# Patient Record
Sex: FEMALE | Race: WHITE | Employment: FULL TIME | URBAN - NONMETROPOLITAN AREA
[De-identification: names, ages, dates, MRNs, and addresses within clinical notes are randomized per-mention and may not be internally consistent; named-entity substitution may affect disease eponyms.]

---

## 2017-01-20 DIAGNOSIS — G47.00 INSOMNIA, UNSPECIFIED TYPE: ICD-10-CM

## 2017-01-24 RX ORDER — CHOLECALCIFEROL (VITAMIN D3) 125 MCG
CAPSULE ORAL
Qty: 30 TABLET | Refills: 0 | Status: SHIPPED | OUTPATIENT
Start: 2017-01-24 | End: 2017-03-01 | Stop reason: SDUPTHER

## 2017-01-26 RX ORDER — OMEPRAZOLE 20 MG/1
CAPSULE, DELAYED RELEASE ORAL
Qty: 60 CAPSULE | Refills: 0 | Status: SHIPPED | OUTPATIENT
Start: 2017-01-26 | End: 2017-02-22 | Stop reason: CLARIF

## 2017-01-26 RX ORDER — ASPIRIN 325 MG/1
TABLET, COATED ORAL
Qty: 30 TABLET | Refills: 0 | Status: SHIPPED | OUTPATIENT
Start: 2017-01-26

## 2017-02-14 DIAGNOSIS — R53.83 OTHER FATIGUE: ICD-10-CM

## 2017-02-17 RX ORDER — UBIDECARENONE 75 MG
CAPSULE ORAL
Qty: 14 TABLET | Refills: 0 | Status: SHIPPED | OUTPATIENT
Start: 2017-02-17 | End: 2017-02-22 | Stop reason: SDUPTHER

## 2017-02-17 RX ORDER — ATENOLOL 25 MG/1
TABLET ORAL
Qty: 14 TABLET | Refills: 0 | Status: SHIPPED | OUTPATIENT
Start: 2017-02-17 | End: 2017-02-22 | Stop reason: SDUPTHER

## 2017-03-01 DIAGNOSIS — G47.00 INSOMNIA, UNSPECIFIED TYPE: ICD-10-CM

## 2017-03-02 RX ORDER — CHOLECALCIFEROL (VITAMIN D3) 125 MCG
CAPSULE ORAL
Qty: 30 TABLET | Refills: 0 | Status: SHIPPED | OUTPATIENT
Start: 2017-03-02

## 2017-03-07 DIAGNOSIS — F41.9 ANXIETY: ICD-10-CM

## 2017-03-07 RX ORDER — HYDROXYZINE PAMOATE 25 MG/1
50 CAPSULE ORAL 4 TIMES DAILY PRN
Qty: 120 CAPSULE | Refills: 0 | Status: SHIPPED | OUTPATIENT
Start: 2017-03-07 | End: 2017-03-22 | Stop reason: SDUPTHER

## 2017-03-16 RX ORDER — ALBUTEROL SULFATE 90 UG/1
2 AEROSOL, METERED RESPIRATORY (INHALATION) EVERY 6 HOURS PRN
Qty: 1 INHALER | Refills: 0 | Status: SHIPPED | OUTPATIENT
Start: 2017-03-16

## 2017-03-22 DIAGNOSIS — F41.9 ANXIETY: ICD-10-CM

## 2017-03-22 RX ORDER — HYDROXYZINE PAMOATE 50 MG/1
50 CAPSULE ORAL 3 TIMES DAILY PRN
Qty: 90 CAPSULE | Refills: 1 | Status: SHIPPED | OUTPATIENT
Start: 2017-03-22

## 2017-05-22 DIAGNOSIS — F41.9 ANXIETY: ICD-10-CM

## 2017-05-23 RX ORDER — HYDROXYZINE PAMOATE 50 MG/1
50 CAPSULE ORAL 3 TIMES DAILY PRN
Qty: 90 CAPSULE | Refills: 1 | OUTPATIENT
Start: 2017-05-23

## 2018-01-18 ENCOUNTER — HOSPITAL ENCOUNTER (OUTPATIENT)
Dept: GENERAL RADIOLOGY | Age: 58
Discharge: HOME | End: 2018-01-18

## 2018-01-18 DIAGNOSIS — R01.2 ABNORMAL HEART SOUNDS: ICD-10-CM

## 2018-03-02 ENCOUNTER — HOSPITAL ENCOUNTER (OUTPATIENT)
Dept: CT IMAGING | Age: 58
Discharge: HOME | End: 2018-03-02

## 2018-03-02 DIAGNOSIS — R91.1 LUNG NODULE: ICD-10-CM

## 2018-03-20 ENCOUNTER — HOSPITAL ENCOUNTER (OUTPATIENT)
Dept: CARDIAC REHAB | Age: 58
Discharge: HOME | End: 2018-03-20

## 2018-03-20 ENCOUNTER — HOSPITAL ENCOUNTER (OUTPATIENT)
Dept: CARDIAC REHAB | Age: 58
Discharge: HOME | End: 2018-03-20
Attending: INTERNAL MEDICINE | Admitting: INTERNAL MEDICINE

## 2018-03-20 LAB
LV EF: 54 %
LVEF MODALITY: NORMAL

## 2018-10-30 ENCOUNTER — HOSPITAL ENCOUNTER (OUTPATIENT)
Age: 58
Discharge: HOME | End: 2018-10-30
Payer: COMMERCIAL

## 2018-10-30 LAB
ALBUMIN SERPL-MCNC: 4.8 GM/DL (ref 3.4–5)
ALP BLD-CCNC: 106 IU/L (ref 40–129)
ALT SERPL-CCNC: 10 U/L (ref 10–40)
ANION GAP SERPL CALCULATED.3IONS-SCNC: 12 MMOL/L (ref 4–16)
AST SERPL-CCNC: 17 IU/L (ref 15–37)
BILIRUB SERPL-MCNC: 0.4 MG/DL (ref 0–1)
BUN BLDV-MCNC: 16 MG/DL (ref 6–23)
CALCIUM SERPL-MCNC: 10.2 MG/DL (ref 8.3–10.6)
CHLORIDE BLD-SCNC: 100 MMOL/L (ref 99–110)
CO2: 29 MMOL/L (ref 21–32)
CREAT SERPL-MCNC: 1 MG/DL (ref 0.6–1.1)
GFR AFRICAN AMERICAN: >60 ML/MIN/1.73M2
GFR NON-AFRICAN AMERICAN: 57 ML/MIN/1.73M2
GLUCOSE FASTING: 103 MG/DL (ref 70–99)
HCT VFR BLD CALC: 48.2 % (ref 37–47)
HEMOGLOBIN: 15.9 GM/DL (ref 12.5–16)
MCH RBC QN AUTO: 31.8 PG (ref 27–31)
MCHC RBC AUTO-ENTMCNC: 33 % (ref 32–36)
MCV RBC AUTO: 96.4 FL (ref 78–100)
PDW BLD-RTO: 12.5 % (ref 11.7–14.9)
PLATELET # BLD: 316 K/CU MM (ref 140–440)
PMV BLD AUTO: 10 FL (ref 7.5–11.1)
POTASSIUM SERPL-SCNC: 4.3 MMOL/L (ref 3.5–5.1)
RBC # BLD: 5 M/CU MM (ref 4.2–5.4)
SODIUM BLD-SCNC: 141 MMOL/L (ref 135–145)
TOTAL PROTEIN: 7.6 GM/DL (ref 6.4–8.2)
WBC # BLD: 9 K/CU MM (ref 4–10.5)

## 2018-10-30 PROCEDURE — 80061 LIPID PANEL: CPT

## 2018-10-30 PROCEDURE — 85027 COMPLETE CBC AUTOMATED: CPT

## 2018-10-30 PROCEDURE — 80053 COMPREHEN METABOLIC PANEL: CPT

## 2018-10-30 PROCEDURE — 84443 ASSAY THYROID STIM HORMONE: CPT

## 2018-10-30 PROCEDURE — 36415 COLL VENOUS BLD VENIPUNCTURE: CPT

## 2018-10-31 LAB
CHOLESTEROL, FASTING: 263 MG/DL
HDLC SERPL-MCNC: 76 MG/DL
LDL CHOLESTEROL DIRECT: 182 MG/DL
TRIGLYCERIDE, FASTING: 145 MG/DL
TSH HIGH SENSITIVITY: 2.58 UIU/ML (ref 0.27–4.2)

## 2020-04-17 ENCOUNTER — HOSPITAL ENCOUNTER (OUTPATIENT)
Age: 60
Discharge: HOME | End: 2020-04-17
Payer: MEDICARE

## 2020-04-17 ENCOUNTER — HOSPITAL ENCOUNTER (OUTPATIENT)
Dept: GENERAL RADIOLOGY | Age: 60
Discharge: HOME | End: 2020-04-17
Payer: MEDICARE

## 2020-04-17 LAB
ALBUMIN SERPL-MCNC: 4.3 GM/DL (ref 3.4–5)
ALP BLD-CCNC: 109 IU/L (ref 40–129)
ALT SERPL-CCNC: 13 U/L (ref 10–40)
ANION GAP SERPL CALCULATED.3IONS-SCNC: 7 MMOL/L (ref 4–16)
AST SERPL-CCNC: 20 IU/L (ref 15–37)
BILIRUB SERPL-MCNC: 0.3 MG/DL (ref 0–1)
BUN BLDV-MCNC: 8 MG/DL (ref 6–23)
CALCIUM SERPL-MCNC: 9.5 MG/DL (ref 8.3–10.6)
CHLORIDE BLD-SCNC: 102 MMOL/L (ref 99–110)
CO2: 34 MMOL/L (ref 21–32)
CREAT SERPL-MCNC: 0.9 MG/DL (ref 0.6–1.1)
GFR AFRICAN AMERICAN: >60 ML/MIN/1.73M2
GFR NON-AFRICAN AMERICAN: >60 ML/MIN/1.73M2
GLUCOSE FASTING: 96 MG/DL (ref 70–99)
HCT VFR BLD CALC: 45.4 % (ref 37–47)
HEMOGLOBIN: 14.6 GM/DL (ref 12.5–16)
MCH RBC QN AUTO: 31.3 PG (ref 27–31)
MCHC RBC AUTO-ENTMCNC: 32.2 % (ref 32–36)
MCV RBC AUTO: 97.2 FL (ref 78–100)
PDW BLD-RTO: 12.1 % (ref 11.7–14.9)
PLATELET # BLD: 235 K/CU MM (ref 140–440)
PMV BLD AUTO: 11.6 FL (ref 7.5–11.1)
POTASSIUM SERPL-SCNC: 4.5 MMOL/L (ref 3.5–5.1)
RBC # BLD: 4.67 M/CU MM (ref 4.2–5.4)
SODIUM BLD-SCNC: 143 MMOL/L (ref 135–145)
TOTAL PROTEIN: 7 GM/DL (ref 6.4–8.2)
TROPONIN T: <0.01 NG/ML
TSH HIGH SENSITIVITY: 2.59 UIU/ML (ref 0.27–4.2)
WBC # BLD: 7.4 K/CU MM (ref 4–10.5)

## 2020-04-17 PROCEDURE — 80053 COMPREHEN METABOLIC PANEL: CPT

## 2020-04-17 PROCEDURE — 85027 COMPLETE CBC AUTOMATED: CPT

## 2020-04-17 PROCEDURE — 36415 COLL VENOUS BLD VENIPUNCTURE: CPT

## 2020-04-17 PROCEDURE — 84484 ASSAY OF TROPONIN QUANT: CPT

## 2020-04-17 PROCEDURE — 84443 ASSAY THYROID STIM HORMONE: CPT

## 2020-04-17 PROCEDURE — 71046 X-RAY EXAM CHEST 2 VIEWS: CPT

## 2023-03-21 LAB
ALBUMIN SERPL-MCNC: 5.4 G/DL
ALP BLD-CCNC: 130 U/L
ALT SERPL-CCNC: 34 U/L
ANION GAP SERPL CALCULATED.3IONS-SCNC: NORMAL MMOL/L
AST SERPL-CCNC: 22 U/L
BASOPHILS ABSOLUTE: 0.06 /ΜL
BASOPHILS RELATIVE PERCENT: 0.8 %
BILIRUB SERPL-MCNC: 0.7 MG/DL (ref 0.1–1.4)
BUN BLDV-MCNC: 12 MG/DL
CALCIUM SERPL-MCNC: 10.1 MG/DL
CHLORIDE BLD-SCNC: 102 MMOL/L
CHOLESTEROL, TOTAL: 165 MG/DL
CHOLESTEROL/HDL RATIO: ABNORMAL
CO2: 28 MMOL/L
CREAT SERPL-MCNC: NORMAL MG/DL
EGFR: NORMAL
EOSINOPHILS ABSOLUTE: 0.19 /ΜL
EOSINOPHILS RELATIVE PERCENT: 2.5 %
GLUCOSE BLD-MCNC: 82 MG/DL
HCT VFR BLD CALC: 49.7 % (ref 36–46)
HDLC SERPL-MCNC: 89 MG/DL (ref 35–70)
HEMOGLOBIN: 17.6 G/DL (ref 12–16)
LDL CHOLESTEROL CALCULATED: 58 MG/DL (ref 0–160)
LYMPHOCYTES ABSOLUTE: 1.49 /ΜL
LYMPHOCYTES RELATIVE PERCENT: 19.6 %
MCH RBC QN AUTO: 32.7 PG
MCHC RBC AUTO-ENTMCNC: 34.2 G/DL
MCV RBC AUTO: 95.6 FL
MONOCYTES ABSOLUTE: 0.37 /ΜL
MONOCYTES RELATIVE PERCENT: 4.9 %
NEUTROPHILS ABSOLUTE: 5.47 /ΜL
NEUTROPHILS RELATIVE PERCENT: 71.9 %
NONHDLC SERPL-MCNC: ABNORMAL MG/DL
PLATELET # BLD: 247 K/ΜL
PMV BLD AUTO: 11.4 FL
POTASSIUM SERPL-SCNC: 4.2 MMOL/L
RBC # BLD: 5.2 10^6/ΜL
SODIUM BLD-SCNC: 142 MMOL/L
TOTAL PROTEIN: 7.5
TRIGL SERPL-MCNC: 89 MG/DL
VLDLC SERPL CALC-MCNC: 18 MG/DL
WBC # BLD: 7.6 10^3/ML

## 2023-04-26 ENCOUNTER — INITIAL CONSULT (OUTPATIENT)
Dept: CARDIOLOGY CLINIC | Age: 63
End: 2023-04-26

## 2023-04-26 ENCOUNTER — NURSE ONLY (OUTPATIENT)
Dept: CARDIOLOGY CLINIC | Age: 63
End: 2023-04-26

## 2023-04-26 VITALS
BODY MASS INDEX: 16.12 KG/M2 | DIASTOLIC BLOOD PRESSURE: 58 MMHG | HEART RATE: 53 BPM | HEIGHT: 58 IN | SYSTOLIC BLOOD PRESSURE: 102 MMHG | WEIGHT: 76.8 LBS

## 2023-04-26 DIAGNOSIS — R07.9 CHEST PAIN, UNSPECIFIED TYPE: Primary | ICD-10-CM

## 2023-04-26 PROCEDURE — 99244 OFF/OP CNSLTJ NEW/EST MOD 40: CPT | Performed by: INTERNAL MEDICINE

## 2023-04-26 PROCEDURE — 3078F DIAST BP <80 MM HG: CPT | Performed by: INTERNAL MEDICINE

## 2023-04-26 PROCEDURE — 3074F SYST BP LT 130 MM HG: CPT | Performed by: INTERNAL MEDICINE

## 2023-04-26 PROCEDURE — 93000 ELECTROCARDIOGRAM COMPLETE: CPT | Performed by: INTERNAL MEDICINE

## 2023-04-26 RX ORDER — ROSUVASTATIN CALCIUM 20 MG/1
20 TABLET, COATED ORAL DAILY
COMMUNITY

## 2023-04-26 RX ORDER — DEXLANSOPRAZOLE 30 MG/1
30 CAPSULE, DELAYED RELEASE ORAL DAILY
COMMUNITY

## 2023-04-26 RX ORDER — ASPIRIN 81 MG/1
81 TABLET ORAL DAILY
COMMUNITY

## 2023-04-26 RX ORDER — AMLODIPINE BESYLATE 5 MG/1
5 TABLET ORAL DAILY
COMMUNITY

## 2023-04-26 RX ORDER — CLOPIDOGREL BISULFATE 75 MG/1
75 TABLET ORAL DAILY
COMMUNITY

## 2023-04-26 RX ORDER — OMEPRAZOLE 40 MG/1
40 CAPSULE, DELAYED RELEASE ORAL DAILY
COMMUNITY

## 2023-04-26 RX ORDER — VALSARTAN 320 MG/1
320 TABLET ORAL DAILY
COMMUNITY

## 2023-04-26 NOTE — PROGRESS NOTES
[Guaifenesin] Rash    Theraflu Cold & Cough [Phenylephrine-Pheniramine-Dm] Anxiety       No current facility-administered medications for this visit.     Current Outpatient Medications   Medication Sig Dispense Refill    amLODIPine (NORVASC) 5 MG tablet Take 1 tablet by mouth daily      metoprolol tartrate (LOPRESSOR) 25 MG tablet Take 1 tablet by mouth 2 times daily      valsartan (DIOVAN) 320 MG tablet Take 1 tablet by mouth daily      omeprazole (PRILOSEC) 40 MG delayed release capsule Take 1 capsule by mouth daily      aspirin 81 MG EC tablet Take 1 tablet by mouth daily      dexlansoprazole (DEXILANT) 30 MG CPDR delayed release capsule Take 30 mg by mouth daily      rosuvastatin (CRESTOR) 20 MG tablet Take 1 tablet by mouth daily      clopidogrel (PLAVIX) 75 MG tablet Take 1 tablet by mouth daily      hydrOXYzine (VISTARIL) 50 MG capsule Take 1 capsule by mouth 3 times daily as needed for Anxiety 90 capsule 1    albuterol sulfate HFA (VENTOLIN HFA) 108 (90 BASE) MCG/ACT inhaler Inhale 2 puffs into the lungs every 6 hours as needed for Wheezing 1 Inhaler 0    gabapentin (NEURONTIN) 600 MG tablet Take 1 tablet by mouth daily 90 tablet 0    EQ ASPIRIN 325 MG tablet TAKE ONE TABLET BY MOUTH ONCE DAILY 30 tablet 0    fluticasone (FLONASE) 50 MCG/ACT nasal spray 2 sprays by Nasal route daily 1 Bottle 3    acetaminophen (TYLENOL) 500 MG tablet Take 2 tablets by mouth every 8 hours as needed for Pain 180 tablet 3    albuterol (PROVENTIL) (2.5 MG/3ML) 0.083% nebulizer solution Take 3 mLs by nebulization every 6 hours as needed for Wheezing 120 each 3    Blood Pressure Monitoring KIT 1 each by Does not apply route daily 1 kit 0    Respiratory Therapy Supplies (NEBULIZER COMPRESSOR) KIT 1 kit by Does not apply route once for 1 dose 1 kit 0    mometasone-formoterol (DULERA) 100-5 MCG/ACT inhaler INHALE 2 PUFFS TWICE DAILY (Patient not taking: Reported on 4/26/2023) 1 Inhaler 0    melatonin 5 MG TABS tablet TAKE ONE TABLET

## 2023-05-04 ENCOUNTER — TELEPHONE (OUTPATIENT)
Dept: CARDIOLOGY CLINIC | Age: 63
End: 2023-05-04

## 2023-05-04 NOTE — TELEPHONE ENCOUNTER
Holter Monitor Results:    Normal sinus rhythm with frequent complex supraventricular ectopy and isolated nonsustained 7 beat run of atrial tachycardia. Patient did not submit the diary for activity or symptom correlation. LM on VM for pt to please call office for message.

## 2023-05-16 ENCOUNTER — PROCEDURE VISIT (OUTPATIENT)
Dept: CARDIOLOGY CLINIC | Age: 63
End: 2023-05-16

## 2023-05-16 DIAGNOSIS — R07.9 CHEST PAIN, UNSPECIFIED TYPE: Primary | ICD-10-CM

## 2023-05-16 DIAGNOSIS — I63.9 CEREBROVASCULAR ACCIDENT (CVA), UNSPECIFIED MECHANISM (HCC): Primary | ICD-10-CM

## 2023-05-16 DIAGNOSIS — R07.9 CHEST PAIN, UNSPECIFIED TYPE: ICD-10-CM

## 2023-05-16 LAB
LV EF: 58 %
LV EF: 71 %
LVEF MODALITY: NORMAL
LVEF MODALITY: NORMAL

## 2023-05-16 PROCEDURE — A9500 TC99M SESTAMIBI: HCPCS | Performed by: INTERNAL MEDICINE

## 2023-05-16 PROCEDURE — 78452 HT MUSCLE IMAGE SPECT MULT: CPT | Performed by: INTERNAL MEDICINE

## 2023-05-16 PROCEDURE — 93306 TTE W/DOPPLER COMPLETE: CPT | Performed by: INTERNAL MEDICINE

## 2023-05-16 PROCEDURE — 93018 CV STRESS TEST I&R ONLY: CPT | Performed by: INTERNAL MEDICINE

## 2023-05-16 PROCEDURE — 93017 CV STRESS TEST TRACING ONLY: CPT | Performed by: INTERNAL MEDICINE

## 2023-05-16 PROCEDURE — 93880 EXTRACRANIAL BILAT STUDY: CPT | Performed by: INTERNAL MEDICINE

## 2023-05-18 ENCOUNTER — TELEPHONE (OUTPATIENT)
Dept: CARDIOLOGY CLINIC | Age: 63
End: 2023-05-18

## 2023-05-18 NOTE — TELEPHONE ENCOUNTER
Called to patient the results of her recent testing. Carotid Moderate (50-69%) disease of the Right proximal Internal carotid artery. Mild (0-49%) disease of the Left mid Internal carotid artery. NM Normal isotope uptake following exercise and at rest. There is no evidence of exercise induced ischemia. Echo EF 55-60%. Doppler evaluation reveals mild aortic, mild to moderate mitral, and mild tricuspid regurgitation.  Right ventricular systolic pressure of 45 mmHg consistent with mild to moderate pulmonary hypertension

## 2023-05-31 ENCOUNTER — OFFICE VISIT (OUTPATIENT)
Dept: CARDIOLOGY CLINIC | Age: 63
End: 2023-05-31

## 2023-05-31 VITALS
SYSTOLIC BLOOD PRESSURE: 118 MMHG | BODY MASS INDEX: 15.32 KG/M2 | HEART RATE: 80 BPM | DIASTOLIC BLOOD PRESSURE: 66 MMHG | HEIGHT: 58 IN | WEIGHT: 73 LBS

## 2023-05-31 DIAGNOSIS — R00.2 PALPITATION: Primary | ICD-10-CM

## 2023-05-31 PROCEDURE — 3078F DIAST BP <80 MM HG: CPT | Performed by: INTERNAL MEDICINE

## 2023-05-31 PROCEDURE — 99214 OFFICE O/P EST MOD 30 MIN: CPT | Performed by: INTERNAL MEDICINE

## 2023-05-31 PROCEDURE — 3074F SYST BP LT 130 MM HG: CPT | Performed by: INTERNAL MEDICINE

## 2023-05-31 NOTE — PROGRESS NOTES
Juaquin Roberts MD        OFFICE  FOLLOWUP NOTE    Chief complaints: patient is here for management of  CAD, abnormal EKG, chest pain, palpitations, SOB    Subjective: + palpitations    HPI Marissa Remy is a 60 y. o.year old who  has a past medical history of Salas's esophagus, Emphysema of lung ([facility] [address] H/O Doppler ultrasound carotid, H/O echocardiogram, H/O exercise stress test, History of Holter monitoring, History of nuclear stress test, HLD (hyperlipidemia), Hypertension, and [facility]  and presents for management of  CAD, abnormal EKG, chest pain, palpitations, SOB which are well controlled      Current Outpatient Medications   Medication Sig Dispense Refill    amLODIPine (NORVASC) 5 MG tablet Take 1 tablet by mouth daily      metoprolol tartrate (LOPRESSOR) 25 MG tablet Take 1 tablet by mouth 2 times daily      valsartan (DIOVAN) 320 MG tablet Take 1 tablet by mouth daily      omeprazole (PRILOSEC) 40 MG delayed release capsule Take 1 capsule by mouth daily      aspirin 81 MG EC tablet Take 1 tablet by mouth daily      dexlansoprazole (DEXILANT) 30 MG CPDR delayed release capsule Take 30 mg by mouth daily      rosuvastatin (CRESTOR) 20 MG tablet Take 1 tablet by mouth daily      clopidogrel (PLAVIX) 75 MG tablet Take 1 tablet by mouth daily      hydrOXYzine (VISTARIL) 50 MG capsule Take 1 capsule by mouth 3 times daily as needed for Anxiety 90 capsule 1    albuterol sulfate HFA (VENTOLIN HFA) 108 (90 BASE) MCG/ACT inhaler Inhale 2 puffs into the lungs every 6 hours as needed for Wheezing 1 Inhaler 0    gabapentin (NEURONTIN) 600 MG tablet Take 1 tablet by mouth daily 90 tablet 0    EQ ASPIRIN 325 MG tablet TAKE ONE TABLET BY MOUTH ONCE DAILY 30 tablet 0    fluticasone (FLONASE) 50 MCG/ACT nasal spray 2 sprays by Nasal route daily 1 Bottle 3    acetaminophen (TYLENOL) 500 MG tablet Take 2 tablets by mouth every 8 hours as needed for Pain 180 tablet 3    albuterol (PROVENTIL) (2.5

## 2023-06-05 NOTE — PROGRESS NOTES
6/5/23 - . LM with my call-back # concerning  surgery @ [facility] on  6/8/23. Please call the PAT Nurse for a phone assessment and surgery instructions.

## 2023-06-06 NOTE — PROGRESS NOTES
Patient will be called with an arrival time on 6/7/2023 for her procedure at [facility] on 6/8/2023. NOTHING TO EAT OR DRINK AFTER MIDNIGHT DAY OF SURGERY    1. Enter thru the hospital main entrance on day of surgery, check in at the Information Desk. If you arrive prior to 6:00am, enter thru the ER entrance. 2. Follow the directions as prescribed by the doctor for your procedure and medications. Morning of surgery take:she will bring her inhaler with her and take her amlodipine, metoprolol and prilosec. Stop vitamins, supplements and NSAIDS:last dose of plavix was on 6/2/2023. 3. Check with your Doctor regarding stopping blood thinners and follow their instructions. 4. Do not smoke, vape or use chewing tobacco morning of surgery. Do not drink any alcoholic beverages 24 hours prior to surgery. This includes NA Beer. No street drugs 7 days prior to surgery. 5. If you have dentures, contacts of glasses they will be removed before going to the OR; please bring a case. 6. Please bring picture ID, insurance card, paperwork from the doctors office (H & P, Consent, & card for implantable devices).

## 2023-06-07 ENCOUNTER — ANESTHESIA EVENT (OUTPATIENT)
Dept: ENDOSCOPY | Age: 63
End: 2023-06-07

## 2023-06-07 ASSESSMENT — LIFESTYLE VARIABLES: SMOKING_STATUS: 1

## 2023-06-07 NOTE — PROGRESS NOTES
6/7/23 -  for patient, procedure at [facility] 6/8/23 @ 0930, arrival 0800. Please call with any questions. Statement Selected

## 2023-06-08 ENCOUNTER — HOSPITAL ENCOUNTER (OUTPATIENT)
Age: 63
Setting detail: OUTPATIENT SURGERY
Discharge: HOME | End: 2023-06-08
Attending: INTERNAL MEDICINE | Admitting: INTERNAL MEDICINE

## 2023-06-08 ENCOUNTER — ANESTHESIA (OUTPATIENT)
Dept: ENDOSCOPY | Age: 63
End: 2023-06-08

## 2023-06-08 VITALS
SYSTOLIC BLOOD PRESSURE: 106 MMHG | TEMPERATURE: 96.7 F | BODY MASS INDEX: 14.91 KG/M2 | HEIGHT: 58 IN | WEIGHT: 71 LBS | HEART RATE: 67 BPM | DIASTOLIC BLOOD PRESSURE: 46 MMHG | RESPIRATION RATE: 16 BRPM | OXYGEN SATURATION: 95 %

## 2023-06-08 DIAGNOSIS — J44.9 CHRONIC OBSTRUCTIVE PULMONARY DISEASE, UNSPECIFIED COPD TYPE (HCC): ICD-10-CM

## 2023-06-08 DIAGNOSIS — R19.8 CHANGE IN BOWEL FUNCTION: ICD-10-CM

## 2023-06-08 DIAGNOSIS — K22.70 BARRETT'S ESOPHAGUS WITHOUT DYSPLASIA: ICD-10-CM

## 2023-06-08 PROCEDURE — 2580000003 HC RX 258: Performed by: ANESTHESIOLOGY

## 2023-06-08 PROCEDURE — 3609012400 HC EGD TRANSORAL BIOPSY SINGLE/MULTIPLE: Performed by: INTERNAL MEDICINE

## 2023-06-08 PROCEDURE — 3609010600 HC COLONOSCOPY POLYPECTOMY SNARE/COLD BIOPSY: Performed by: INTERNAL MEDICINE

## 2023-06-08 PROCEDURE — 88305 TISSUE EXAM BY PATHOLOGIST: CPT | Performed by: PATHOLOGY

## 2023-06-08 PROCEDURE — 3700000001 HC ADD 15 MINUTES (ANESTHESIA): Performed by: INTERNAL MEDICINE

## 2023-06-08 PROCEDURE — 2500000003 HC RX 250 WO HCPCS: Performed by: NURSE ANESTHETIST, CERTIFIED REGISTERED

## 2023-06-08 PROCEDURE — 2709999900 HC NON-CHARGEABLE SUPPLY: Performed by: INTERNAL MEDICINE

## 2023-06-08 PROCEDURE — 88342 IMHCHEM/IMCYTCHM 1ST ANTB: CPT | Performed by: PATHOLOGY

## 2023-06-08 PROCEDURE — 3700000000 HC ANESTHESIA ATTENDED CARE: Performed by: INTERNAL MEDICINE

## 2023-06-08 PROCEDURE — 6360000002 HC RX W HCPCS: Performed by: NURSE ANESTHETIST, CERTIFIED REGISTERED

## 2023-06-08 PROCEDURE — 7100000011 HC PHASE II RECOVERY - ADDTL 15 MIN: Performed by: INTERNAL MEDICINE

## 2023-06-08 PROCEDURE — 7100000010 HC PHASE II RECOVERY - FIRST 15 MIN: Performed by: INTERNAL MEDICINE

## 2023-06-08 RX ORDER — PROPOFOL 10 MG/ML
INJECTION, EMULSION INTRAVENOUS PRN
Status: DISCONTINUED | OUTPATIENT
Start: 2023-06-08 | End: 2023-06-08 | Stop reason: SDUPTHER

## 2023-06-08 RX ORDER — LIDOCAINE HYDROCHLORIDE 20 MG/ML
INJECTION, SOLUTION EPIDURAL; INFILTRATION; INTRACAUDAL; PERINEURAL PRN
Status: DISCONTINUED | OUTPATIENT
Start: 2023-06-08 | End: 2023-06-08 | Stop reason: SDUPTHER

## 2023-06-08 RX ORDER — SODIUM CHLORIDE, SODIUM LACTATE, POTASSIUM CHLORIDE, CALCIUM CHLORIDE 600; 310; 30; 20 MG/100ML; MG/100ML; MG/100ML; MG/100ML
INJECTION, SOLUTION INTRAVENOUS CONTINUOUS
Status: DISCONTINUED | OUTPATIENT
Start: 2023-06-08 | End: 2023-06-08 | Stop reason: HOSPADM

## 2023-06-08 RX ADMIN — LIDOCAINE HYDROCHLORIDE 100 MG: 20 INJECTION, SOLUTION EPIDURAL; INFILTRATION; INTRACAUDAL; PERINEURAL at 09:59

## 2023-06-08 RX ADMIN — PROPOFOL 240 MG: 10 INJECTION, EMULSION INTRAVENOUS at 10:19

## 2023-06-08 RX ADMIN — SODIUM CHLORIDE, POTASSIUM CHLORIDE, SODIUM LACTATE AND CALCIUM CHLORIDE: 600; 310; 30; 20 INJECTION, SOLUTION INTRAVENOUS at 08:58

## 2023-06-08 ASSESSMENT — PAIN SCALES - GENERAL: PAINLEVEL_OUTOF10: 0

## 2023-06-08 ASSESSMENT — LIFESTYLE VARIABLES: SMOKING_STATUS: 1

## 2023-06-08 ASSESSMENT — PAIN - FUNCTIONAL ASSESSMENT: PAIN_FUNCTIONAL_ASSESSMENT: 0-10

## 2023-06-08 NOTE — DISCHARGE INSTRUCTIONS
do not have an account, please click on the Sign Up Now link. © 6857-8785 [employer], [employer]. Care instructions adapted under license by [facility] ([facility]). This care instruction is for use with your licensed healthcare professional. If you have questions about a medical condition or this instruction, always ask your healthcare professional. [address] any warranty or liability for your use of this information.   Content Version: 38.9.005613; Current as of: November 20, 2015

## 2023-06-08 NOTE — H&P
[address]   Pre-operative History and Physical    Patient: Saul Murphy  : 1960      History Obtained From: Patient, Nursing chart and Guardian/family members        HISTORY OF PRESENT ILLNESS:                The patient is a 60 y.o. female with significant past medical history as below who presents for endoscopy and C scope    Indication endoscopy for GERD, colonoscopy for change in bowel habits and family colon cancer    Past Medical History:        Diagnosis Date    Salas's esophagus     Emphysema of lung ([address]     H/O Doppler ultrasound carotid 2023    Carotid Moderate (50-69%) disease of the Right proximal Internal carotid artery. Mild (0-49%) disease of the Left mid Internal carotid artery. H/O echocardiogram 2023    Echo EF 55-60%. Doppler evaluation reveals mild aortic, mild to moderate mitral, and mild tricuspid regurgitation. Right ventricular systolic pressure of 45 mmHg consistent with mild to moderate pulmonary hypertension    H/O exercise stress test 2016    Good exercise capacity. EF 70% Normal test.    History of Holter monitoring 2023    Normal sinus rhythm with frequent complex supraventricular ectopy and isolated nonsustained 7 beat run of atrial tachycardia. History of nuclear stress test 2023    NM Normal isotope uptake following exercise and at rest. There is no evidence of exercise induced ischemia. HLD (hyperlipidemia)     Hypertension     Pulmonary emphysema (HCC)     TIA (transient ischemic attack)        Past Surgical History:        Procedure Laterality Date    APPENDECTOMY      COLONOSCOPY           Current Medications:    Medications    Prior to Admission medications    Medication Sig Start Date End Date Taking?  Authorizing Provider   amLODIPine (NORVASC) 5 MG tablet Take 1 tablet by mouth daily    Historical Provider, MD   metoprolol tartrate (LOPRESSOR) 25 MG tablet Take 1 tablet by mouth 2

## 2023-06-08 NOTE — ANESTHESIA POSTPROCEDURE EVALUATION
Department of Anesthesiology  Postprocedure Note    Patient: Danika Alfred  MRN: [id number]  [place]: 1960  Date of evaluation: 6/8/2023      Procedure Summary     Date: 06/08/23 Room / Location: [address]    Anesthesia Start: 0975 Anesthesia Stop: 1020    Procedures:       COLONOSCOPY POLYPECTOMY SNARE/COLD BIOPSY with polypectomy at hepatic flexure polyp, ascending colon, sigmoid colon and rectal colon polyps      EGD BIOPSY for  Celiac Disease and H-Pyloric Diagnosis:       Chronic obstructive pulmonary disease, unspecified COPD type ([facility] [address]      Salas's esophagus without dysplasia      Change in bowel function      (COPD , GERD , CHANGE IN BOWEL FUNCTION , BARRETTS ESOPHAGUS)    Surgeons: Yunior Antonio MD Responsible Provider: Sofie Henderson MD    Anesthesia Type: MAC ASA Status: 3          Anesthesia Type: No value filed.     Vick Phase I: Vick Score: 10    Vick Phase II:        Anesthesia Post Evaluation    Patient location during evaluation: bedside  Patient participation: complete - patient participated  Level of consciousness: awake and alert  Pain score: 0  Airway patency: patent  Nausea & Vomiting: no nausea and no vomiting  Complications: no  Cardiovascular status: blood pressure returned to baseline  Respiratory status: acceptable  Hydration status: euvolemic

## 2023-06-08 NOTE — PROGRESS NOTES
1037  Patient arrived back to [facility]. Report given to this nurse from [facility], RN. Patient A&O. No N/V. Beverage of choice offered to patient. Call light in reach and bed in lowest position. 1110 patient complaining of lightheadedness  1140 IV removed. 1145 Discharge instructions given to son, Cristopher metz. 1148 Patient sitting on side of bed getting dressed unassisted. 1158 Patient escorted to car via wheelchair transported home by Cristopher passalud.

## 2023-06-08 NOTE — PROGRESS NOTES
Received report from [facility], [address]. Patient alert and oriented. Verified patient's name, , allergies and procedure. Took metoprolol on 23 @ 1800, took Plavix on 23, no implants. H&P on chart. No family/friend at bedside. Smoked cigarette at 0600.

## 2023-06-08 NOTE — OP NOTE
Operative Note      Patient: Polly Wright  YOB: 1960  MRN: [id number]    Date of Procedure: 6/8/2023    [facility]      BRIEF OP REPORT:    Impression:    1) EGD showing mild antral gastritis otherwise normal mucosa in the stomach  Biopsy antrum body for H. pylori done  Esophagus normal  Duodenum normal  Biopsy of second part of duodenum for celiac done   2) colonoscopy showing grade 2 internal hemorrhoids mild to moderate sigmoid alkalosis  5 mm polyp in the rectum cold snared and retrieved  5 mm polyp in sigmoid colon cold snared and retrieved  Diminutive polyp at hepatic flexure cold biopsied off  8 mm polyp in ascending colon multilobulated cold snared piecemeal retrieved completely  Otherwise normal colonoscopy           Suggest:   1) high-fiber diet, probiotics every day, 64 ounces of water every day   2) follow-up in 2 to 4 weeks   3) recall colonoscopy in 3 years     Full EGD/COLONOSCOPY report available by going to \"chart review\" then \"procedures\" then  \"EGD/Colonoscopy\"  then \"View Endoscopy Report\"      Electronically signed by Jean Buckley MD on 6/8/2023 at 10:29 AM

## 2023-06-08 NOTE — PROGRESS NOTES
Patient is back to baseline. Alert and oriented. Side rails up x 2, call light in reach. Report given to Chandana Alvarez RN. No family/friend at bedside.

## 2023-06-08 NOTE — ANESTHESIA PRE PROCEDURE
Department of Anesthesiology  Preprocedure Note       Name:  Dorothy Suero   Age:  60 y.o.  :  1960                                          MRN:  [id number]         Date:  2023      Surgeon: Sandee Salinas):  Edgardo Light MD    Procedure: Procedure(s):  COLONOSCOPY WITH BIOPSY  EGD ESOPHAGOGASTRODUODENOSCOPY    Medications prior to admission:   Prior to Admission medications    Medication Sig Start Date End Date Taking?  Authorizing Provider   amLODIPine (NORVASC) 5 MG tablet Take 1 tablet by mouth daily    Historical Provider, MD   metoprolol tartrate (LOPRESSOR) 25 MG tablet Take 1 tablet by mouth 2 times daily    Historical Provider, MD   valsartan (DIOVAN) 320 MG tablet Take 1 tablet by mouth daily    Historical Provider, MD   omeprazole (PRILOSEC) 40 MG delayed release capsule Take 1 capsule by mouth daily    Historical Provider, MD   aspirin 81 MG EC tablet Take 1 tablet by mouth daily    Historical Provider, MD   dexlansoprazole (DEXILANT) 30 MG CPDR delayed release capsule Take 30 mg by mouth daily    Historical Provider, MD   rosuvastatin (CRESTOR) 20 MG tablet Take 1 tablet by mouth daily    Historical Provider, MD   clopidogrel (PLAVIX) 75 MG tablet Take 1 tablet by mouth daily    Historical Provider, MD   hydrOXYzine (VISTARIL) 50 MG capsule Take 1 capsule by mouth 3 times daily as needed for Anxiety 3/22/17   Scooter KJ Rocha - BLANKA   mometasone-formoterol [facility]) 100-5 MCG/ACT inhaler INHALE 2 PUFFS TWICE DAILY  Patient not taking: Reported on 2023 3/16/17   Scooter KJ Rocha - CNP   albuterol sulfate HFA (VENTOLIN HFA) 108 (90 BASE) MCG/ACT inhaler Inhale 2 puffs into the lungs every 6 hours as needed for Wheezing 3/16/17   Scooter KJ Rocha CNP   melatonin 5 MG TABS tablet TAKE ONE TABLET BY MOUTH ONCE DAILY AT  NIGHT  Patient not taking: Reported on 2023 3/2/17   KJ Perez CNP   ranitidine (ZANTAC) 150 MG tablet Take 1 tablet by mouth 2 times daily  Patient
Problem List:    Patient Active Problem List   Diagnosis Code    Hypertension I10    Barretts esophagus K22.70    Emphysema/COPD ([facility] J43.9    Tobacco use Z72.0    Chest pain R07.9       Past Medical History:        Diagnosis Date    Salas's esophagus     Emphysema of lung ([facility]     H/O Doppler ultrasound carotid 05/16/2023    Carotid Moderate (50-69%) disease of the Right proximal Internal carotid artery. Mild (0-49%) disease of the Left mid Internal carotid artery.  H/O echocardiogram 05/16/2023    Echo EF 55-60%. Doppler evaluation reveals mild aortic, mild to moderate mitral, and mild tricuspid regurgitation. Right ventricular systolic pressure of 45 mmHg consistent with mild to moderate pulmonary hypertension    H/O exercise stress test 01/05/2016    Good exercise capacity. EF 70% Normal test.    History of Holter monitoring 05/03/2023    Normal sinus rhythm with frequent complex supraventricular ectopy and isolated nonsustained 7 beat run of atrial tachycardia.  History of nuclear stress test 05/16/2023    NM Normal isotope uptake following exercise and at rest. There is no evidence of exercise induced ischemia.     HLD (hyperlipidemia)     Hypertension     Pulmonary emphysema (HCC)        Past Surgical History:        Procedure Laterality Date    APPENDECTOMY      COLONOSCOPY         Social History:    Social History     Tobacco Use    Smoking status: Every Day     Packs/day: 1.00     Types: Cigarettes    Smokeless tobacco: Never   Substance Use Topics    Alcohol use: Yes     Comment: maya                                Ready to quit: Not Answered  Counseling given: Not Answered      Vital Signs (Current):   Vitals:    06/06/23 1239 06/08/23 0832   BP:  (!) 162/72   Pulse:  65   Resp:  16   Temp:  (!) 96.7 °F (35.9 °C)   TempSrc:  Temporal   SpO2:  96%   Weight: 73 lb (33.1 kg) 71 lb (32.2 kg)   Height: 4' 10\" (1.473 m)                                               BP

## 2023-07-28 ENCOUNTER — HOSPITAL ENCOUNTER (OUTPATIENT)
Dept: ULTRASOUND IMAGING | Age: 63
Discharge: HOME | End: 2023-07-28

## 2023-07-28 DIAGNOSIS — R10.11 RUQ ABDOMINAL PAIN: ICD-10-CM

## 2023-07-28 PROCEDURE — 76705 ECHO EXAM OF ABDOMEN: CPT

## 2023-08-02 ENCOUNTER — OFFICE VISIT (OUTPATIENT)
Dept: CARDIOLOGY CLINIC | Age: 63
End: 2023-08-02

## 2023-08-02 VITALS
HEIGHT: 58 IN | HEART RATE: 60 BPM | SYSTOLIC BLOOD PRESSURE: 98 MMHG | WEIGHT: 73.4 LBS | BODY MASS INDEX: 15.41 KG/M2 | DIASTOLIC BLOOD PRESSURE: 60 MMHG

## 2023-08-02 DIAGNOSIS — R06.02 SOB (SHORTNESS OF BREATH): ICD-10-CM

## 2023-08-02 DIAGNOSIS — R00.0 TACHYCARDIA: ICD-10-CM

## 2023-08-02 DIAGNOSIS — I63.9 CEREBROVASCULAR ACCIDENT (CVA), UNSPECIFIED MECHANISM (HCC): ICD-10-CM

## 2023-08-02 DIAGNOSIS — R00.2 PALPITATION: ICD-10-CM

## 2023-08-02 DIAGNOSIS — R06.02 SHORTNESS OF BREATH: Primary | ICD-10-CM

## 2023-08-02 DIAGNOSIS — R07.9 CHEST PAIN, UNSPECIFIED TYPE: ICD-10-CM

## 2023-08-02 PROCEDURE — 3078F DIAST BP <80 MM HG: CPT | Performed by: INTERNAL MEDICINE

## 2023-08-02 PROCEDURE — 3074F SYST BP LT 130 MM HG: CPT | Performed by: INTERNAL MEDICINE

## 2023-08-02 PROCEDURE — 99214 OFFICE O/P EST MOD 30 MIN: CPT | Performed by: INTERNAL MEDICINE

## 2023-08-02 NOTE — PROGRESS NOTES
miki and echo shwoed mild to moderate MR and moderate pulmonary HTN, will defer LHC  Wheezing: recommend to stop smoking  Palpitations: 24 hrs holter monitor oshowed sinus rhyhtm with  7 beats run of ATRIA TACHYCARDIA and 5640 pad AND 1336 pvc, CONTINEU LOPRESSOR,refer to EP  H/o stroke'; left sided, resolved, continue aspirin, statins, plavix and blood pressure, check carotids  Dyslipidemia: continue statins, LDL is 58  HTN: low now, paco calzada valsartan,, continue lopressor, norvasc now  SALINAS'S esophagus\" stable  Health maintenance: exerise and diet  All labs, medications and tests reviewed, continue all other medications of all above medical condition listed as is.

## 2023-08-07 ENCOUNTER — TELEPHONE (OUTPATIENT)
Dept: CARDIOLOGY CLINIC | Age: 63
End: 2023-08-07

## 2023-08-07 NOTE — TELEPHONE ENCOUNTER
Patient called office thinking Dr. Pablo Robison had discontinued her valsartan and wanted to give her something else. She is unsure what the medication was so I read his note and he does not talk about any new medication just the discontinuation of valsartan. Her blood pressure when she ws in the office was 98/60. She had a stress test coming up on 8/17/23 asked her to take her blood pressure 2 times daily, keep a record of the blood pressures and bring them with her to her appointment and we will address at this time. Patient verbalized understanding of all information given.

## 2023-08-15 ENCOUNTER — TELEPHONE (OUTPATIENT)
Dept: CARDIOLOGY CLINIC | Age: 63
End: 2023-08-15

## 2023-08-15 NOTE — TELEPHONE ENCOUNTER
Left vm to return call to see if she can come in on 8/16/23 instead of 8/30/23 due to having an earlier opening.

## 2023-10-25 ENCOUNTER — OFFICE VISIT (OUTPATIENT)
Dept: PULMONOLOGY | Age: 63
End: 2023-10-25

## 2023-10-25 VITALS
SYSTOLIC BLOOD PRESSURE: 118 MMHG | HEIGHT: 58 IN | DIASTOLIC BLOOD PRESSURE: 72 MMHG | HEART RATE: 59 BPM | RESPIRATION RATE: 16 BRPM | WEIGHT: 71.13 LBS | OXYGEN SATURATION: 96 % | BODY MASS INDEX: 14.93 KG/M2

## 2023-10-25 DIAGNOSIS — J43.9 PULMONARY EMPHYSEMA, UNSPECIFIED EMPHYSEMA TYPE (HCC): Primary | ICD-10-CM

## 2023-10-25 DIAGNOSIS — Z72.0 TOBACCO USE: ICD-10-CM

## 2023-10-25 PROCEDURE — 99204 OFFICE O/P NEW MOD 45 MIN: CPT | Performed by: NURSE PRACTITIONER

## 2023-10-25 PROCEDURE — 3078F DIAST BP <80 MM HG: CPT | Performed by: NURSE PRACTITIONER

## 2023-10-25 PROCEDURE — 3074F SYST BP LT 130 MM HG: CPT | Performed by: NURSE PRACTITIONER

## 2023-10-25 ASSESSMENT — ENCOUNTER SYMPTOMS
SHORTNESS OF BREATH: 1
COUGH: 1

## 2023-10-25 ASSESSMENT — COPD QUESTIONNAIRES: COPD: 1

## 2024-01-23 ENCOUNTER — HOSPITAL ENCOUNTER (OUTPATIENT)
Age: 64
Setting detail: OBSERVATION
Discharge: ANOTHER HOSPITAL | End: 2024-01-23
Attending: EMERGENCY MEDICINE | Admitting: FAMILY MEDICINE
Payer: MEDICAID

## 2024-01-23 ENCOUNTER — HOSPITAL ENCOUNTER (INPATIENT)
Age: 64
LOS: 2 days | Discharge: HOME | End: 2024-01-25
Attending: INTERNAL MEDICINE
Payer: MEDICAID

## 2024-01-23 ENCOUNTER — APPOINTMENT (OUTPATIENT)
Dept: GENERAL RADIOLOGY | Age: 64
End: 2024-01-23
Payer: MEDICAID

## 2024-01-23 VITALS
DIASTOLIC BLOOD PRESSURE: 62 MMHG | TEMPERATURE: 97.7 F | WEIGHT: 68.9 LBS | OXYGEN SATURATION: 93 % | SYSTOLIC BLOOD PRESSURE: 126 MMHG | BODY MASS INDEX: 14.46 KG/M2 | RESPIRATION RATE: 14 BRPM | HEIGHT: 58 IN | HEART RATE: 78 BPM

## 2024-01-23 DIAGNOSIS — R07.9 CHEST PAIN: ICD-10-CM

## 2024-01-23 DIAGNOSIS — I21.4 NSTEMI (NON-ST ELEVATED MYOCARDIAL INFARCTION) (HCC): Primary | ICD-10-CM

## 2024-01-23 DIAGNOSIS — J44.9 CHRONIC OBSTRUCTIVE PULMONARY DISEASE, UNSPECIFIED COPD TYPE (HCC): Primary | ICD-10-CM

## 2024-01-23 DIAGNOSIS — I65.23 BILATERAL CAROTID ARTERY STENOSIS: ICD-10-CM

## 2024-01-23 DIAGNOSIS — R06.89 DYSPNEA AND RESPIRATORY ABNORMALITIES: ICD-10-CM

## 2024-01-23 DIAGNOSIS — R06.00 DYSPNEA AND RESPIRATORY ABNORMALITIES: ICD-10-CM

## 2024-01-23 DIAGNOSIS — E87.29 RESPIRATORY ACIDOSIS: ICD-10-CM

## 2024-01-23 PROBLEM — I25.119 CORONARY ARTERY DISEASE INVOLVING NATIVE CORONARY ARTERY OF NATIVE HEART WITH ANGINA PECTORIS (HCC): Status: ACTIVE | Noted: 2024-01-23

## 2024-01-23 PROBLEM — R79.89 ELEVATED TROPONIN: Status: ACTIVE | Noted: 2024-01-23

## 2024-01-23 PROBLEM — J44.1 CHRONIC OBSTRUCTIVE PULMONARY DISEASE WITH ACUTE EXACERBATION (HCC): Status: ACTIVE | Noted: 2024-01-23

## 2024-01-23 LAB
ALBUMIN SERPL-MCNC: 3.7 GM/DL (ref 3.4–5)
ALP BLD-CCNC: 87 IU/L (ref 40–129)
ALT SERPL-CCNC: 41 U/L (ref 10–40)
ANION GAP SERPL CALCULATED.3IONS-SCNC: 10 MMOL/L (ref 7–16)
ANTI-XA UNFRAC HEPARIN: 0.12 IU/ML (ref 0.3–0.7)
ANTI-XA UNFRAC HEPARIN: 0.28 IU/ML (ref 0.3–0.7)
ANTI-XA UNFRAC HEPARIN: <0.1 IU/ML (ref 0.3–0.7)
APTT: 40.5 SECONDS (ref 25.1–37.1)
AST SERPL-CCNC: 65 IU/L (ref 15–37)
B PARAP IS1001 DNA NPH QL NAA+NON-PROBE: NOT DETECTED
B PERT.PT PRMT NPH QL NAA+NON-PROBE: NOT DETECTED
BASE EXCESS MIXED: 0.4 (ref 0–3)
BASE EXCESS MIXED: 2.1 (ref 0–3)
BASE EXCESS: ABNORMAL (ref 0–3)
BASE EXCESS: NORMAL (ref 0–3)
BASOPHILS ABSOLUTE: 0.1 K/CU MM
BASOPHILS RELATIVE PERCENT: 0.7 % (ref 0–1)
BILIRUB SERPL-MCNC: 0.2 MG/DL (ref 0–1)
BUN SERPL-MCNC: 17 MG/DL (ref 6–23)
C PNEUM DNA NPH QL NAA+NON-PROBE: NOT DETECTED
CALCIUM SERPL-MCNC: 8 MG/DL (ref 8.3–10.6)
CHLORIDE BLD-SCNC: 106 MMOL/L (ref 99–110)
CO2 CONTENT: 27.5 MMOL/L (ref 21–32)
CO2 CONTENT: 33.1 MMOL/L (ref 21–32)
CO2: 25 MMOL/L (ref 21–32)
CREAT SERPL-MCNC: 0.6 MG/DL (ref 0.6–1.1)
DIFFERENTIAL TYPE: ABNORMAL
EKG ATRIAL RATE: 93 BPM
EKG DIAGNOSIS: NORMAL
EKG P AXIS: 91 DEGREES
EKG P-R INTERVAL: 110 MS
EKG Q-T INTERVAL: 348 MS
EKG QRS DURATION: 70 MS
EKG QTC CALCULATION (BAZETT): 432 MS
EKG R AXIS: 58 DEGREES
EKG T AXIS: 232 DEGREES
EKG VENTRICULAR RATE: 93 BPM
EOSINOPHILS ABSOLUTE: 0.1 K/CU MM
EOSINOPHILS RELATIVE PERCENT: 1.5 % (ref 0–3)
FLUAV H1 2009 PAN RNA NPH NAA+NON-PROBE: NOT DETECTED
FLUAV H1 RNA NPH QL NAA+NON-PROBE: NOT DETECTED
FLUAV H3 RNA NPH QL NAA+NON-PROBE: NOT DETECTED
FLUAV RNA NPH QL NAA+NON-PROBE: NOT DETECTED
FLUBV RNA NPH QL NAA+NON-PROBE: NOT DETECTED
GFR SERPL CREATININE-BSD FRML MDRD: >60 ML/MIN/1.73M2
GLUCOSE SERPL-MCNC: 109 MG/DL (ref 70–99)
HADV DNA NPH QL NAA+NON-PROBE: NOT DETECTED
HCO3 VENOUS: 26 MMOL/L (ref 22–29)
HCO3 VENOUS: 31.1 MMOL/L (ref 22–29)
HCOV 229E RNA NPH QL NAA+NON-PROBE: NOT DETECTED
HCOV HKU1 RNA NPH QL NAA+NON-PROBE: NOT DETECTED
HCOV NL63 RNA NPH QL NAA+NON-PROBE: NOT DETECTED
HCOV OC43 RNA NPH QL NAA+NON-PROBE: NOT DETECTED
HCT VFR BLD CALC: 46.7 % (ref 37–47)
HEMOGLOBIN: 15.6 GM/DL (ref 12.5–16)
HMPV RNA NPH QL NAA+NON-PROBE: NOT DETECTED
HPIV1 RNA NPH QL NAA+NON-PROBE: NOT DETECTED
HPIV2 RNA NPH QL NAA+NON-PROBE: NOT DETECTED
HPIV3 RNA NPH QL NAA+NON-PROBE: NOT DETECTED
HPIV4 RNA NPH QL NAA+NON-PROBE: NOT DETECTED
IMMATURE NEUTROPHIL %: 0.3 % (ref 0–0.43)
INFLUENZA A ANTIGEN: NOT DETECTED
INFLUENZA B ANTIGEN: NOT DETECTED
INR BLD: 1.1 INDEX
LACTIC ACID, SEPSIS: 1.5 MMOL/L (ref 0.4–2)
LYMPHOCYTES ABSOLUTE: 2.4 K/CU MM
LYMPHOCYTES RELATIVE PERCENT: 25.5 % (ref 24–44)
M PNEUMO DNA NPH QL NAA+NON-PROBE: NOT DETECTED
MAGNESIUM: 1.9 MG/DL (ref 1.8–2.4)
MCH RBC QN AUTO: 32.8 PG (ref 27–31)
MCHC RBC AUTO-ENTMCNC: 33.4 % (ref 32–36)
MCV RBC AUTO: 98.3 FL (ref 78–100)
MONOCYTES ABSOLUTE: 0.5 K/CU MM
MONOCYTES RELATIVE PERCENT: 5.1 % (ref 0–4)
O2 SAT, VEN: 62.5 % (ref 50–70)
O2 SAT, VEN: 67.3 % (ref 50–70)
PCO2, VEN: 47.9 MMHG (ref 41–51)
PCO2, VEN: 64.9 MMHG (ref 41–51)
PDW BLD-RTO: 11.9 % (ref 11.7–14.9)
PH VENOUS: 7.29 (ref 7.32–7.43)
PH VENOUS: 7.34 (ref 7.32–7.43)
PLATELET # BLD: 203 K/CU MM (ref 140–440)
PMV BLD AUTO: 10.9 FL (ref 7.5–11.1)
PO2, VEN: 37.4 MMHG (ref 28–48)
PO2, VEN: 37.4 MMHG (ref 28–48)
POTASSIUM SERPL-SCNC: 3.4 MMOL/L (ref 3.5–5.1)
PRO-BNP: 225 PG/ML
PROCALCITONIN SERPL-MCNC: 0.07 NG/ML
PROTHROMBIN TIME: 14.6 SECONDS (ref 11.7–14.5)
RBC # BLD: 4.75 M/CU MM (ref 4.2–5.4)
RSV RNA NPH QL NAA+NON-PROBE: NOT DETECTED
RV+EV RNA NPH QL NAA+NON-PROBE: NOT DETECTED
SARS-COV-2 RDRP RESP QL NAA+PROBE: NOT DETECTED
SARS-COV-2 RNA NPH QL NAA+NON-PROBE: NOT DETECTED
SEGMENTED NEUTROPHILS ABSOLUTE COUNT: 6.4 K/CU MM
SEGMENTED NEUTROPHILS RELATIVE PERCENT: 66.9 % (ref 36–66)
SODIUM BLD-SCNC: 141 MMOL/L (ref 135–145)
SOURCE, BLOOD GAS: ABNORMAL
SOURCE, BLOOD GAS: NORMAL
SOURCE: NORMAL
TOTAL IMMATURE NEUTOROPHIL: 0.03 K/CU MM
TOTAL PROTEIN: 5.4 GM/DL (ref 6.4–8.2)
TROPONIN, HIGH SENSITIVITY: 197 NG/L (ref 0–14)
TROPONIN, HIGH SENSITIVITY: 209 NG/L (ref 0–14)
TROPONIN, HIGH SENSITIVITY: 70 NG/L (ref 0–14)
WBC # BLD: 9.6 K/CU MM (ref 4–10.5)

## 2024-01-23 PROCEDURE — 99285 EMERGENCY DEPT VISIT HI MDM: CPT

## 2024-01-23 PROCEDURE — 84484 ASSAY OF TROPONIN QUANT: CPT

## 2024-01-23 PROCEDURE — 93010 ELECTROCARDIOGRAM REPORT: CPT | Performed by: INTERNAL MEDICINE

## 2024-01-23 PROCEDURE — 96375 TX/PRO/DX INJ NEW DRUG ADDON: CPT

## 2024-01-23 PROCEDURE — 83880 ASSAY OF NATRIURETIC PEPTIDE: CPT

## 2024-01-23 PROCEDURE — 85025 COMPLETE CBC W/AUTO DIFF WBC: CPT

## 2024-01-23 PROCEDURE — 36415 COLL VENOUS BLD VENIPUNCTURE: CPT

## 2024-01-23 PROCEDURE — 94660 CPAP INITIATION&MGMT: CPT

## 2024-01-23 PROCEDURE — 2580000003 HC RX 258: Performed by: FAMILY MEDICINE

## 2024-01-23 PROCEDURE — 6370000000 HC RX 637 (ALT 250 FOR IP): Performed by: FAMILY MEDICINE

## 2024-01-23 PROCEDURE — 96376 TX/PRO/DX INJ SAME DRUG ADON: CPT

## 2024-01-23 PROCEDURE — 2500000003 HC RX 250 WO HCPCS: Performed by: PHYSICIAN ASSISTANT

## 2024-01-23 PROCEDURE — 87081 CULTURE SCREEN ONLY: CPT

## 2024-01-23 PROCEDURE — 83605 ASSAY OF LACTIC ACID: CPT

## 2024-01-23 PROCEDURE — 0202U NFCT DS 22 TRGT SARS-COV-2: CPT

## 2024-01-23 PROCEDURE — 6370000000 HC RX 637 (ALT 250 FOR IP): Performed by: PHYSICIAN ASSISTANT

## 2024-01-23 PROCEDURE — 6370000000 HC RX 637 (ALT 250 FOR IP): Performed by: EMERGENCY MEDICINE

## 2024-01-23 PROCEDURE — 87502 INFLUENZA DNA AMP PROBE: CPT

## 2024-01-23 PROCEDURE — 6360000002 HC RX W HCPCS: Performed by: INTERNAL MEDICINE

## 2024-01-23 PROCEDURE — G0378 HOSPITAL OBSERVATION PER HR: HCPCS

## 2024-01-23 PROCEDURE — 6360000002 HC RX W HCPCS: Performed by: EMERGENCY MEDICINE

## 2024-01-23 PROCEDURE — 2140000000 HC CCU INTERMEDIATE R&B

## 2024-01-23 PROCEDURE — 85730 THROMBOPLASTIN TIME PARTIAL: CPT

## 2024-01-23 PROCEDURE — 87205 SMEAR GRAM STAIN: CPT

## 2024-01-23 PROCEDURE — 94640 AIRWAY INHALATION TREATMENT: CPT

## 2024-01-23 PROCEDURE — 2580000003 HC RX 258: Performed by: PHYSICIAN ASSISTANT

## 2024-01-23 PROCEDURE — 85520 HEPARIN ASSAY: CPT

## 2024-01-23 PROCEDURE — 94761 N-INVAS EAR/PLS OXIMETRY MLT: CPT

## 2024-01-23 PROCEDURE — 84145 PROCALCITONIN (PCT): CPT

## 2024-01-23 PROCEDURE — 71045 X-RAY EXAM CHEST 1 VIEW: CPT

## 2024-01-23 PROCEDURE — 99254 IP/OBS CNSLTJ NEW/EST MOD 60: CPT | Performed by: INTERNAL MEDICINE

## 2024-01-23 PROCEDURE — 87070 CULTURE OTHR SPECIMN AEROBIC: CPT

## 2024-01-23 PROCEDURE — 87635 SARS-COV-2 COVID-19 AMP PRB: CPT

## 2024-01-23 PROCEDURE — 80048 BASIC METABOLIC PNL TOTAL CA: CPT

## 2024-01-23 PROCEDURE — 85610 PROTHROMBIN TIME: CPT

## 2024-01-23 PROCEDURE — 6370000000 HC RX 637 (ALT 250 FOR IP): Performed by: INTERNAL MEDICINE

## 2024-01-23 PROCEDURE — 83735 ASSAY OF MAGNESIUM: CPT

## 2024-01-23 PROCEDURE — 96365 THER/PROPH/DIAG IV INF INIT: CPT

## 2024-01-23 PROCEDURE — 80053 COMPREHEN METABOLIC PANEL: CPT

## 2024-01-23 PROCEDURE — 2580000003 HC RX 258: Performed by: EMERGENCY MEDICINE

## 2024-01-23 PROCEDURE — 6360000002 HC RX W HCPCS: Performed by: PHYSICIAN ASSISTANT

## 2024-01-23 PROCEDURE — 93005 ELECTROCARDIOGRAM TRACING: CPT | Performed by: EMERGENCY MEDICINE

## 2024-01-23 PROCEDURE — 93005 ELECTROCARDIOGRAM TRACING: CPT | Performed by: INTERNAL MEDICINE

## 2024-01-23 PROCEDURE — 6360000002 HC RX W HCPCS: Performed by: FAMILY MEDICINE

## 2024-01-23 PROCEDURE — 87430 STREP A AG IA: CPT

## 2024-01-23 PROCEDURE — 82805 BLOOD GASES W/O2 SATURATION: CPT

## 2024-01-23 RX ORDER — MAGNESIUM SULFATE IN WATER 40 MG/ML
2000 INJECTION, SOLUTION INTRAVENOUS PRN
Status: DISCONTINUED | OUTPATIENT
Start: 2024-01-23 | End: 2024-01-25 | Stop reason: HOSPADM

## 2024-01-23 RX ORDER — ONDANSETRON 4 MG/1
4 TABLET, ORALLY DISINTEGRATING ORAL EVERY 8 HOURS PRN
Status: CANCELLED | OUTPATIENT
Start: 2024-01-23

## 2024-01-23 RX ORDER — ACETAMINOPHEN 325 MG/1
650 TABLET ORAL EVERY 6 HOURS PRN
Status: DISCONTINUED | OUTPATIENT
Start: 2024-01-23 | End: 2024-01-23 | Stop reason: HOSPADM

## 2024-01-23 RX ORDER — PANTOPRAZOLE SODIUM 40 MG/1
40 TABLET, DELAYED RELEASE ORAL
Status: DISCONTINUED | OUTPATIENT
Start: 2024-01-24 | End: 2024-01-25 | Stop reason: HOSPADM

## 2024-01-23 RX ORDER — HEPARIN SODIUM 1000 [USP'U]/ML
2000 INJECTION, SOLUTION INTRAVENOUS; SUBCUTANEOUS PRN
Status: DISCONTINUED | OUTPATIENT
Start: 2024-01-23 | End: 2024-01-23 | Stop reason: HOSPADM

## 2024-01-23 RX ORDER — BENZONATATE 100 MG/1
100 CAPSULE ORAL 3 TIMES DAILY PRN
Status: DISCONTINUED | OUTPATIENT
Start: 2024-01-23 | End: 2024-01-23 | Stop reason: HOSPADM

## 2024-01-23 RX ORDER — HEPARIN SODIUM 1000 [USP'U]/ML
2000 INJECTION, SOLUTION INTRAVENOUS; SUBCUTANEOUS ONCE
Status: COMPLETED | OUTPATIENT
Start: 2024-01-23 | End: 2024-01-23

## 2024-01-23 RX ORDER — PREDNISONE 20 MG/1
40 TABLET ORAL DAILY
Status: DISCONTINUED | OUTPATIENT
Start: 2024-01-23 | End: 2024-01-23 | Stop reason: HOSPADM

## 2024-01-23 RX ORDER — ONDANSETRON 4 MG/1
4 TABLET, ORALLY DISINTEGRATING ORAL EVERY 8 HOURS PRN
Status: DISCONTINUED | OUTPATIENT
Start: 2024-01-23 | End: 2024-01-23 | Stop reason: HOSPADM

## 2024-01-23 RX ORDER — AMLODIPINE BESYLATE 5 MG/1
5 TABLET ORAL DAILY
Status: DISCONTINUED | OUTPATIENT
Start: 2024-01-24 | End: 2024-01-24 | Stop reason: SDUPTHER

## 2024-01-23 RX ORDER — HYDROXYZINE PAMOATE 25 MG/1
50 CAPSULE ORAL 3 TIMES DAILY PRN
Status: DISCONTINUED | OUTPATIENT
Start: 2024-01-23 | End: 2024-01-23 | Stop reason: HOSPADM

## 2024-01-23 RX ORDER — ALBUTEROL SULFATE 90 UG/1
2 AEROSOL, METERED RESPIRATORY (INHALATION) EVERY 6 HOURS PRN
Status: DISCONTINUED | OUTPATIENT
Start: 2024-01-23 | End: 2024-01-23 | Stop reason: HOSPADM

## 2024-01-23 RX ORDER — SODIUM CHLORIDE 0.9 % (FLUSH) 0.9 %
5-40 SYRINGE (ML) INJECTION PRN
Status: DISCONTINUED | OUTPATIENT
Start: 2024-01-23 | End: 2024-01-25 | Stop reason: HOSPADM

## 2024-01-23 RX ORDER — LEVOFLOXACIN 500 MG/1
250 TABLET, FILM COATED ORAL DAILY
Status: DISCONTINUED | OUTPATIENT
Start: 2024-01-24 | End: 2024-01-23

## 2024-01-23 RX ORDER — LEVOFLOXACIN 5 MG/ML
250 INJECTION, SOLUTION INTRAVENOUS EVERY 24 HOURS
Status: DISCONTINUED | OUTPATIENT
Start: 2024-01-24 | End: 2024-01-23

## 2024-01-23 RX ORDER — POLYETHYLENE GLYCOL 3350 17 G/17G
17 POWDER, FOR SOLUTION ORAL DAILY PRN
Status: DISCONTINUED | OUTPATIENT
Start: 2024-01-23 | End: 2024-01-23 | Stop reason: SDUPTHER

## 2024-01-23 RX ORDER — ALBUTEROL SULFATE 90 UG/1
2 AEROSOL, METERED RESPIRATORY (INHALATION) EVERY 6 HOURS PRN
Status: CANCELLED | OUTPATIENT
Start: 2024-01-23

## 2024-01-23 RX ORDER — 0.9 % SODIUM CHLORIDE 0.9 %
1000 INTRAVENOUS SOLUTION INTRAVENOUS ONCE
Status: COMPLETED | OUTPATIENT
Start: 2024-01-23 | End: 2024-01-23

## 2024-01-23 RX ORDER — AMLODIPINE BESYLATE 5 MG/1
5 TABLET ORAL DAILY
Status: DISCONTINUED | OUTPATIENT
Start: 2024-01-23 | End: 2024-01-23 | Stop reason: HOSPADM

## 2024-01-23 RX ORDER — HYDROXYZINE PAMOATE 25 MG/1
50 CAPSULE ORAL 3 TIMES DAILY PRN
Status: DISCONTINUED | OUTPATIENT
Start: 2024-01-23 | End: 2024-01-24 | Stop reason: SDUPTHER

## 2024-01-23 RX ORDER — ACETAMINOPHEN 650 MG/1
650 SUPPOSITORY RECTAL EVERY 6 HOURS PRN
Status: CANCELLED | OUTPATIENT
Start: 2024-01-23

## 2024-01-23 RX ORDER — ACETAMINOPHEN 500 MG
1000 TABLET ORAL ONCE
Status: COMPLETED | OUTPATIENT
Start: 2024-01-23 | End: 2024-01-23

## 2024-01-23 RX ORDER — ALBUTEROL SULFATE 2.5 MG/3ML
2.5 SOLUTION RESPIRATORY (INHALATION)
Status: DISCONTINUED | OUTPATIENT
Start: 2024-01-23 | End: 2024-01-23

## 2024-01-23 RX ORDER — POTASSIUM CHLORIDE 20 MEQ/1
20 TABLET, EXTENDED RELEASE ORAL ONCE
Status: COMPLETED | OUTPATIENT
Start: 2024-01-23 | End: 2024-01-23

## 2024-01-23 RX ORDER — ONDANSETRON 2 MG/ML
4 INJECTION INTRAMUSCULAR; INTRAVENOUS EVERY 6 HOURS PRN
Status: DISCONTINUED | OUTPATIENT
Start: 2024-01-23 | End: 2024-01-25 | Stop reason: HOSPADM

## 2024-01-23 RX ORDER — ONDANSETRON 2 MG/ML
4 INJECTION INTRAMUSCULAR; INTRAVENOUS EVERY 6 HOURS PRN
Status: DISCONTINUED | OUTPATIENT
Start: 2024-01-23 | End: 2024-01-23 | Stop reason: HOSPADM

## 2024-01-23 RX ORDER — GABAPENTIN 600 MG/1
600 TABLET ORAL DAILY
Status: CANCELLED | OUTPATIENT
Start: 2024-01-24

## 2024-01-23 RX ORDER — ALBUTEROL SULFATE 2.5 MG/3ML
SOLUTION RESPIRATORY (INHALATION)
Status: DISPENSED
Start: 2024-01-23 | End: 2024-01-23

## 2024-01-23 RX ORDER — BENZONATATE 100 MG/1
100 CAPSULE ORAL ONCE
Status: COMPLETED | OUTPATIENT
Start: 2024-01-23 | End: 2024-01-23

## 2024-01-23 RX ORDER — HEPARIN SODIUM 1000 [USP'U]/ML
2000 INJECTION, SOLUTION INTRAVENOUS; SUBCUTANEOUS PRN
Status: CANCELLED | OUTPATIENT
Start: 2024-01-23

## 2024-01-23 RX ORDER — POTASSIUM CHLORIDE 20 MEQ/1
40 TABLET, EXTENDED RELEASE ORAL PRN
Status: DISCONTINUED | OUTPATIENT
Start: 2024-01-23 | End: 2024-01-25 | Stop reason: HOSPADM

## 2024-01-23 RX ORDER — ACETAMINOPHEN 325 MG/1
650 TABLET ORAL EVERY 6 HOURS PRN
Status: DISCONTINUED | OUTPATIENT
Start: 2024-01-23 | End: 2024-01-23 | Stop reason: SDUPTHER

## 2024-01-23 RX ORDER — ROSUVASTATIN CALCIUM 20 MG/1
20 TABLET, COATED ORAL NIGHTLY
Status: DISCONTINUED | OUTPATIENT
Start: 2024-01-23 | End: 2024-01-24 | Stop reason: SDUPTHER

## 2024-01-23 RX ORDER — IPRATROPIUM BROMIDE AND ALBUTEROL SULFATE 2.5; .5 MG/3ML; MG/3ML
1 SOLUTION RESPIRATORY (INHALATION)
Status: DISCONTINUED | OUTPATIENT
Start: 2024-01-24 | End: 2024-01-24

## 2024-01-23 RX ORDER — ONDANSETRON 2 MG/ML
4 INJECTION INTRAMUSCULAR; INTRAVENOUS EVERY 6 HOURS PRN
Status: CANCELLED | OUTPATIENT
Start: 2024-01-23

## 2024-01-23 RX ORDER — PREDNISONE 20 MG/1
40 TABLET ORAL DAILY
Status: CANCELLED | OUTPATIENT
Start: 2024-01-24 | End: 2024-01-28

## 2024-01-23 RX ORDER — SODIUM CHLORIDE 0.9 % (FLUSH) 0.9 %
5-40 SYRINGE (ML) INJECTION PRN
Status: CANCELLED | OUTPATIENT
Start: 2024-01-23

## 2024-01-23 RX ORDER — PREDNISONE 20 MG/1
40 TABLET ORAL DAILY
Status: DISCONTINUED | OUTPATIENT
Start: 2024-01-24 | End: 2024-01-24

## 2024-01-23 RX ORDER — SODIUM CHLORIDE 9 MG/ML
INJECTION, SOLUTION INTRAVENOUS PRN
Status: DISCONTINUED | OUTPATIENT
Start: 2024-01-23 | End: 2024-01-23 | Stop reason: HOSPADM

## 2024-01-23 RX ORDER — PANTOPRAZOLE SODIUM 40 MG/1
40 TABLET, DELAYED RELEASE ORAL
Status: CANCELLED | OUTPATIENT
Start: 2024-01-24

## 2024-01-23 RX ORDER — IPRATROPIUM BROMIDE AND ALBUTEROL SULFATE 2.5; .5 MG/3ML; MG/3ML
1 SOLUTION RESPIRATORY (INHALATION)
Status: DISCONTINUED | OUTPATIENT
Start: 2024-01-23 | End: 2024-01-23 | Stop reason: HOSPADM

## 2024-01-23 RX ORDER — POLYETHYLENE GLYCOL 3350 17 G/17G
17 POWDER, FOR SOLUTION ORAL DAILY PRN
Status: DISCONTINUED | OUTPATIENT
Start: 2024-01-23 | End: 2024-01-25 | Stop reason: HOSPADM

## 2024-01-23 RX ORDER — ACETAMINOPHEN 650 MG/1
650 SUPPOSITORY RECTAL EVERY 6 HOURS PRN
Status: DISCONTINUED | OUTPATIENT
Start: 2024-01-23 | End: 2024-01-23 | Stop reason: HOSPADM

## 2024-01-23 RX ORDER — GABAPENTIN 300 MG/1
600 CAPSULE ORAL DAILY
Status: DISCONTINUED | OUTPATIENT
Start: 2024-01-24 | End: 2024-01-23

## 2024-01-23 RX ORDER — LEVOFLOXACIN 5 MG/ML
500 INJECTION, SOLUTION INTRAVENOUS ONCE
Status: DISCONTINUED | OUTPATIENT
Start: 2024-01-23 | End: 2024-01-23

## 2024-01-23 RX ORDER — GABAPENTIN 600 MG/1
600 TABLET ORAL DAILY
Status: DISCONTINUED | OUTPATIENT
Start: 2024-01-23 | End: 2024-01-23 | Stop reason: HOSPADM

## 2024-01-23 RX ORDER — HEPARIN SODIUM 1000 [USP'U]/ML
1000 INJECTION, SOLUTION INTRAVENOUS; SUBCUTANEOUS PRN
Status: CANCELLED | OUTPATIENT
Start: 2024-01-23

## 2024-01-23 RX ORDER — POLYETHYLENE GLYCOL 3350 17 G/17G
17 POWDER, FOR SOLUTION ORAL DAILY PRN
Status: CANCELLED | OUTPATIENT
Start: 2024-01-23

## 2024-01-23 RX ORDER — SODIUM CHLORIDE 9 MG/ML
INJECTION, SOLUTION INTRAVENOUS PRN
Status: DISCONTINUED | OUTPATIENT
Start: 2024-01-23 | End: 2024-01-24 | Stop reason: SDUPTHER

## 2024-01-23 RX ORDER — ENOXAPARIN SODIUM 100 MG/ML
30 INJECTION SUBCUTANEOUS DAILY
Status: DISCONTINUED | OUTPATIENT
Start: 2024-01-23 | End: 2024-01-23

## 2024-01-23 RX ORDER — SODIUM CHLORIDE 0.9 % (FLUSH) 0.9 %
5-40 SYRINGE (ML) INJECTION EVERY 12 HOURS SCHEDULED
Status: DISCONTINUED | OUTPATIENT
Start: 2024-01-23 | End: 2024-01-24 | Stop reason: SDUPTHER

## 2024-01-23 RX ORDER — LEVOFLOXACIN 500 MG/1
500 TABLET, FILM COATED ORAL DAILY
Status: DISCONTINUED | OUTPATIENT
Start: 2024-01-23 | End: 2024-01-23

## 2024-01-23 RX ORDER — BENZONATATE 100 MG/1
100 CAPSULE ORAL 3 TIMES DAILY PRN
Status: DISCONTINUED | OUTPATIENT
Start: 2024-01-23 | End: 2024-01-24 | Stop reason: SDUPTHER

## 2024-01-23 RX ORDER — IPRATROPIUM BROMIDE AND ALBUTEROL SULFATE 2.5; .5 MG/3ML; MG/3ML
1 SOLUTION RESPIRATORY (INHALATION) ONCE
Status: COMPLETED | OUTPATIENT
Start: 2024-01-23 | End: 2024-01-23

## 2024-01-23 RX ORDER — BENZONATATE 100 MG/1
100 CAPSULE ORAL 3 TIMES DAILY
Status: DISCONTINUED | OUTPATIENT
Start: 2024-01-23 | End: 2024-01-25 | Stop reason: HOSPADM

## 2024-01-23 RX ORDER — SODIUM CHLORIDE 0.9 % (FLUSH) 0.9 %
5-40 SYRINGE (ML) INJECTION EVERY 12 HOURS SCHEDULED
Status: CANCELLED | OUTPATIENT
Start: 2024-01-23

## 2024-01-23 RX ORDER — ENOXAPARIN SODIUM 100 MG/ML
40 INJECTION SUBCUTANEOUS DAILY
Status: DISCONTINUED | OUTPATIENT
Start: 2024-01-23 | End: 2024-01-23

## 2024-01-23 RX ORDER — ACETAMINOPHEN 325 MG/1
650 TABLET ORAL EVERY 6 HOURS PRN
Status: DISCONTINUED | OUTPATIENT
Start: 2024-01-23 | End: 2024-01-24

## 2024-01-23 RX ORDER — HEPARIN SODIUM 10000 [USP'U]/100ML
5-30 INJECTION, SOLUTION INTRAVENOUS CONTINUOUS
Status: DISCONTINUED | OUTPATIENT
Start: 2024-01-23 | End: 2024-01-23 | Stop reason: HOSPADM

## 2024-01-23 RX ORDER — HEPARIN SODIUM 10000 [USP'U]/100ML
5-30 INJECTION, SOLUTION INTRAVENOUS CONTINUOUS
Status: DISCONTINUED | OUTPATIENT
Start: 2024-01-23 | End: 2024-01-24

## 2024-01-23 RX ORDER — IPRATROPIUM BROMIDE AND ALBUTEROL SULFATE 2.5; .5 MG/3ML; MG/3ML
1 SOLUTION RESPIRATORY (INHALATION)
Status: CANCELLED | OUTPATIENT
Start: 2024-01-23

## 2024-01-23 RX ORDER — HEPARIN SODIUM 1000 [USP'U]/ML
2000 INJECTION, SOLUTION INTRAVENOUS; SUBCUTANEOUS PRN
Status: DISCONTINUED | OUTPATIENT
Start: 2024-01-23 | End: 2024-01-24

## 2024-01-23 RX ORDER — POTASSIUM CHLORIDE 7.45 MG/ML
10 INJECTION INTRAVENOUS PRN
Status: DISCONTINUED | OUTPATIENT
Start: 2024-01-23 | End: 2024-01-25 | Stop reason: HOSPADM

## 2024-01-23 RX ORDER — ACETAMINOPHEN 650 MG/1
650 SUPPOSITORY RECTAL EVERY 6 HOURS PRN
Status: DISCONTINUED | OUTPATIENT
Start: 2024-01-23 | End: 2024-01-23 | Stop reason: SDUPTHER

## 2024-01-23 RX ORDER — GABAPENTIN 300 MG/1
600 CAPSULE ORAL NIGHTLY
Status: DISCONTINUED | OUTPATIENT
Start: 2024-01-23 | End: 2024-01-24 | Stop reason: SDUPTHER

## 2024-01-23 RX ORDER — ROSUVASTATIN CALCIUM 20 MG/1
20 TABLET, COATED ORAL NIGHTLY
Status: DISCONTINUED | OUTPATIENT
Start: 2024-01-23 | End: 2024-01-23 | Stop reason: HOSPADM

## 2024-01-23 RX ORDER — HEPARIN SODIUM 10000 [USP'U]/100ML
5-30 INJECTION, SOLUTION INTRAVENOUS CONTINUOUS
Status: CANCELLED | OUTPATIENT
Start: 2024-01-23

## 2024-01-23 RX ORDER — ONDANSETRON 4 MG/1
4 TABLET, ORALLY DISINTEGRATING ORAL EVERY 8 HOURS PRN
Status: DISCONTINUED | OUTPATIENT
Start: 2024-01-23 | End: 2024-01-25 | Stop reason: HOSPADM

## 2024-01-23 RX ORDER — PANTOPRAZOLE SODIUM 40 MG/1
40 TABLET, DELAYED RELEASE ORAL
Status: DISCONTINUED | OUTPATIENT
Start: 2024-01-23 | End: 2024-01-23 | Stop reason: SDUPTHER

## 2024-01-23 RX ORDER — ROSUVASTATIN CALCIUM 20 MG/1
20 TABLET, COATED ORAL NIGHTLY
Status: CANCELLED | OUTPATIENT
Start: 2024-01-23

## 2024-01-23 RX ORDER — ACETAMINOPHEN 325 MG/1
650 TABLET ORAL EVERY 6 HOURS PRN
Status: CANCELLED | OUTPATIENT
Start: 2024-01-23

## 2024-01-23 RX ORDER — HYDROXYZINE PAMOATE 25 MG/1
50 CAPSULE ORAL 3 TIMES DAILY PRN
Status: CANCELLED | OUTPATIENT
Start: 2024-01-23

## 2024-01-23 RX ORDER — ALBUTEROL SULFATE 2.5 MG/3ML
2.5 SOLUTION RESPIRATORY (INHALATION) EVERY 6 HOURS PRN
Status: DISCONTINUED | OUTPATIENT
Start: 2024-01-23 | End: 2024-01-23

## 2024-01-23 RX ORDER — ALBUTEROL SULFATE 90 UG/1
2 AEROSOL, METERED RESPIRATORY (INHALATION) EVERY 6 HOURS PRN
Status: DISCONTINUED | OUTPATIENT
Start: 2024-01-23 | End: 2024-01-24 | Stop reason: SDUPTHER

## 2024-01-23 RX ORDER — LEVOFLOXACIN 500 MG/1
250 TABLET, FILM COATED ORAL
Status: DISCONTINUED | OUTPATIENT
Start: 2024-01-24 | End: 2024-01-23

## 2024-01-23 RX ORDER — ASPIRIN 81 MG/1
81 TABLET ORAL DAILY
Status: DISCONTINUED | OUTPATIENT
Start: 2024-01-23 | End: 2024-01-23 | Stop reason: HOSPADM

## 2024-01-23 RX ORDER — ALBUTEROL SULFATE 2.5 MG/3ML
2.5 SOLUTION RESPIRATORY (INHALATION) EVERY 4 HOURS PRN
Status: DISCONTINUED | OUTPATIENT
Start: 2024-01-23 | End: 2024-01-24

## 2024-01-23 RX ORDER — AMLODIPINE BESYLATE 5 MG/1
5 TABLET ORAL DAILY
Status: CANCELLED | OUTPATIENT
Start: 2024-01-24

## 2024-01-23 RX ORDER — HEPARIN SODIUM 1000 [USP'U]/ML
1000 INJECTION, SOLUTION INTRAVENOUS; SUBCUTANEOUS PRN
Status: DISCONTINUED | OUTPATIENT
Start: 2024-01-23 | End: 2024-01-24

## 2024-01-23 RX ORDER — BENZONATATE 100 MG/1
100 CAPSULE ORAL 3 TIMES DAILY PRN
Status: CANCELLED | OUTPATIENT
Start: 2024-01-23

## 2024-01-23 RX ORDER — ASPIRIN 81 MG/1
81 TABLET, CHEWABLE ORAL DAILY
Status: DISCONTINUED | OUTPATIENT
Start: 2024-01-24 | End: 2024-01-24 | Stop reason: SDUPTHER

## 2024-01-23 RX ORDER — SODIUM CHLORIDE 0.9 % (FLUSH) 0.9 %
5-40 SYRINGE (ML) INJECTION EVERY 12 HOURS SCHEDULED
Status: DISCONTINUED | OUTPATIENT
Start: 2024-01-23 | End: 2024-01-23 | Stop reason: HOSPADM

## 2024-01-23 RX ORDER — SODIUM CHLORIDE 9 MG/ML
INJECTION, SOLUTION INTRAVENOUS PRN
Status: CANCELLED | OUTPATIENT
Start: 2024-01-23

## 2024-01-23 RX ORDER — POLYETHYLENE GLYCOL 3350 17 G/17G
17 POWDER, FOR SOLUTION ORAL DAILY PRN
Status: DISCONTINUED | OUTPATIENT
Start: 2024-01-23 | End: 2024-01-23 | Stop reason: HOSPADM

## 2024-01-23 RX ORDER — ACETAMINOPHEN 650 MG/1
650 SUPPOSITORY RECTAL EVERY 6 HOURS PRN
Status: DISCONTINUED | OUTPATIENT
Start: 2024-01-23 | End: 2024-01-25 | Stop reason: HOSPADM

## 2024-01-23 RX ORDER — ASPIRIN 81 MG/1
81 TABLET ORAL DAILY
Status: CANCELLED | OUTPATIENT
Start: 2024-01-24

## 2024-01-23 RX ORDER — LEVOFLOXACIN 5 MG/ML
500 INJECTION, SOLUTION INTRAVENOUS ONCE
Status: COMPLETED | OUTPATIENT
Start: 2024-01-23 | End: 2024-01-23

## 2024-01-23 RX ORDER — SODIUM CHLORIDE 0.9 % (FLUSH) 0.9 %
5-40 SYRINGE (ML) INJECTION PRN
Status: DISCONTINUED | OUTPATIENT
Start: 2024-01-23 | End: 2024-01-23 | Stop reason: HOSPADM

## 2024-01-23 RX ORDER — ROSUVASTATIN CALCIUM 20 MG/1
20 TABLET, COATED ORAL DAILY
Status: DISCONTINUED | OUTPATIENT
Start: 2024-01-23 | End: 2024-01-23

## 2024-01-23 RX ORDER — IPRATROPIUM BROMIDE AND ALBUTEROL SULFATE 2.5; .5 MG/3ML; MG/3ML
SOLUTION RESPIRATORY (INHALATION)
Status: DISPENSED
Start: 2024-01-23 | End: 2024-01-23

## 2024-01-23 RX ORDER — HEPARIN SODIUM 1000 [USP'U]/ML
1000 INJECTION, SOLUTION INTRAVENOUS; SUBCUTANEOUS PRN
Status: DISCONTINUED | OUTPATIENT
Start: 2024-01-23 | End: 2024-01-23 | Stop reason: HOSPADM

## 2024-01-23 RX ORDER — PANTOPRAZOLE SODIUM 40 MG/1
40 TABLET, DELAYED RELEASE ORAL
Status: DISCONTINUED | OUTPATIENT
Start: 2024-01-23 | End: 2024-01-23 | Stop reason: HOSPADM

## 2024-01-23 RX ADMIN — ACETAMINOPHEN 650 MG: 325 TABLET ORAL at 11:04

## 2024-01-23 RX ADMIN — AMLODIPINE BESYLATE 5 MG: 5 TABLET ORAL at 09:20

## 2024-01-23 RX ADMIN — HEPARIN SODIUM 939 UNITS: 1000 INJECTION INTRAVENOUS; SUBCUTANEOUS at 18:08

## 2024-01-23 RX ADMIN — HEPARIN SODIUM 12 UNITS/KG/HR: 10000 INJECTION, SOLUTION INTRAVENOUS at 10:58

## 2024-01-23 RX ADMIN — BENZONATATE 100 MG: 100 CAPSULE ORAL at 22:05

## 2024-01-23 RX ADMIN — HEPARIN SODIUM 2000 UNITS: 1000 INJECTION INTRAVENOUS; SUBCUTANEOUS at 10:52

## 2024-01-23 RX ADMIN — IPRATROPIUM BROMIDE AND ALBUTEROL SULFATE 1 DOSE: 2.5; .5 SOLUTION RESPIRATORY (INHALATION) at 16:27

## 2024-01-23 RX ADMIN — BENZONATATE 100 MG: 100 CAPSULE ORAL at 01:13

## 2024-01-23 RX ADMIN — LEVOFLOXACIN 500 MG: 500 INJECTION, SOLUTION INTRAVENOUS at 09:32

## 2024-01-23 RX ADMIN — SODIUM CHLORIDE 25 ML: 9 INJECTION, SOLUTION INTRAVENOUS at 09:31

## 2024-01-23 RX ADMIN — SODIUM CHLORIDE, PRESERVATIVE FREE 10 ML: 5 INJECTION INTRAVENOUS at 22:05

## 2024-01-23 RX ADMIN — HEPARIN SODIUM 12 UNITS/KG/HR: 10000 INJECTION, SOLUTION INTRAVENOUS at 20:57

## 2024-01-23 RX ADMIN — METOPROLOL TARTRATE 25 MG: 25 TABLET, FILM COATED ORAL at 09:21

## 2024-01-23 RX ADMIN — PREDNISONE 40 MG: 20 TABLET ORAL at 09:21

## 2024-01-23 RX ADMIN — GABAPENTIN 600 MG: 300 CAPSULE ORAL at 22:04

## 2024-01-23 RX ADMIN — ALBUTEROL SULFATE 2.5 MG: 2.5 SOLUTION RESPIRATORY (INHALATION) at 21:53

## 2024-01-23 RX ADMIN — IPRATROPIUM BROMIDE AND ALBUTEROL SULFATE 1 DOSE: 2.5; .5 SOLUTION RESPIRATORY (INHALATION) at 00:37

## 2024-01-23 RX ADMIN — POTASSIUM CHLORIDE 20 MEQ: 1500 TABLET, EXTENDED RELEASE ORAL at 22:04

## 2024-01-23 RX ADMIN — SODIUM CHLORIDE 1000 ML: 9 INJECTION, SOLUTION INTRAVENOUS at 01:10

## 2024-01-23 RX ADMIN — ALBUTEROL SULFATE 2.5 MG: 2.5 SOLUTION RESPIRATORY (INHALATION) at 13:50

## 2024-01-23 RX ADMIN — PANTOPRAZOLE SODIUM 40 MG: 40 TABLET, DELAYED RELEASE ORAL at 05:36

## 2024-01-23 RX ADMIN — IPRATROPIUM BROMIDE AND ALBUTEROL SULFATE 1 DOSE: 2.5; .5 SOLUTION RESPIRATORY (INHALATION) at 00:38

## 2024-01-23 RX ADMIN — ALBUTEROL SULFATE 2.5 MG: 2.5 SOLUTION RESPIRATORY (INHALATION) at 11:14

## 2024-01-23 RX ADMIN — METOPROLOL TARTRATE 25 MG: 25 TABLET, FILM COATED ORAL at 20:48

## 2024-01-23 RX ADMIN — ACETAMINOPHEN 1000 MG: 500 TABLET ORAL at 01:13

## 2024-01-23 RX ADMIN — ASPIRIN 81 MG: 81 TABLET, COATED ORAL at 09:21

## 2024-01-23 RX ADMIN — ROSUVASTATIN CALCIUM 20 MG: 20 TABLET, COATED ORAL at 20:48

## 2024-01-23 RX ADMIN — GABAPENTIN 600 MG: 600 TABLET, FILM COATED ORAL at 09:20

## 2024-01-23 RX ADMIN — ALBUTEROL SULFATE 2.5 MG: 2.5 SOLUTION RESPIRATORY (INHALATION) at 07:33

## 2024-01-23 RX ADMIN — SODIUM CHLORIDE, PRESERVATIVE FREE 10 ML: 5 INJECTION INTRAVENOUS at 09:20

## 2024-01-23 ASSESSMENT — PAIN - FUNCTIONAL ASSESSMENT: PAIN_FUNCTIONAL_ASSESSMENT: ACTIVITIES ARE NOT PREVENTED

## 2024-01-23 ASSESSMENT — PAIN DESCRIPTION - DESCRIPTORS
DESCRIPTORS: ACHING
DESCRIPTORS: ACHING;SORE
DESCRIPTORS: ACHING

## 2024-01-23 ASSESSMENT — PAIN DESCRIPTION - ORIENTATION
ORIENTATION: RIGHT;LEFT
ORIENTATION: LEFT

## 2024-01-23 ASSESSMENT — PAIN DESCRIPTION - LOCATION
LOCATION: HEAD;SHOULDER
LOCATION: HEAD;THROAT
LOCATION: HEAD;THROAT

## 2024-01-23 ASSESSMENT — LIFESTYLE VARIABLES: HOW OFTEN DO YOU HAVE A DRINK CONTAINING ALCOHOL: NEVER

## 2024-01-23 ASSESSMENT — PAIN DESCRIPTION - PAIN TYPE: TYPE: CHRONIC PAIN

## 2024-01-23 ASSESSMENT — PAIN DESCRIPTION - ONSET: ONSET: GRADUAL

## 2024-01-23 ASSESSMENT — ENCOUNTER SYMPTOMS
SHORTNESS OF BREATH: 1
RHINORRHEA: 1
COUGH: 1
WHEEZING: 1
EYES NEGATIVE: 1
ALLERGIC/IMMUNOLOGIC NEGATIVE: 1
GASTROINTESTINAL NEGATIVE: 1
SORE THROAT: 1

## 2024-01-23 ASSESSMENT — PAIN SCALES - GENERAL
PAINLEVEL_OUTOF10: 2
PAINLEVEL_OUTOF10: 3
PAINLEVEL_OUTOF10: 5
PAINLEVEL_OUTOF10: 6

## 2024-01-23 ASSESSMENT — PAIN DESCRIPTION - FREQUENCY: FREQUENCY: INTERMITTENT

## 2024-01-23 NOTE — DISCHARGE SUMMARY
03/18/2016 03:00 PM    MUCUS 1+ 09/30/2013 05:45 PM    BACTERIA LARGE 03/18/2016 03:00 PM    CLARITYU Dark 02/22/2017 03:37 PM    CLARITYU SL HAZY 03/18/2016 03:00 PM    SPECGRAV 1.025 02/22/2017 03:37 PM    SPECGRAV 1.025 03/18/2016 03:00 PM    LEUKOCYTESUR Negative 02/22/2017 03:37 PM    LEUKOCYTESUR NEGATIVE 03/18/2016 03:00 PM    UROBILINOGEN 0.2 03/18/2016 03:00 PM    BILIRUBINUR Negative 02/22/2017 03:37 PM    BLOODU Negative 02/22/2017 03:37 PM    BLOODU NEGATIVE 03/18/2016 03:00 PM    GLUCOSEU Negative 02/22/2017 03:37 PM    KETUA Trace 02/22/2017 03:37 PM    KETUA NEGATIVE 03/18/2016 03:00 PM     Urine Cultures: No results found for: \"LABURIN\"  Blood Cultures: No results found for: \"BC\"  No results found for: \"BLOODCULT2\"  Organism: No results found for: \"ORG\"    Time Spent Discharging patient 35 minutes    Electronically signed by Mary Kate Infante PA-C on 1/23/2024 at 5:44 PM

## 2024-01-23 NOTE — CARE COORDINATION
01/23/24 0710   Service Assessment   Patient Orientation Alert and Oriented   Cognition Alert   History Provided By Patient   Primary Caregiver Self   Support Systems Children;Other (Comment)  (Lives with her son, son's girlfriend, and her grandchild)   Patient's Healthcare Decision Maker is: Legal Next of Kin   PCP Verified by CM Yes   Prior Functional Level Independent in ADLs/IADLs   Current Functional Level Independent in ADLs/IADLs   Can patient return to prior living arrangement Yes   Ability to make needs known: Good   Family able to assist with home care needs: Yes   Would you like for me to discuss the discharge plan with any other family members/significant others, and if so, who? No   Financial Resources Other (Comment)  (No medical insurance)   Social/Functional History   Lives With Family   Home Equipment   (Nebulizer machine)   Receives Help From Family   Active  No   Patient's  Info Son or son's girlfriend   Discharge Planning   Type of Residence Apartment   Living Arrangements Children   Potential Assistance Purchasing Medications Yes   Patient expects to be discharged to: Apartment   Services At/After Discharge   Transition of Care Consult (CM Consult) Medication Assistance    Resource Information Provided? No   Mode of Transport at Discharge Other (see comment)  (Son or son's girlfriend)   Confirm Follow Up Transport Family   Condition of Participation: Discharge Planning   The Plan for Transition of Care is related to the following treatment goals: Patient plans to return home   The Patient and/or Patient Representative was provided with a Choice of Provider? Patient   The Patient and/Or Patient Representative agree with the Discharge Plan? Yes   Freedom of Choice list was provided with basic dialogue that supports the patient's individualized plan of care/goals, treatment preferences, and shares the quality data associated with the providers?  Yes     CM met with the patient

## 2024-01-23 NOTE — CONSULTS
CARDIOLOGY INITIAL VIST    Kate Minor  1960      Referring physician:   Pankaj Bowling MD     Primary care physician:  Sharyn Delatorre, APRN - CNP    Reason for consultation: Elevated troponin        History of present illness: 63-year-old female with a known coronary artery disease and possibly cerebrovascular disease admitted yesterday with altered mentation and shortness of breath and fatigue and noted to have elevated troponins reason for cardiology consultation.  She reports she has not taken any of her medications for the last 2 months because of financial reasons and reports her son has been sick also and had not followed up with physicians because of financial reasons.  She also reports she was in the hospital in [place] 1 and half year ago and they wanted to put stents in her arteries and they wanted upfront cash payment since she could not afford and did not get it done.  Records are reviewed.  She had a cardiac cath in 2016 by Dr. Rubio had moderate two-vessel coronary artery disease had not followed up since then.  She describes extreme dyspnea on exertion going from bed to the bathroom she gets very short of breath.  She also gets some chest discomfort.  Physically not very active continues to smoke 1 pack of cigarettes daily.    REVIEW OF SYSTEMS: Has been to Dr. Marsh and has emphysemaHas Salas's esophagus multiple GI issues.  Questionable history of TIA.  History of cerebrovascular disease.    Past medical history:  has a past medical history of Salas's esophagus, Elevated troponin, Emphysema of lung (HCC), H/O Doppler ultrasound carotid, H/O echocardiogram, H/O exercise stress test, History of Holter monitoring, History of nuclear stress test, HLD (hyperlipidemia), Hypertension, NSTEMI (non-ST elevated myocardial infarction) (HCC), Pulmonary emphysema (HCC), and TIA (transient ischemic attack).    Past surgical history:  has a

## 2024-01-23 NOTE — CARE COORDINATION
Received referral from CM, pt may need assistance with medication cost(s) at time of discharge. Will follow pt as requested.

## 2024-01-23 NOTE — DISCHARGE INSTRUCTIONS
Please follow-up with [facility] for smoking cessation programs:  [facility]  Outpatient treatment for both men & women  [address]   [place] [address]  [phone]    [address] [facility] [id number] [phone]

## 2024-01-23 NOTE — ED PROVIDER NOTES
Resource Strain: Not on file   Food Insecurity: Not on file   Transportation Needs: Not on file   Physical Activity: Not on file   Stress: Not on file   Social Connections: Not on file   Intimate Partner Violence: Not on file   Housing Stability: Not on file     Current Facility-Administered Medications   Medication Dose Route Frequency Provider Last Rate Last Admin    albuterol (PROVENTIL) (2.5 MG/3ML) 0.083% nebulizer solution             ipratropium 0.5 mg-albuterol 2.5 mg (DUONEB) 0.5-2.5 (3) MG/3ML nebulizer solution             levoFLOXacin (LEVAQUIN) 500 MG/100ML infusion 500 mg  500 mg IntraVENous Once Shalom Schaefer DO        sodium chloride flush 0.9 % injection 5-40 mL  5-40 mL IntraVENous 2 times per day Carine Saucedo APRN - CNP        sodium chloride flush 0.9 % injection 5-40 mL  5-40 mL IntraVENous PRN Carine Saucedo APRN - CNP        0.9 % sodium chloride infusion   IntraVENous PRN Carine Saucedo APRN - CNP        ondansetron (ZOFRAN-ODT) disintegrating tablet 4 mg  4 mg Oral Q8H PRN Carine Saucedo APRN - CNP        Or    ondansetron (ZOFRAN) injection 4 mg  4 mg IntraVENous Q6H PRN Carine Saucedo APRN - CNP        polyethylene glycol (GLYCOLAX) packet 17 g  17 g Oral Daily PRN Carine Saucedo APRN - CNP        enoxaparin (LOVENOX) injection 40 mg  40 mg SubCUTAneous Daily Carine Saucedo APRN - CNP        acetaminophen (TYLENOL) tablet 650 mg  650 mg Oral Q6H PRN Carine Saucedo APRN - CNP        Or    acetaminophen (TYLENOL) suppository 650 mg  650 mg Rectal Q6H PRN Carine Saucedo APRN - CNP        levoFLOXacin (LEVAQUIN) tablet 500 mg  500 mg Oral Daily Carine Saucedo APRN - CNP        benzonatate (TESSALON) capsule 100 mg  100 mg Oral TID PRN Carine Saucedo APRN - CNP        predniSONE (DELTASONE) tablet 40 mg  40 mg Oral Daily Saucedo, Carine A, APRN - CNP         Current Outpatient Medications   Medication Sig Dispense Refill

## 2024-01-23 NOTE — PROGRESS NOTES
4 Eyes Skin Assessment     NAME:  Kate Minor  YOB: 1960  MEDICAL RECORD NUMBER:  [id number]    The patient is being assessed for  Admission    I agree that at least one RN has performed a thorough Head to Toe Skin Assessment on the patient. ALL assessment sites listed below have been assessed.      Areas assessed by both nurses:    Head, Face, Ears, Shoulders, Back, Chest, Arms, Elbows, Hands, Sacrum. Buttock, Coccyx, Ischium, and Legs. Feet and Heels        Does the Patient have a Wound? No noted wound(s)       Navid Prevention initiated by RN: No  Wound Care Orders initiated by RN: No    Pressure Injury (Stage 3,4, Unstageable, DTI, NWPT, and Complex wounds) if present, place Wound referral order by RN under : No    New Ostomies, if present place, Ostomy referral order under : No     Nurse 1 eSignature: Electronically signed by Avani Mauricio RN on 1/23/24 at 5:48 AM EST    **SHARE this note so that the co-signing nurse can place an eSignature**    Nurse 2 eSignature: Electronically signed by Ching Torres RN on 1/23/24 at 6:29 AM EST   
LOVENOX PROPHYLAXIS EVALUATION  (Populations not addressed in this protocol: trauma, obstetrics, or COVID-19)    Wt Readings from Last 3 Encounters:   01/23/24 31.3 kg (68 lb 14.4 oz)   10/25/23 32.3 kg (71 lb 2 oz)   08/02/23 33.3 kg (73 lb 6.4 oz)       Estimated Creatinine Clearance: 47 mL/min (based on SCr of 0.6 mg/dL).  Recent Labs     01/23/24  0100   BUN 17   CREATININE 0.6      HGB 15.6   HCT 46.7       Weight Range: 50.9 kg and below    CRCL = 30 or greater    50.9 kg   and below     .9  kg   101-150.9 kg   151-174.9  kg   175 kg  or greater     30mg subq  daily     40mg subq daily    (or 30mg subq BID orthopedic)     30mg subq  BID   40mg subq  BID   60mg subq BID       Per P/T protocol for appropriate subq anticoagulation by weight and CRCL change to:    Enoxparin 30mg subq daily      Todd Davis [facility]  5:27 AM  01/23/24        
RENAL DOSE ADJUSTMENT MADE PER P/T PROTOCOL    PREVIOUS ORDER:  Levofloxacin 500 mg q24h    Indication: COPD exacerbation    Estimated Creatinine Clearance: 47 mL/min (based on SCr of 0.6 mg/dL).  Recent Labs     01/23/24  0100   BUN 17   CREATININE 0.6        NEW RENALLY ADJUSTED ORDER:  Levofloxacin 250 mg q24h    Toddtirso Davis [facility]  1/23/2024 5:34 AM      
This RN attempted to call report to [phone] at [facility] but phone just rang with no .  
This RN called [phone] at [facility] and attempted to give report but phone just kept ringing with no answer.  
Influenza A Antigen NOT DETECTED NOT DETECTED    Influenza B Antigen NOT DETECTED NOT DETECTED   Troponin    Collection Time: 01/23/24  2:22 AM   Result Value Ref Range    Troponin, High Sensitivity 197 (HH) 0 - 14 ng/L   Procalcitonin    Collection Time: 01/23/24  2:22 AM   Result Value Ref Range    Procalcitonin 0.070    POCT Venous    Collection Time: 01/23/24  2:36 AM   Result Value Ref Range    pH, Larry 7.34 7.32 - 7.43    pCO2, Larry 47.9 41 - 51 mmHG    pO2, Larry 37.4 28 - 48 mmHG    Base Exc, Mixed 0.4 0 - 3.0    Base Excess MINUS 0 - 3.0    HCO3, Venous 26.0 22 - 29 MMOL/L    O2 Sat, Larry 67.3 50 - 70 %    CO2 Content 27.5 21 - 32 MMOL/L    Source: Venous    Troponin    Collection Time: 01/23/24  5:00 AM   Result Value Ref Range    Troponin, High Sensitivity 209 (HH) 0 - 14 ng/L   APTT    Collection Time: 01/23/24  9:30 AM   Result Value Ref Range    aPTT 40.5 (H) 25.1 - 37.1 SECONDS   Protime-INR    Collection Time: 01/23/24  9:30 AM   Result Value Ref Range    Protime 14.6 (H) 11.7 - 14.5 SECONDS    INR 1.1 INDEX   Anti-Xa, Unfractionated Heparin    Collection Time: 01/23/24  9:30 AM   Result Value Ref Range    Anti-XA Unfrac Heparin <0.10 (L) 0.30 - 0.70 IU/mL        Imaging/Diagnostics Last 24 Hours   XR CHEST PORTABLE    Result Date: 1/23/2024  EXAMINATION: ONE XRAY VIEW OF THE CHEST 1/23/2024 12:44 am COMPARISON: 04/17/2020 radiograph HISTORY: ORDERING SYSTEM PROVIDED HISTORY: dyspnea TECHNOLOGIST PROVIDED HISTORY: Reason for exam:->dyspnea Reason for Exam: dyspnea Additional signs and symptoms: dyspnea FINDINGS: The heart, mediastinum are normal.  The lungs are lucent and hyperinflated with scattered interstitial changes that are chronic.  No acute pulmonary finding.  No skeletal findings.     COPD with no acute finding or significant change.       Electronically signed by Mary Kate Infante PA-C on 1/23/2024 at 2:59 PM

## 2024-01-23 NOTE — H&P
V2.0  History and Physical      Name:  Kate Minor /Age/Sex: 1960  (63 y.o. female)   MRN & CSN:  [id number] & [id number] Encounter Date/Time: 2024 3:13 AM EST   Location:   PCP: Sharyn Delatorre APRN - CNP       Hospital Day: 1    Assessment and Plan:   Kate Minor is a 63 y.o. female with a past medical history of COPD/Emphysema, HTN, TIA, HLD, Salas's Esophagus, Nicotine Dependence who presents with Chronic obstructive pulmonary disease with acute exacerbation (HCC)    Acute respiratory failure with hypoxia 2/2 acute COPD.   - presented with worsening SOB, productive cough, wheezing; SpO2 87% RA in ED  - CXR : COPD with no acute finding or significant change.   - Pt requiring BiPAP in ED; unable to tolerate and wanted mask off  - Initial VBG:   pH 7.29, PCO2 64.9, pO2 37.4, HCO3 31.1  - Repeat s/p BiPAP:    pH 7.34, pCO2 47.9, pO2 37.4, HCO3 26  - Pt denies home O2 use; SpO2 95% RA s/p Duonebs, IV Solu-medrol  - afebrile without leukocytosis (WBC 9.6)  - likely secondary to continued tobacco use  - check procalcitonin, viral respiratory panel NEG COVID/FLU; sputum culture pending  - continue home inhalers; followed by Pulmonology outpt (10/25/23 last office visit)  - start scheduled/PRN nebs, steroids, PO ABX, Tessalon, pulmonary hygiene   - monitor respiratory status    - monitor CBC, CMP daily  - TELE    Elevated Trop  - TROP 70, 197  - Denies CP; admits cough  - EKG:  Sinus rhythm with short WA with premature atrial complexes  Right atrial enlargement; Left ventricular hypertrophy with   repolarization abnormality; Abnormal ECG  When compared with ECG of 24-SEP-2016 14:21,  premature atrial complexes are now present  Vent. rate has increased BY  38 BPM; ST now depressed in Inferior   Leads; ST now depressed in Lateral leads  - ECHO 23: EF 55-60%, RVSP 45 mmHG  - Will trend TROP, EKG; monitor for changes  - TELE    Hypertension   - /73 (90) on admit   - continue home

## 2024-01-23 NOTE — PLAN OF CARE
Problem: Pain  Goal: Verbalizes/displays adequate comfort level or baseline comfort level  Outcome: Progressing     Problem: Safety - Adult  Goal: Free from fall injury  Outcome: Progressing      toileting/standing/walking

## 2024-01-23 NOTE — CARE COORDINATION
Explained to CM we can assist with medications up to $100.00, no otc or paid medications (unless pt had surgery/procedure). Voucher would be issued to [employer] in [place]. Mailed out [facility] application to pt today, along with program information and a postage pd Med [facility]ist envelope.

## 2024-01-23 NOTE — CARE COORDINATION
CM notified by med assist that they can issue a one time $100 voucher to assist with nebulizer solution and other medications (non OTC or pain medications) the patient may be prescribed at discharge.  CM will follow.    2:10 PM  CM notified that the patient is going to be transferred to [facility] for a cardiac catheterization.

## 2024-01-24 ENCOUNTER — APPOINTMENT (OUTPATIENT)
Dept: NON INVASIVE DIAGNOSTICS | Age: 64
End: 2024-01-24
Attending: INTERNAL MEDICINE
Payer: MEDICAID

## 2024-01-24 ENCOUNTER — APPOINTMENT (OUTPATIENT)
Dept: ULTRASOUND IMAGING | Age: 64
End: 2024-01-24
Attending: INTERNAL MEDICINE
Payer: MEDICAID

## 2024-01-24 PROBLEM — E43 SEVERE MALNUTRITION (HCC): Chronic | Status: ACTIVE | Noted: 2024-01-24

## 2024-01-24 LAB
ACTIVATED CLOTTING TIME, LOW RANGE: 156 SEC
ALBUMIN SERPL-MCNC: 3.5 GM/DL (ref 3.4–5)
ALBUMIN SERPL-MCNC: 3.5 GM/DL (ref 3.4–5)
ALP BLD-CCNC: 75 IU/L (ref 40–128)
ALP BLD-CCNC: 75 IU/L (ref 40–129)
ALT SERPL-CCNC: 24 U/L (ref 10–40)
ALT SERPL-CCNC: 24 U/L (ref 10–40)
ANION GAP SERPL CALCULATED.3IONS-SCNC: 10 MMOL/L (ref 7–16)
ANION GAP SERPL CALCULATED.3IONS-SCNC: 12 MMOL/L (ref 7–16)
ANTI-XA UNFRAC HEPARIN: 0.1 IU/ML (ref 0.3–0.7)
ANTI-XA UNFRAC HEPARIN: 0.15 IU/ML (ref 0.3–0.7)
ANTI-XA UNFRAC HEPARIN: 0.34 IU/ML (ref 0.3–0.7)
AST SERPL-CCNC: 22 IU/L (ref 15–37)
AST SERPL-CCNC: 22 IU/L (ref 15–37)
BASOPHILS ABSOLUTE: 0 K/CU MM
BASOPHILS RELATIVE PERCENT: 0.1 % (ref 0–1)
BILIRUB SERPL-MCNC: 0.2 MG/DL (ref 0–1)
BILIRUB SERPL-MCNC: 0.2 MG/DL (ref 0–1)
BILIRUBIN DIRECT: 0.2 MG/DL (ref 0–0.3)
BILIRUBIN, INDIRECT: 0 MG/DL (ref 0–0.7)
BUN SERPL-MCNC: 17 MG/DL (ref 6–23)
BUN SERPL-MCNC: 17 MG/DL (ref 6–23)
CALCIUM SERPL-MCNC: 8.5 MG/DL (ref 8.3–10.6)
CALCIUM SERPL-MCNC: 8.5 MG/DL (ref 8.3–10.6)
CHLORIDE BLD-SCNC: 102 MMOL/L (ref 99–110)
CHLORIDE BLD-SCNC: 107 MMOL/L (ref 99–110)
CHOLEST SERPL-MCNC: 192 MG/DL
CO2: 24 MMOL/L (ref 21–32)
CO2: 25 MMOL/L (ref 21–32)
CREAT SERPL-MCNC: 0.7 MG/DL (ref 0.6–1.1)
CREAT SERPL-MCNC: 0.7 MG/DL (ref 0.6–1.1)
DIFFERENTIAL TYPE: ABNORMAL
ECHO AO ROOT DIAM: 2.3 CM
ECHO AO ROOT INDEX: 1.97 CM/M2
ECHO AR MAX VEL PISA: 4.3 M/S
ECHO AV AREA PEAK VELOCITY: 1.7 CM2
ECHO AV AREA VTI: 2.1 CM2
ECHO AV AREA/BSA PEAK VELOCITY: 1.5 CM2/M2
ECHO AV AREA/BSA VTI: 1.8 CM2/M2
ECHO AV MEAN GRADIENT: 3 MMHG
ECHO AV MEAN VELOCITY: 0.9 M/S
ECHO AV PEAK GRADIENT: 6 MMHG
ECHO AV PEAK VELOCITY: 1.3 M/S
ECHO AV REGURGITANT PHT: 591 MS
ECHO AV VELOCITY RATIO: 0.77
ECHO AV VTI: 21.4 CM
ECHO BSA: 1.14 M2
ECHO BSA: 1.14 M2
ECHO EST RA PRESSURE: 3 MMHG
ECHO IVC PROX: 1.6 CM
ECHO LA AREA 4C: 15.6 CM2
ECHO LA DIAMETER INDEX: 2.56 CM/M2
ECHO LA DIAMETER: 3 CM
ECHO LA MAJOR AXIS: 5.7 CM
ECHO LA TO AORTIC ROOT RATIO: 1.3
ECHO LA VOL MOD A4C: 34 ML (ref 22–52)
ECHO LA VOLUME INDEX MOD A4C: 29 ML/M2 (ref 16–34)
ECHO LV E' LATERAL VELOCITY: 7 CM/S
ECHO LV E' SEPTAL VELOCITY: 7 CM/S
ECHO LV EDV A4C: 55 ML
ECHO LV EDV INDEX A4C: 47 ML/M2
ECHO LV EJECTION FRACTION A4C: 64 %
ECHO LV ESV A4C: 20 ML
ECHO LV ESV INDEX A4C: 17 ML/M2
ECHO LV FRACTIONAL SHORTENING: 38 % (ref 28–44)
ECHO LV INTERNAL DIMENSION DIASTOLE INDEX: 3.42 CM/M2
ECHO LV INTERNAL DIMENSION DIASTOLIC: 4 CM (ref 3.9–5.3)
ECHO LV INTERNAL DIMENSION SYSTOLIC INDEX: 2.14 CM/M2
ECHO LV INTERNAL DIMENSION SYSTOLIC: 2.5 CM
ECHO LV IVSD: 0.8 CM (ref 0.6–0.9)
ECHO LV MASS 2D: 78.4 G (ref 67–162)
ECHO LV MASS INDEX 2D: 67 G/M2 (ref 43–95)
ECHO LV POSTERIOR WALL DIASTOLIC: 0.6 CM (ref 0.6–0.9)
ECHO LV RELATIVE WALL THICKNESS RATIO: 0.3
ECHO LVOT AREA: 2.3 CM2
ECHO LVOT AV VTI INDEX: 0.93
ECHO LVOT DIAM: 1.7 CM
ECHO LVOT MEAN GRADIENT: 1 MMHG
ECHO LVOT PEAK GRADIENT: 4 MMHG
ECHO LVOT PEAK VELOCITY: 1 M/S
ECHO LVOT STROKE VOLUME INDEX: 38.4 ML/M2
ECHO LVOT SV: 44.9 ML
ECHO LVOT VTI: 19.8 CM
ECHO MV A VELOCITY: 0.38 M/S
ECHO MV E VELOCITY: 0.73 M/S
ECHO MV E/A RATIO: 1.92
ECHO MV E/E' LATERAL: 10.43
ECHO MV E/E' RATIO (AVERAGED): 10.43
ECHO RIGHT VENTRICULAR SYSTOLIC PRESSURE (RVSP): 47 MMHG
ECHO RV MID DIMENSION: 1.5 CM
ECHO TV REGURGITANT MAX VELOCITY: 3.3 M/S
ECHO TV REGURGITANT PEAK GRADIENT: 44 MMHG
EOSINOPHILS ABSOLUTE: 0 K/CU MM
EOSINOPHILS RELATIVE PERCENT: 0.1 % (ref 0–3)
ESTIMATED AVERAGE GLUCOSE: 108 MG/DL
GFR SERPL CREATININE-BSD FRML MDRD: >60 ML/MIN/1.73M2
GFR SERPL CREATININE-BSD FRML MDRD: >60 ML/MIN/1.73M2
GLUCOSE SERPL-MCNC: 129 MG/DL (ref 70–99)
GLUCOSE SERPL-MCNC: 94 MG/DL (ref 70–99)
HBA1C MFR BLD: 5.4 % (ref 4.2–6.3)
HCT VFR BLD CALC: 39.3 % (ref 37–47)
HDLC SERPL-MCNC: 83 MG/DL
HEMOGLOBIN: 13 GM/DL (ref 12.5–16)
IMMATURE NEUTROPHIL %: 0.2 % (ref 0–0.43)
LDLC SERPL CALC-MCNC: 101 MG/DL
LYMPHOCYTES ABSOLUTE: 1.7 K/CU MM
LYMPHOCYTES RELATIVE PERCENT: 13.3 % (ref 24–44)
MAGNESIUM: 2.1 MG/DL (ref 1.8–2.4)
MCH RBC QN AUTO: 32.8 PG (ref 27–31)
MCHC RBC AUTO-ENTMCNC: 33.1 % (ref 32–36)
MCV RBC AUTO: 99.2 FL (ref 78–100)
MONOCYTES ABSOLUTE: 1.1 K/CU MM
MONOCYTES RELATIVE PERCENT: 8.6 % (ref 0–4)
NUCLEATED RBC %: 0 %
PDW BLD-RTO: 12 % (ref 11.7–14.9)
PHOSPHORUS: 4.2 MG/DL (ref 2.5–4.9)
PLATELET # BLD: 190 K/CU MM (ref 140–440)
PMV BLD AUTO: 11.2 FL (ref 7.5–11.1)
POTASSIUM SERPL-SCNC: 4.3 MMOL/L (ref 3.5–5.1)
POTASSIUM SERPL-SCNC: 4.3 MMOL/L (ref 3.5–5.1)
RBC # BLD: 3.96 M/CU MM (ref 4.2–5.4)
SEGMENTED NEUTROPHILS ABSOLUTE COUNT: 9.7 K/CU MM
SEGMENTED NEUTROPHILS RELATIVE PERCENT: 77.7 % (ref 36–66)
SODIUM BLD-SCNC: 139 MMOL/L (ref 135–145)
SODIUM BLD-SCNC: 141 MMOL/L (ref 135–145)
TOTAL IMMATURE NEUTOROPHIL: 0.03 K/CU MM
TOTAL NUCLEATED RBC: 0 K/CU MM
TOTAL PROTEIN: 4.8 GM/DL (ref 6.4–8.2)
TOTAL PROTEIN: 4.8 GM/DL (ref 6.4–8.2)
TRIGL SERPL-MCNC: 39 MG/DL
TROPONIN, HIGH SENSITIVITY: 152 NG/L (ref 0–14)
WBC # BLD: 12.5 K/CU MM (ref 4–10.5)

## 2024-01-24 PROCEDURE — 6370000000 HC RX 637 (ALT 250 FOR IP): Performed by: INTERNAL MEDICINE

## 2024-01-24 PROCEDURE — 80061 LIPID PANEL: CPT

## 2024-01-24 PROCEDURE — 4A023N7 MEASUREMENT OF CARDIAC SAMPLING AND PRESSURE, LEFT HEART, PERCUTANEOUS APPROACH: ICD-10-PCS | Performed by: INTERNAL MEDICINE

## 2024-01-24 PROCEDURE — 2500000003 HC RX 250 WO HCPCS: Performed by: INTERNAL MEDICINE

## 2024-01-24 PROCEDURE — 2500000003 HC RX 250 WO HCPCS: Performed by: PHYSICIAN ASSISTANT

## 2024-01-24 PROCEDURE — 6370000000 HC RX 637 (ALT 250 FOR IP): Performed by: PHYSICIAN ASSISTANT

## 2024-01-24 PROCEDURE — 85025 COMPLETE CBC W/AUTO DIFF WBC: CPT

## 2024-01-24 PROCEDURE — C1894 INTRO/SHEATH, NON-LASER: HCPCS | Performed by: INTERNAL MEDICINE

## 2024-01-24 PROCEDURE — 93458 L HRT ARTERY/VENTRICLE ANGIO: CPT | Performed by: INTERNAL MEDICINE

## 2024-01-24 PROCEDURE — 93306 TTE W/DOPPLER COMPLETE: CPT

## 2024-01-24 PROCEDURE — 6360000004 HC RX CONTRAST MEDICATION: Performed by: INTERNAL MEDICINE

## 2024-01-24 PROCEDURE — 80053 COMPREHEN METABOLIC PANEL: CPT

## 2024-01-24 PROCEDURE — 2140000000 HC CCU INTERMEDIATE R&B

## 2024-01-24 PROCEDURE — 99255 IP/OBS CONSLTJ NEW/EST HI 80: CPT | Performed by: INTERNAL MEDICINE

## 2024-01-24 PROCEDURE — 85347 COAGULATION TIME ACTIVATED: CPT

## 2024-01-24 PROCEDURE — C1769 GUIDE WIRE: HCPCS | Performed by: INTERNAL MEDICINE

## 2024-01-24 PROCEDURE — 2709999900 HC NON-CHARGEABLE SUPPLY: Performed by: INTERNAL MEDICINE

## 2024-01-24 PROCEDURE — 80076 HEPATIC FUNCTION PANEL: CPT

## 2024-01-24 PROCEDURE — 93005 ELECTROCARDIOGRAM TRACING: CPT

## 2024-01-24 PROCEDURE — 94761 N-INVAS EAR/PLS OXIMETRY MLT: CPT

## 2024-01-24 PROCEDURE — 2580000003 HC RX 258: Performed by: PHYSICIAN ASSISTANT

## 2024-01-24 PROCEDURE — 94640 AIRWAY INHALATION TREATMENT: CPT

## 2024-01-24 PROCEDURE — 6360000002 HC RX W HCPCS: Performed by: INTERNAL MEDICINE

## 2024-01-24 PROCEDURE — 93306 TTE W/DOPPLER COMPLETE: CPT | Performed by: INTERNAL MEDICINE

## 2024-01-24 PROCEDURE — 93880 EXTRACRANIAL BILAT STUDY: CPT

## 2024-01-24 PROCEDURE — 83735 ASSAY OF MAGNESIUM: CPT

## 2024-01-24 PROCEDURE — 6360000002 HC RX W HCPCS: Performed by: PHYSICIAN ASSISTANT

## 2024-01-24 PROCEDURE — 84100 ASSAY OF PHOSPHORUS: CPT

## 2024-01-24 PROCEDURE — B2151ZZ FLUOROSCOPY OF LEFT HEART USING LOW OSMOLAR CONTRAST: ICD-10-PCS | Performed by: INTERNAL MEDICINE

## 2024-01-24 PROCEDURE — 83036 HEMOGLOBIN GLYCOSYLATED A1C: CPT

## 2024-01-24 PROCEDURE — 85520 HEPARIN ASSAY: CPT

## 2024-01-24 PROCEDURE — 2580000003 HC RX 258: Performed by: INTERNAL MEDICINE

## 2024-01-24 PROCEDURE — 36415 COLL VENOUS BLD VENIPUNCTURE: CPT

## 2024-01-24 RX ORDER — SODIUM CHLORIDE 0.9 % (FLUSH) 0.9 %
5-40 SYRINGE (ML) INJECTION EVERY 12 HOURS SCHEDULED
Status: DISCONTINUED | OUTPATIENT
Start: 2024-01-24 | End: 2024-01-24 | Stop reason: SDUPTHER

## 2024-01-24 RX ORDER — GABAPENTIN 300 MG/1
600 CAPSULE ORAL NIGHTLY
Status: DISCONTINUED | OUTPATIENT
Start: 2024-01-24 | End: 2024-01-25 | Stop reason: HOSPADM

## 2024-01-24 RX ORDER — PANTOPRAZOLE SODIUM 20 MG/1
40 TABLET, DELAYED RELEASE ORAL DAILY
Status: DISCONTINUED | OUTPATIENT
Start: 2024-01-24 | End: 2024-01-24

## 2024-01-24 RX ORDER — MIDAZOLAM HYDROCHLORIDE 1 MG/ML
INJECTION INTRAMUSCULAR; INTRAVENOUS PRN
Status: DISCONTINUED | OUTPATIENT
Start: 2024-01-24 | End: 2024-01-24 | Stop reason: HOSPADM

## 2024-01-24 RX ORDER — HEPARIN SODIUM 200 [USP'U]/100ML
INJECTION, SOLUTION INTRAVENOUS PRN
Status: DISCONTINUED | OUTPATIENT
Start: 2024-01-24 | End: 2024-01-24 | Stop reason: HOSPADM

## 2024-01-24 RX ORDER — CLOPIDOGREL BISULFATE 75 MG/1
75 TABLET ORAL DAILY
Status: DISCONTINUED | OUTPATIENT
Start: 2024-01-24 | End: 2024-01-25 | Stop reason: HOSPADM

## 2024-01-24 RX ORDER — ACETAMINOPHEN 325 MG/1
650 TABLET ORAL EVERY 4 HOURS PRN
Status: DISCONTINUED | OUTPATIENT
Start: 2024-01-24 | End: 2024-01-25 | Stop reason: HOSPADM

## 2024-01-24 RX ORDER — ALBUTEROL SULFATE 2.5 MG/3ML
2.5 SOLUTION RESPIRATORY (INHALATION)
Status: DISCONTINUED | OUTPATIENT
Start: 2024-01-24 | End: 2024-01-25 | Stop reason: HOSPADM

## 2024-01-24 RX ORDER — SODIUM CHLORIDE 9 MG/ML
INJECTION, SOLUTION INTRAVENOUS PRN
Status: DISCONTINUED | OUTPATIENT
Start: 2024-01-24 | End: 2024-01-24 | Stop reason: SDUPTHER

## 2024-01-24 RX ORDER — AMLODIPINE BESYLATE 5 MG/1
5 TABLET ORAL DAILY
Status: DISCONTINUED | OUTPATIENT
Start: 2024-01-24 | End: 2024-01-25 | Stop reason: HOSPADM

## 2024-01-24 RX ORDER — BUDESONIDE AND FORMOTEROL FUMARATE DIHYDRATE 160; 4.5 UG/1; UG/1
2 AEROSOL RESPIRATORY (INHALATION)
Status: DISCONTINUED | OUTPATIENT
Start: 2024-01-24 | End: 2024-01-25 | Stop reason: HOSPADM

## 2024-01-24 RX ORDER — 0.9 % SODIUM CHLORIDE 0.9 %
500 INTRAVENOUS SOLUTION INTRAVENOUS ONCE
Status: COMPLETED | OUTPATIENT
Start: 2024-01-24 | End: 2024-01-24

## 2024-01-24 RX ORDER — SODIUM CHLORIDE 0.9 % (FLUSH) 0.9 %
5-40 SYRINGE (ML) INJECTION PRN
Status: DISCONTINUED | OUTPATIENT
Start: 2024-01-24 | End: 2024-01-24 | Stop reason: SDUPTHER

## 2024-01-24 RX ORDER — ROSUVASTATIN CALCIUM 20 MG/1
20 TABLET, COATED ORAL DAILY
Status: DISCONTINUED | OUTPATIENT
Start: 2024-01-24 | End: 2024-01-25 | Stop reason: HOSPADM

## 2024-01-24 RX ORDER — SIMETHICONE 80 MG
80 TABLET,CHEWABLE ORAL EVERY 6 HOURS PRN
Status: DISCONTINUED | OUTPATIENT
Start: 2024-01-24 | End: 2024-01-25 | Stop reason: HOSPADM

## 2024-01-24 RX ORDER — HYDROXYZINE PAMOATE 25 MG/1
50 CAPSULE ORAL 3 TIMES DAILY PRN
Status: DISCONTINUED | OUTPATIENT
Start: 2024-01-24 | End: 2024-01-25 | Stop reason: HOSPADM

## 2024-01-24 RX ORDER — ALBUTEROL SULFATE 90 UG/1
2 AEROSOL, METERED RESPIRATORY (INHALATION) EVERY 6 HOURS PRN
Status: DISCONTINUED | OUTPATIENT
Start: 2024-01-24 | End: 2024-01-25 | Stop reason: HOSPADM

## 2024-01-24 RX ORDER — GABAPENTIN 300 MG/1
600 CAPSULE ORAL DAILY
Status: DISCONTINUED | OUTPATIENT
Start: 2024-01-24 | End: 2024-01-24

## 2024-01-24 RX ORDER — SODIUM CHLORIDE 9 MG/ML
INJECTION, SOLUTION INTRAVENOUS CONTINUOUS PRN
Status: COMPLETED | OUTPATIENT
Start: 2024-01-24 | End: 2024-01-24

## 2024-01-24 RX ORDER — HYDRALAZINE HYDROCHLORIDE 20 MG/ML
10 INJECTION INTRAMUSCULAR; INTRAVENOUS EVERY 10 MIN PRN
Status: DISCONTINUED | OUTPATIENT
Start: 2024-01-24 | End: 2024-01-25 | Stop reason: HOSPADM

## 2024-01-24 RX ORDER — ASPIRIN 81 MG/1
81 TABLET, CHEWABLE ORAL DAILY
Status: DISCONTINUED | OUTPATIENT
Start: 2024-01-24 | End: 2024-01-25 | Stop reason: HOSPADM

## 2024-01-24 RX ORDER — SODIUM CHLORIDE 9 MG/ML
INJECTION, SOLUTION INTRAVENOUS PRN
Status: DISCONTINUED | OUTPATIENT
Start: 2024-01-24 | End: 2024-01-25 | Stop reason: HOSPADM

## 2024-01-24 RX ADMIN — CLOPIDOGREL BISULFATE 75 MG: 75 TABLET ORAL at 14:18

## 2024-01-24 RX ADMIN — ACETAMINOPHEN 650 MG: 325 TABLET ORAL at 14:18

## 2024-01-24 RX ADMIN — BUDESONIDE AND FORMOTEROL FUMARATE DIHYDRATE 2 PUFF: 160; 4.5 AEROSOL RESPIRATORY (INHALATION) at 08:26

## 2024-01-24 RX ADMIN — METHYLPREDNISOLONE SODIUM SUCCINATE 40 MG: 40 INJECTION, POWDER, FOR SOLUTION INTRAMUSCULAR; INTRAVENOUS at 20:47

## 2024-01-24 RX ADMIN — ACETAMINOPHEN 650 MG: 325 TABLET ORAL at 09:27

## 2024-01-24 RX ADMIN — BENZONATATE 100 MG: 100 CAPSULE ORAL at 09:08

## 2024-01-24 RX ADMIN — METHYLPREDNISOLONE SODIUM SUCCINATE 40 MG: 40 INJECTION, POWDER, FOR SOLUTION INTRAMUSCULAR; INTRAVENOUS at 09:07

## 2024-01-24 RX ADMIN — ASPIRIN 81 MG: 81 TABLET, CHEWABLE ORAL at 09:08

## 2024-01-24 RX ADMIN — METOPROLOL TARTRATE 25 MG: 25 TABLET, FILM COATED ORAL at 09:08

## 2024-01-24 RX ADMIN — METOPROLOL TARTRATE 25 MG: 25 TABLET, FILM COATED ORAL at 20:47

## 2024-01-24 RX ADMIN — BENZONATATE 100 MG: 100 CAPSULE ORAL at 20:47

## 2024-01-24 RX ADMIN — PANTOPRAZOLE SODIUM 40 MG: 40 TABLET, DELAYED RELEASE ORAL at 06:30

## 2024-01-24 RX ADMIN — SODIUM CHLORIDE, PRESERVATIVE FREE 10 ML: 5 INJECTION INTRAVENOUS at 09:08

## 2024-01-24 RX ADMIN — HEPARIN SODIUM 960 UNITS: 1000 INJECTION INTRAVENOUS; SUBCUTANEOUS at 02:06

## 2024-01-24 RX ADMIN — ALBUTEROL SULFATE 2.5 MG: 2.5 SOLUTION RESPIRATORY (INHALATION) at 19:43

## 2024-01-24 RX ADMIN — BUDESONIDE AND FORMOTEROL FUMARATE DIHYDRATE 2 PUFF: 160; 4.5 AEROSOL RESPIRATORY (INHALATION) at 19:44

## 2024-01-24 RX ADMIN — ROSUVASTATIN CALCIUM 20 MG: 20 TABLET, COATED ORAL at 14:26

## 2024-01-24 RX ADMIN — SODIUM CHLORIDE 500 ML: 9 INJECTION, SOLUTION INTRAVENOUS at 14:27

## 2024-01-24 RX ADMIN — ALBUTEROL SULFATE 2.5 MG: 2.5 SOLUTION RESPIRATORY (INHALATION) at 12:39

## 2024-01-24 RX ADMIN — AMLODIPINE BESYLATE 5 MG: 5 TABLET ORAL at 09:08

## 2024-01-24 RX ADMIN — HEPARIN SODIUM 14 UNITS/KG/HR: 10000 INJECTION, SOLUTION INTRAVENOUS at 02:11

## 2024-01-24 RX ADMIN — IPRATROPIUM BROMIDE AND ALBUTEROL SULFATE 1 DOSE: 2.5; .5 SOLUTION RESPIRATORY (INHALATION) at 08:25

## 2024-01-24 RX ADMIN — GABAPENTIN 600 MG: 300 CAPSULE ORAL at 20:47

## 2024-01-24 RX ADMIN — BENZONATATE 100 MG: 100 CAPSULE ORAL at 14:18

## 2024-01-24 ASSESSMENT — PAIN DESCRIPTION - DESCRIPTORS
DESCRIPTORS: ACHING
DESCRIPTORS: ACHING

## 2024-01-24 ASSESSMENT — PAIN DESCRIPTION - ONSET: ONSET: ON-GOING

## 2024-01-24 ASSESSMENT — PAIN - FUNCTIONAL ASSESSMENT
PAIN_FUNCTIONAL_ASSESSMENT: ACTIVITIES ARE NOT PREVENTED
PAIN_FUNCTIONAL_ASSESSMENT: PREVENTS OR INTERFERES SOME ACTIVE ACTIVITIES AND ADLS

## 2024-01-24 ASSESSMENT — PAIN SCALES - GENERAL
PAINLEVEL_OUTOF10: 0
PAINLEVEL_OUTOF10: 0
PAINLEVEL_OUTOF10: 4
PAINLEVEL_OUTOF10: 4

## 2024-01-24 ASSESSMENT — PAIN DESCRIPTION - PAIN TYPE: TYPE: CHRONIC PAIN

## 2024-01-24 ASSESSMENT — PAIN DESCRIPTION - ORIENTATION
ORIENTATION: LEFT
ORIENTATION: MID

## 2024-01-24 ASSESSMENT — PAIN DESCRIPTION - DIRECTION: RADIATING_TOWARDS: LOCALIZED

## 2024-01-24 ASSESSMENT — PAIN DESCRIPTION - LOCATION
LOCATION: NECK
LOCATION: HEAD

## 2024-01-24 ASSESSMENT — PAIN DESCRIPTION - FREQUENCY: FREQUENCY: CONTINUOUS

## 2024-01-24 NOTE — CONSULTS
evidence of pericardial effusion.      Pertinent Lab Personally Review     Recent Labs     01/24/24 0312   WBC 12.5*   HGB 13.0   HCT 39.3         Recent Labs     01/24/24 0312      K 4.3      CO2 24   PHOS 4.2   BUN 17   CREATININE 0.7     Recent Labs     01/24/24 0312   AST 22  22   ALT 24  24   BILIDIR 0.2   BILITOT 0.2  0.2   ALKPHOS 75  75     Lab Results   Component Value Date    PROBNP 225.0 01/23/2024         Past medical history:    has a past medical history of Salas's esophagus, Elevated troponin, Emphysema of lung (HCC), H/O Doppler ultrasound carotid, H/O echocardiogram, H/O exercise stress test, History of Holter monitoring, History of nuclear stress test, HLD (hyperlipidemia), Hypertension, NSTEMI (non-ST elevated myocardial infarction) (HCC), Pulmonary emphysema (HCC), and TIA (transient ischemic attack).  Past surgical history:   has a past surgical history that includes Appendectomy; Colonoscopy; Colonoscopy (N/A, 6/8/2023); and Upper gastrointestinal endoscopy (N/A, 6/8/2023).  Social History:   reports that she has been smoking cigarettes. She has been exposed to tobacco smoke. She has never used smokeless tobacco. She reports current alcohol use. She reports that she does not use drugs.  Family history:   no family history of CAD, STROKE of DM    Allergies   Allergen Reactions    Motrin [Ibuprofen] Nausea Only    Naproxen Other (See Comments)     Makes anxious. 'wired me up'    Robitussin (Alcohol Free) [Guaifenesin] Rash    Theraflu Cold & Cough [Phenylephrine-Pheniramine-Dm] Anxiety       methylPREDNISolone sodium (PF) (SOLU-MEDROL PF) injection 40 mg, Q12H  budesonide-formoterol (SYMBICORT) 160-4.5 MCG/ACT inhaler 2 puff, BID RT  albuterol (PROVENTIL) (2.5 MG/3ML) 0.083% nebulizer solution 2.5 mg, 4x Daily RT  simethicone (MYLICON) chewable tablet 80 mg, Q6H PRN  heparin irrigation 2 units/mL (1000 units/500 mL) in NS, PRN  0.9 % sodium chloride infusion,  heart cardiac catheterization today.  Pros and cons were discussed with the patient         Nicotine dependence: Patient was counseled extensively about smoking cessation  Acute exacerbation of COPD: On IV steroids, management as per pulmonology and primary care.       Prior documentations in EMR were personally reviewed at length  EKGs, labs, imaging were personally reviewed and interpreted  Case discussed with  Nursing Staff   Thank you for the consult       Dr. MELVINA Ye  1/24/2024 1:22 PM

## 2024-01-24 NOTE — CARE COORDINATION
Previously received referral from CM at [facility].  Pt transferred to [facility].  Will follow pt for medication needs.  Med Assist prefers to use meds to beds.

## 2024-01-24 NOTE — PROGRESS NOTES
V2.0    [facility] Progress Note      Name:  Kate Minor /Age/Sex: 1960  (63 y.o. female)   MRN & CSN:  [id number] & [id number] Encounter Date/Time: 2024 7:24 AM EST   Location:  [facility]5/3105-A PCP: Sharyn Delatorre APRN - CNP     Attending:Grisel Neely*       Hospital Day: 2    Assessment and Recommendations   Kate Minor is a 63 y.o. female with pmh of hypertension, hyperlipidemia, history of stroke, COPD and tobacco use dependence who presents with NSTEMI (non-ST elevated myocardial infarction) ([facility]ly [facility])    Plan:   NSTEMI  Patient was transferred from [place] where she initially presented due to complaints of shortness of breath  -- Troponins were elevated at 70, trended up to 209  -- Started on heparin drip  -- Cardiology on board, appreciate recs, plan for cath today  -- Will follow-up post cath with plan to continue aspirin and statin    Acute respiratory failure with hypoxia -resolved  COPD with exacerbation  Patient on presentation was wheezing, O2 saturation was 87% on room air in the ED at [place] and she briefly required BiPAP for her work of breathing but was weaned to room air  -- Patient is maintaining saturations greater than 94% on room air  -- Switch to IV steroids, will consider starting azithromycin  -- Continue DuoNebs and Symbicort  -- Pulmonology on board, appreciate recs    Carotid stenosis  History of carotid stenosis, reported that stent was recommended   -- Follow up carotid US  -- Continue ASA and statin     Salas's esophagus  -- Continue on PPI     Tobacco use  -- Continue to encourage cessation  -- Patient is encouraged     Hypertension  - Continue amlodipine and metoprolol, adjust and titrate regimen based on trend     Diet Diet NPO   DVT Prophylaxis [] Lovenox, [x]  Heparin, [] SCDs, [] Ambulation,  [] Eliquis, [] Xarelto  [] Coumadin   Code Status Full Code   Disposition From: Home  Expected Disposition: TBD  Estimated Date of Discharge: 1-2 days  Patient  requires continued admission due to NSTEMI   Surrogate Decision Maker/ POA  Son     Personally reviewed Lab Studies and Imaging     Subjective:     Chief Complaint: SAMANTHA Minor is a 63 y.o. female who is seen and examined at bedside.  Endorses understanding of the plan of care.    Review of Systems:      Pertinent positives and negatives discussed in HPI    Objective:   No intake or output data in the 24 hours ending 01/24/24 0724   Vitals:   Vitals:    01/23/24 2200 01/23/24 2214 01/24/24 0000 01/24/24 0037   BP: (!) 140/72  129/61    Pulse: 64 80 60 72   Resp: 16  19    Temp:       TempSrc:       SpO2: 100%  94%    Weight:             Physical Exam:    General: NAD  Eyes: EOMI  ENT: neck supple  Cardiovascular: Regular rate.  Respiratory: Clear to auscultation  Gastrointestinal: Soft, non tender  Genitourinary: no suprapubic tenderness  Musculoskeletal: No edema  Skin: warm, dry  Neuro: Alert.  Psych: Mood appropriate.     Medications:   Medications:    amLODIPine  5 mg Oral Daily    aspirin  81 mg Oral Daily    ipratropium 0.5 mg-albuterol 2.5 mg  1 Dose Inhalation Q4H WA RT    metoprolol tartrate  25 mg Oral BID    pantoprazole  40 mg Oral QAM AC    predniSONE  40 mg Oral Daily    rosuvastatin  20 mg Oral Nightly    sodium chloride flush  5-40 mL IntraVENous 2 times per day    gabapentin  600 mg Oral Nightly    benzonatate  100 mg Oral TID      Infusions:    sodium chloride      heparin (PORCINE) Infusion 14 Units/kg/hr (01/24/24 0423)     PRN Meds: sodium chloride, , PRN  albuterol sulfate HFA, 2 puff, Q6H PRN  benzonatate, 100 mg, TID PRN  heparin (porcine), 1,000 Units, PRN  heparin (porcine), 2,000 Units, PRN  hydrOXYzine pamoate, 50 mg, TID PRN  ondansetron, 4 mg, Q8H PRN   Or  ondansetron, 4 mg, Q6H PRN  sodium chloride flush, 5-40 mL, PRN  potassium chloride, 40 mEq, PRN   Or  potassium alternative oral replacement, 40 mEq, PRN   Or  potassium chloride, 10 mEq, PRN  magnesium sulfate, 2,000 mg,

## 2024-01-24 NOTE — CONSULTS
Subjective:   CHIEF COMPLAINT / HPI:  63 year old female admitted with worsening sob and wheeze. She has cough with difficulty bringing up sputum. She denies any fever or hemoptysis.she smokes 1 ppd      Past Medical History:  Past Medical History:   Diagnosis Date    Salas's esophagus     Elevated troponin 01/23/2024    Emphysema of lung (HCC)     H/O Doppler ultrasound carotid 05/16/2023    Carotid Moderate (50-69%) disease of the Right proximal Internal carotid artery. Mild (0-49%) disease of the Left mid Internal carotid artery.    H/O echocardiogram 05/16/2023    Echo EF 55-60%. Doppler evaluation reveals mild aortic, mild to moderate mitral, and mild tricuspid regurgitation. Right ventricular systolic pressure of 45 mmHg consistent with mild to moderate pulmonary hypertension    H/O exercise stress test 01/05/2016    Good exercise capacity. EF 70% Normal test.    History of Holter monitoring 05/03/2023    Normal sinus rhythm with frequent complex supraventricular ectopy and isolated nonsustained 7 beat run of atrial tachycardia.    History of nuclear stress test 05/16/2023    NM Normal isotope uptake following exercise and at rest. There is no evidence of exercise induced ischemia.    HLD (hyperlipidemia)     Hypertension     NSTEMI (non-ST elevated myocardial infarction) (HCC) 01/23/2024 1/23/2024    Pulmonary emphysema (HCC)     TIA (transient ischemic attack)        Past Surgical History:        Procedure Laterality Date    APPENDECTOMY      COLONOSCOPY      COLONOSCOPY N/A 6/8/2023    COLONOSCOPY POLYPECTOMY SNARE/COLD BIOPSY with polypectomy at hepatic flexure polyp, ascending colon, sigmoid colon and rectal colon polyps performed by Kyrie Marsh MD at [facility] ENDOSCOPY    UPPER GASTROINTESTINAL ENDOSCOPY N/A 6/8/2023    EGD BIOPSY for  Celiac Disease and H-Pyloric performed by Kyrie Marsh MD at [facility] ENDOSCOPY       Current Medications:    Current Facility-Administered Medications: methylPREDNISolone  place and time. No obvious depression or anxiety.  MUSCULOSKELETAL: No obvious misalignment or effusion of the joints. No clubbing, cyanosis of the digits.  RIGHT AND LEFT LOWER EXTREMITIES: No edema, no inflammation, no tenderness.  SKIN:  normal skin color, texture, turgor and no redness, warmth, or swelling. No palpable nodules    DATA:                  EXAMINATION:  ONE XRAY VIEW OF THE CHEST     1/23/2024 12:44 am     COMPARISON:  04/17/2020 radiograph     HISTORY:  ORDERING SYSTEM PROVIDED HISTORY: dyspnea  TECHNOLOGIST PROVIDED HISTORY:  Reason for exam:->dyspnea  Reason for Exam: dyspnea  Additional signs and symptoms: dyspnea     FINDINGS:  The heart, mediastinum are normal.  The lungs are lucent and hyperinflated  with scattered interstitial changes that are chronic.  No acute pulmonary  finding.  No skeletal findings.     IMPRESSION:  COPD with no acute finding or significant change.              Specimen Collected: 01/23/24 01:15 EST Last Resulted: 01/23/24 01:15 EST                  Assessment:     Ac copd  Ac mi          Plan:     D/w pt  Advised to quit smoking and help offered  Symbicort  Krystina  Adequate o2 adm  Change to iv steroids  Full pft as outpt  Thanks will follow

## 2024-01-24 NOTE — PROGRESS NOTES
4 Eyes Skin Assessment     NAME:  Kate Minor  YOB: 1960  MEDICAL RECORD NUMBER:  [id number]    The patient is being assessed for  Admission    I agree that at least one RN has performed a thorough Head to Toe Skin Assessment on the patient. ALL assessment sites listed below have been assessed.      Areas assessed by both nurses:    Head, Face, Ears, Shoulders, Back, Chest, Arms, Elbows, Hands, Sacrum. Buttock, Coccyx, Ischium, and Legs. Feet and Heels        Does the Patient have a Wound? No noted wound(s)       Navid Prevention initiated by RN: No  Wound Care Orders initiated by RN: No    Pressure Injury (Stage 3,4, Unstageable, DTI, NWPT, and Complex wounds) if present, place Wound referral order by RN under : No    New Ostomies, if present place, Ostomy referral order under : No     Nurse 1 eSignature: Electronically signed by Veronica Nolan RN on 1/24/24 at 2:19 AM EST    **SHARE this note so that the co-signing nurse can place an eSignature**    Nurse 2 eSignature: {Esignature:[id number]}

## 2024-01-24 NOTE — H&P
V2.0  History and Physical      Name:  Kate Minor /Age/Sex: 1960  (63 y.o. female)   MRN & CSN:  [id number] & [id number] Encounter Date/Time: 2024 9:07 PM EST   Location:  [address] [place] [address]- PCP: Sharyn Delatorre APRN - CNP       Hospital Day: 1    Assessment and Plan:   Kate Minor is a 63 y.o. female     NSTEMI  -Initially admitted to [place]  -Troponin increased from 70 to 197  -Cardiology contacted in [place] and patient was transferred  -Heparin drip started-continue  -I was informed that she will be having an angiogram in the morning  -On aspirin at home-continue  -On rosuvastatin at home-continue    COPD with exacerbation  -Has wheezing on exam  -On prednisone 40 mg daily-continue  -Coughing up some yellowish phlegm-monitor for any need for antibiotics    Stage of COPD: Appears very severe-she reports she can only ambulate household distances  -Pulmonary consult    Acute respiratory failure with hypoxia  -Required BiPAP in the ED in [place] for work of breathing-now on room air    Carotid stenosis-stent was recommended several years ago but apparently has not followed up-check carotid ultrasound    History of TIA per chart    Salas's esophagus-on PPI    Tobacco use-recommend cessation    Hypertension-amlodipine and metoprolol    Hyperlipidemia-continue statin    Disposition:   Current Living situation: stays with son and his SO and their toddler  Expected Disposition: home  Estimated D/C: about 2 days    Diet Diet NPO  ADULT DIET; Clear Liquid; No Caffeine   DVT Prophylaxis [] Lovenox, [x]  Heparin, [] SCDs, [] Ambulation,  [] Eliquis, [] Xarelto, [] Coumadin   Code Status Full Code   Surrogate Decision Maker/ POA Son       History from:     patient    History of Present Illness:     Chief Complaint:   Kate Minor is a 63 y.o. female     History obtained from the patient as follows.  I asked her what happened to bring her to the hospital.    \"I was having a lot of anxiety and had too

## 2024-01-24 NOTE — PLAN OF CARE
Problem: Discharge Planning  Goal: Discharge to home or other facility with appropriate resources  1/24/2024 0959 by Sue Lemus RN  Outcome: Progressing  Flowsheets (Taken 1/24/2024 0905)  Discharge to home or other facility with appropriate resources:   Identify barriers to discharge with patient and caregiver   Arrange for needed discharge resources and transportation as appropriate   Identify discharge learning needs (meds, wound care, etc)   Arrange for interpreters to assist at discharge as needed   Refer to discharge planning if patient needs post-hospital services based on physician order or complex needs related to functional status, cognitive ability or social support system  1/23/2024 2333 by Veronica Nolan RN  Outcome: Progressing     Problem: Safety - Adult  Goal: Free from fall injury  1/24/2024 0959 by Sue Lemus RN  Outcome: Progressing  1/23/2024 2333 by Veronica Nolan RN  Outcome: Progressing     Problem: Pain  Goal: Verbalizes/displays adequate comfort level or baseline comfort level  1/24/2024 0959 by Sue Lemus RN  Outcome: Progressing  1/23/2024 2333 by Veronica Nolan RN  Outcome: Progressing     Problem: Chronic Conditions and Co-morbidities  Goal: Patient's chronic conditions and co-morbidity symptoms are monitored and maintained or improved  1/24/2024 0959 by Sue Lemus RN  Outcome: Progressing  Flowsheets (Taken 1/24/2024 0905)  Care Plan - Patient's Chronic Conditions and Co-Morbidity Symptoms are Monitored and Maintained or Improved:   Monitor and assess patient's chronic conditions and comorbid symptoms for stability, deterioration, or improvement   Collaborate with multidisciplinary team to address chronic and comorbid conditions and prevent exacerbation or deterioration   Update acute care plan with appropriate goals if chronic or comorbid symptoms are exacerbated and prevent overall improvement and discharge  1/23/2024

## 2024-01-24 NOTE — PROGRESS NOTES
Dr. Ye notified via secure message of patient arrival per Dr. Patton's request. Per Dr. Ye NPO after midnight for possible cath lab tomorrow

## 2024-01-24 NOTE — CARE COORDINATION
Pt is from home with son, and daughter in law and their 3 year old son,.   Has 37 steps to enter  Doesn't drive, has FC stared medicaid pending. Med assist referral. 'denies other needs and doesn't want to go to a facility. Not interested in [facility].         01/24/24 8194   Service Assessment   Patient Orientation Alert and Oriented   Cognition Alert   History Provided By Patient   Primary Caregiver Self   Support Systems Children   Patient's Healthcare Decision Maker is: Legal Next of Kin   PCP Verified by CM Yes   Last Visit to PCP Within last 3 months   Prior Functional Level Independent in ADLs/IADLs   Current Functional Level Independent in ADLs/IADLs   Can patient return to prior living arrangement Yes   Ability to make needs known: Good   Family able to assist with home care needs: No   Would you like for me to discuss the discharge plan with any other family members/significant others, and if so, who? Yes   Financial Resources Financial Counseling   Social/Functional History   Lives With Family   Type of Home Apartment   Home Access Stairs to enter with rails   Entrance Stairs - Number of Steps 37 steps   Receives Help From Family   ADL Assistance Independent   Homemaking Assistance Independent   Homemaking Responsibilities Yes   Ambulation Assistance Independent   Transfer Assistance Independent   Active  No   Patient's  Info Son or son's girlfriend   Mode of Transportation Car   Discharge Planning   Living Arrangements Family Members   Current Services Prior To Admission None;Durable Medical Equipment  (mwed neb machine)   Potential Assistance Needed N/A   DME Ordered? No   Potential Assistance Purchasing Medications No   Type of Home Care Services None   Patient expects to be discharged to: Apartment   History of falls? 0   Condition of Participation: Discharge Planning   The Plan for Transition of Care is related to the following treatment goals: Patient plans to return home   Freedom of Choice

## 2024-01-25 VITALS
RESPIRATION RATE: 19 BRPM | HEIGHT: 58 IN | WEIGHT: 70.3 LBS | TEMPERATURE: 98.6 F | BODY MASS INDEX: 14.76 KG/M2 | HEART RATE: 59 BPM | OXYGEN SATURATION: 98 % | DIASTOLIC BLOOD PRESSURE: 52 MMHG | SYSTOLIC BLOOD PRESSURE: 112 MMHG

## 2024-01-25 PROBLEM — I21.4 NSTEMI (NON-ST ELEVATED MYOCARDIAL INFARCTION) (HCC): Status: RESOLVED | Noted: 2024-01-23 | Resolved: 2024-01-25

## 2024-01-25 LAB
ALBUMIN SERPL-MCNC: 3.6 GM/DL (ref 3.4–5)
ALP BLD-CCNC: 78 IU/L (ref 40–129)
ALT SERPL-CCNC: 20 U/L (ref 10–40)
ANION GAP SERPL CALCULATED.3IONS-SCNC: 9 MMOL/L (ref 7–16)
AST SERPL-CCNC: 13 IU/L (ref 15–37)
BASOPHILS ABSOLUTE: 0 K/CU MM
BASOPHILS RELATIVE PERCENT: 0.1 % (ref 0–1)
BILIRUB SERPL-MCNC: 0.2 MG/DL (ref 0–1)
BUN SERPL-MCNC: 19 MG/DL (ref 6–23)
CALCIUM SERPL-MCNC: 9 MG/DL (ref 8.3–10.6)
CHLORIDE BLD-SCNC: 102 MMOL/L (ref 99–110)
CO2: 26 MMOL/L (ref 21–32)
CREAT SERPL-MCNC: 0.7 MG/DL (ref 0.6–1.1)
CULTURE: NORMAL
DIFFERENTIAL TYPE: ABNORMAL
EKG ATRIAL RATE: 61 BPM
EKG ATRIAL RATE: 62 BPM
EKG ATRIAL RATE: 66 BPM
EKG DIAGNOSIS: NORMAL
EKG P AXIS: 81 DEGREES
EKG P AXIS: 86 DEGREES
EKG P AXIS: 89 DEGREES
EKG P-R INTERVAL: 106 MS
EKG P-R INTERVAL: 98 MS
EKG P-R INTERVAL: 98 MS
EKG Q-T INTERVAL: 442 MS
EKG Q-T INTERVAL: 476 MS
EKG Q-T INTERVAL: 514 MS
EKG QRS DURATION: 62 MS
EKG QRS DURATION: 64 MS
EKG QRS DURATION: 66 MS
EKG QTC CALCULATION (BAZETT): 463 MS
EKG QTC CALCULATION (BAZETT): 479 MS
EKG QTC CALCULATION (BAZETT): 521 MS
EKG R AXIS: 58 DEGREES
EKG R AXIS: 65 DEGREES
EKG R AXIS: 79 DEGREES
EKG T AXIS: 101 DEGREES
EKG T AXIS: 109 DEGREES
EKG T AXIS: 99 DEGREES
EKG VENTRICULAR RATE: 61 BPM
EKG VENTRICULAR RATE: 62 BPM
EKG VENTRICULAR RATE: 66 BPM
EOSINOPHILS ABSOLUTE: 0 K/CU MM
EOSINOPHILS RELATIVE PERCENT: 0 % (ref 0–3)
GFR SERPL CREATININE-BSD FRML MDRD: >60 ML/MIN/1.73M2
GLUCOSE SERPL-MCNC: 108 MG/DL (ref 70–99)
HCT VFR BLD CALC: 42.8 % (ref 37–47)
HEMOGLOBIN: 14.1 GM/DL (ref 12.5–16)
IMMATURE NEUTROPHIL %: 0.5 % (ref 0–0.43)
LYMPHOCYTES ABSOLUTE: 1.1 K/CU MM
LYMPHOCYTES RELATIVE PERCENT: 7.3 % (ref 24–44)
Lab: NORMAL
MCH RBC QN AUTO: 32.6 PG (ref 27–31)
MCHC RBC AUTO-ENTMCNC: 32.9 % (ref 32–36)
MCV RBC AUTO: 98.8 FL (ref 78–100)
MONOCYTES ABSOLUTE: 0.6 K/CU MM
MONOCYTES RELATIVE PERCENT: 4.2 % (ref 0–4)
NUCLEATED RBC %: 0 %
PDW BLD-RTO: 12.1 % (ref 11.7–14.9)
PLATELET # BLD: 208 K/CU MM (ref 140–440)
PMV BLD AUTO: 11.3 FL (ref 7.5–11.1)
POTASSIUM SERPL-SCNC: 5 MMOL/L (ref 3.5–5.1)
RBC # BLD: 4.33 M/CU MM (ref 4.2–5.4)
SEGMENTED NEUTROPHILS ABSOLUTE COUNT: 13 K/CU MM
SEGMENTED NEUTROPHILS RELATIVE PERCENT: 87.9 % (ref 36–66)
SODIUM BLD-SCNC: 137 MMOL/L (ref 135–145)
SPECIMEN: NORMAL
STREP A DIRECT SCREEN: NEGATIVE
TOTAL IMMATURE NEUTOROPHIL: 0.07 K/CU MM
TOTAL NUCLEATED RBC: 0 K/CU MM
TOTAL PROTEIN: 5.2 GM/DL (ref 6.4–8.2)
WBC # BLD: 14.8 K/CU MM (ref 4–10.5)

## 2024-01-25 PROCEDURE — 94761 N-INVAS EAR/PLS OXIMETRY MLT: CPT

## 2024-01-25 PROCEDURE — 94640 AIRWAY INHALATION TREATMENT: CPT

## 2024-01-25 PROCEDURE — 80053 COMPREHEN METABOLIC PANEL: CPT

## 2024-01-25 PROCEDURE — 6370000000 HC RX 637 (ALT 250 FOR IP): Performed by: PHYSICIAN ASSISTANT

## 2024-01-25 PROCEDURE — 93010 ELECTROCARDIOGRAM REPORT: CPT | Performed by: INTERNAL MEDICINE

## 2024-01-25 PROCEDURE — 6360000002 HC RX W HCPCS: Performed by: INTERNAL MEDICINE

## 2024-01-25 PROCEDURE — 36415 COLL VENOUS BLD VENIPUNCTURE: CPT

## 2024-01-25 PROCEDURE — 85025 COMPLETE CBC W/AUTO DIFF WBC: CPT

## 2024-01-25 PROCEDURE — 6370000000 HC RX 637 (ALT 250 FOR IP): Performed by: INTERNAL MEDICINE

## 2024-01-25 PROCEDURE — APPSS30 APP SPLIT SHARED TIME 16-30 MINUTES

## 2024-01-25 PROCEDURE — 6370000000 HC RX 637 (ALT 250 FOR IP): Performed by: STUDENT IN AN ORGANIZED HEALTH CARE EDUCATION/TRAINING PROGRAM

## 2024-01-25 RX ORDER — CLOPIDOGREL BISULFATE 75 MG/1
75 TABLET ORAL DAILY
Qty: 30 TABLET | Refills: 3 | Status: ON HOLD | OUTPATIENT
Start: 2024-01-25

## 2024-01-25 RX ORDER — NICOTINE 21 MG/24HR
1 PATCH, TRANSDERMAL 24 HOURS TRANSDERMAL DAILY
Qty: 42 PATCH | Refills: 0 | Status: ON HOLD | OUTPATIENT
Start: 2024-01-25 | End: 2024-03-07

## 2024-01-25 RX ORDER — BUDESONIDE AND FORMOTEROL FUMARATE DIHYDRATE 160; 4.5 UG/1; UG/1
2 AEROSOL RESPIRATORY (INHALATION)
Qty: 10.2 G | Refills: 3 | Status: SHIPPED | OUTPATIENT
Start: 2024-01-25 | End: 2024-01-25 | Stop reason: HOSPADM

## 2024-01-25 RX ORDER — PREDNISONE 50 MG/1
50 TABLET ORAL DAILY
Qty: 5 TABLET | Refills: 0 | Status: SHIPPED | OUTPATIENT
Start: 2024-01-25 | End: 2024-01-30

## 2024-01-25 RX ORDER — BENZONATATE 100 MG/1
100 CAPSULE ORAL 3 TIMES DAILY
Qty: 21 CAPSULE | Refills: 0 | Status: SHIPPED | OUTPATIENT
Start: 2024-01-25 | End: 2024-02-01

## 2024-01-25 RX ORDER — ALBUTEROL SULFATE 2.5 MG/3ML
2.5 SOLUTION RESPIRATORY (INHALATION) EVERY 6 HOURS PRN
Qty: 120 EACH | Refills: 2 | Status: ON HOLD | OUTPATIENT
Start: 2024-01-25

## 2024-01-25 RX ADMIN — ASPIRIN 81 MG: 81 TABLET, CHEWABLE ORAL at 09:31

## 2024-01-25 RX ADMIN — METHYLPREDNISOLONE SODIUM SUCCINATE 40 MG: 40 INJECTION, POWDER, FOR SOLUTION INTRAMUSCULAR; INTRAVENOUS at 06:13

## 2024-01-25 RX ADMIN — BENZONATATE 100 MG: 100 CAPSULE ORAL at 09:31

## 2024-01-25 RX ADMIN — ALBUTEROL SULFATE 2.5 MG: 2.5 SOLUTION RESPIRATORY (INHALATION) at 11:03

## 2024-01-25 RX ADMIN — PANTOPRAZOLE SODIUM 40 MG: 40 TABLET, DELAYED RELEASE ORAL at 06:13

## 2024-01-25 RX ADMIN — METOPROLOL TARTRATE 25 MG: 25 TABLET, FILM COATED ORAL at 09:31

## 2024-01-25 RX ADMIN — BUDESONIDE AND FORMOTEROL FUMARATE DIHYDRATE 2 PUFF: 160; 4.5 AEROSOL RESPIRATORY (INHALATION) at 07:14

## 2024-01-25 RX ADMIN — ROSUVASTATIN CALCIUM 20 MG: 20 TABLET, COATED ORAL at 09:31

## 2024-01-25 RX ADMIN — CLOPIDOGREL BISULFATE 75 MG: 75 TABLET ORAL at 09:31

## 2024-01-25 RX ADMIN — ALBUTEROL SULFATE 2.5 MG: 2.5 SOLUTION RESPIRATORY (INHALATION) at 07:14

## 2024-01-25 RX ADMIN — SIMETHICONE 80 MG: 80 TABLET, CHEWABLE ORAL at 10:04

## 2024-01-25 RX ADMIN — BENZONATATE 100 MG: 100 CAPSULE ORAL at 14:11

## 2024-01-25 RX ADMIN — ALBUTEROL SULFATE 2.5 MG: 2.5 SOLUTION RESPIRATORY (INHALATION) at 15:04

## 2024-01-25 RX ADMIN — AMLODIPINE BESYLATE 5 MG: 5 TABLET ORAL at 09:31

## 2024-01-25 RX ADMIN — ACETAMINOPHEN 650 MG: 325 TABLET ORAL at 11:22

## 2024-01-25 ASSESSMENT — PAIN DESCRIPTION - ONSET
ONSET: ON-GOING
ONSET: ON-GOING

## 2024-01-25 ASSESSMENT — PAIN DESCRIPTION - PAIN TYPE
TYPE: ACUTE PAIN
TYPE: ACUTE PAIN

## 2024-01-25 ASSESSMENT — PAIN DESCRIPTION - LOCATION
LOCATION: HEAD
LOCATION: HEAD

## 2024-01-25 ASSESSMENT — PAIN DESCRIPTION - FREQUENCY
FREQUENCY: CONTINUOUS
FREQUENCY: CONTINUOUS

## 2024-01-25 ASSESSMENT — PAIN - FUNCTIONAL ASSESSMENT
PAIN_FUNCTIONAL_ASSESSMENT: ACTIVITIES ARE NOT PREVENTED
PAIN_FUNCTIONAL_ASSESSMENT: ACTIVITIES ARE NOT PREVENTED

## 2024-01-25 ASSESSMENT — PAIN SCALES - GENERAL
PAINLEVEL_OUTOF10: 0
PAINLEVEL_OUTOF10: 2
PAINLEVEL_OUTOF10: 5

## 2024-01-25 ASSESSMENT — PAIN DESCRIPTION - DESCRIPTORS
DESCRIPTORS: ACHING
DESCRIPTORS: ACHING

## 2024-01-25 ASSESSMENT — PAIN DESCRIPTION - ORIENTATION
ORIENTATION: MID
ORIENTATION: MID

## 2024-01-25 ASSESSMENT — PAIN DESCRIPTION - DIRECTION
RADIATING_TOWARDS: LOCALIZED
RADIATING_TOWARDS: LOCALIZED

## 2024-01-25 NOTE — DISCHARGE SUMMARY
V2.0  Discharge Summary    Name:  Kate Minor /Age/Sex: 1960 (63 y.o. female)   Admit Date: 2024  Discharge Date: 24    MRN & CSN:  [id number] & [id number] Encounter Date and Time 24 9:11 AM EST    Attending:  Grisel Neely* Discharging Provider: Grisel Neely MD       Hospital Course:     Brief HPI: Kate Minor is a 63 y.o. female with pmh of hypertension, hyperlipidemia, history of stroke, COPD and tobacco use dependence who presents with NSTEMI (non-ST elevated myocardial infarction) (HCC)     Brief Problem Based Course:   NSTEMI  Patient was transferred from [place] where she initially presented due to complaints of shortness of breath  -- Troponins were elevated at 70, trended up to 209  -- She was initially on heparin drip and did undergo cath with cardiology  -- Cardiology on board, appreciate recs, post cath, recommendations are to continue DAPT for 1 year, continue Norvasc 5 mg daily, metoprolol 25 mg twice daily and Crestor 20 mg daily.  Outpatient follow-up    Acute respiratory failure with hypoxia -resolved  COPD with exacerbation -resolved  Patient on initial presentation at [facility] was wheezing, O2 saturation was 87% on room air in the ED at [place] and she briefly required BiPAP for her work of breathing but was weaned to room air  -- Patient is maintaining saturations greater than 95% on room air, on ambulation O2 saturation did not drop below 96%  -- Switched to p.o. prednisone 50 mg on discharge for a burst of 5 days  -- Continue DuoNebs and LAMA/LABA on DC  -- Pulmonology on board, appreciate recs, outpatient follow-up     Carotid stenosis  History of carotid stenosis, reported that stent was recommended   -- Carotid ultrasound showed severe to 50% stenosis in the right and left ICAs.  Bilateral vertebral arteries are patent with flow in the normal direction  -- Continue ASA and statin     Salas's esophagus  -- Continue on PPI     Tobacco 
EOAE (evoked otoacoustic emission)

## 2024-01-25 NOTE — PROGRESS NOTES
pulmonary      SUBJECTIVE:  no sob and sleeping     OBJECTIVE    VITALS:  BP (!) 122/52   Pulse 67   Temp 97.9 °F (36.6 °C) (Oral)   Resp 26   Ht 1.473 m (4' 10\")   Wt 31.8 kg (70 lb)   SpO2 95%   BMI 14.63 kg/m²   HEAD AND FACE EXAM:  No throat injection, no active exudate,no thrush  NECK EXAM;No JVD, no masses, symmetrical  CHEST EXAM; Expansion equal and symmetrical, no masses  LUNG EXAM; Good breath sounds bilaterally. There are expiratory wheezes both lungs, there are crackles at both lung bases  CARDIOVASCULAR EXAM: Positive S1 and S2, no S3 or S4, no clicks ,no murmurs  RIGHT AND LEFT LOWER EXTRIMITY EXAM: No edema, no swelling,           LABS   Lab Results   Component Value Date    WBC 14.8 (H) 01/25/2024    HGB 14.1 01/25/2024    HCT 42.8 01/25/2024    MCV 98.8 01/25/2024     01/25/2024     Lab Results   Component Value Date    CREATININE 0.7 01/25/2024    BUN 19 01/25/2024     01/25/2024    K 5.0 01/25/2024     01/25/2024    CO2 26 01/25/2024     Lab Results   Component Value Date    INR 1.1 01/23/2024    PROTIME 14.6 (H) 01/23/2024          Lab Results   Component Value Date/Time    PHOS 4.2 01/24/2024 03:12 AM      No results for input(s): \"PH\", \"PO2ART\", \"AFB7MAE\", \"HCO3\", \"BEART\", \"O2SAT\" in the last 72 hours.      Wt Readings from Last 3 Encounters:   01/24/24 31.8 kg (70 lb)   01/23/24 31.3 kg (68 lb 14.4 oz)   10/25/23 32.3 kg (71 lb 2 oz)               ASSESMENT  Ac copd        PLAN  Bd rx  steroids    1/25/2024  Rafael Fallon MD, M.D.

## 2024-01-25 NOTE — PROGRESS NOTES
4 Eyes Skin Assessment     NAME:  Kate Minor  YOB: 1960  MEDICAL RECORD NUMBER:  [id number]    The patient is being assessed for  Admission    I agree that at least one RN has performed a thorough Head to Toe Skin Assessment on the patient. ALL assessment sites listed below have been assessed.      Areas assessed by both nurses:    Head, Face, Ears, Shoulders, Back, Chest, Arms, Elbows, Hands, Sacrum. Buttock, Coccyx, Ischium, and Legs. Feet and Heels        Does the Patient have a Wound? No noted wound(s)       Navid Prevention initiated by RN: No  Wound Care Orders initiated by RN: No    Pressure Injury (Stage 3,4, Unstageable, DTI, NWPT, and Complex wounds) if present, place Wound referral order by RN under : No    New Ostomies, if present place, Ostomy referral order under : No     Nurse 1 eSignature: Electronically signed by Veronica Nolan RN on 1/24/24 at 2:19 AM EST    **SHARE this note so that the co-signing nurse can place an eSignature**    Nurse 2 eSignature: Electronically signed by Nita Fregoso RN on 1/24/24 at 10:47 PM EST

## 2024-01-25 NOTE — PROGRESS NOTES
[place] [address]  Phone: [phone]    Fax [phone]                  Renzo Kern MD, [facility]       Augustin Sanders MD, [facility]  Tommy Gamino MD, [facility]    MD Doc Allen MD Tariq Rizvi, MD Bilal Alam, MD Dr. Waseem Sajjad MD Melissa Kellis, KJ Perez, KJ Bowling, KJ Kidd, APRCHASE Chang PA-C    CARDIOLOGY  NOTE      Name:  Kate Minor /Age/Sex: 1960  (63 y.o. female)   MRN & CSN:  [id number] & [id number] Admission Date/Time: 2024  7:54 PM   Location:  [address] [place] [address] PCP: Sharyn Delatorre APRN - CNP       Hospital Day: 3    - Cardiology consult is for: NSTEMI      ASSESSMENT/ PLAN:  Non-ST elevation myocardial infarction  Shortness of breath with diaphoresis leading to admission  History of nonobstructive CAD with prior lesions noted in circumflex artery and ramus intermedius.  Essential hypertension  Hyperlipidemia  Moderate to severe mitral regurgitation  Mild to moderate aortic regurgitation not in acute CHF at this time     As per patient in the last 2 years she was also in [place] where she was noted to have coronary disease and was offered PCI but could not be performed    Patient is complaining of chest discomfort however pleuritic in nature and in setting of COPD exacerbation.    Patient underwent left heart catheterization  which showed mild-moderate CAD.  No intervention was required.  Aggressive lifestyle modifications recommended    Will continue aspirin and Plavix for 1 year    Continue amlodipine 5 mg daily  Continue metoprolol tartrate 25 twice daily  Continue Crestor 20 mg p.o. daily        Nicotine dependence: Patient was counseled extensively about smoking cessation  Acute exacerbation of COPD  Pulmonary hypertension: On IV steroids, management as per pulmonology and primary        BMP:   Recent Labs     01/23/24  2243 01/24/24  0312 01/25/24  0555    141 137   K 4.3 4.3 5.0    107 102   CO2 25 24 26   PHOS  --  4.2  --    BUN 17 17 19   CREATININE 0.7 0.7 0.7       LIVER PROFILE:   Recent Labs     01/23/24  0100 01/24/24  0312 01/25/24  0555   AST 65* 22  22 13*   ALT 41* 24  24 20   BILIDIR  --  0.2  --    BILITOT 0.2 0.2  0.2 0.2   ALKPHOS 87 75  75 78       PT/INR:   Recent Labs     01/23/24  0930   PROTIME 14.6*   INR 1.1       APTT:   Recent Labs     01/23/24  0930   APTT 40.5*       BNP:  No results for input(s): \"BNP\" in the last 72 hours.  TROPONIN: No results for input(s): \"TROPONINT\" in the last 72 hours.  Labs, consult, tests reviewed          Delvis Chang PA-C, 1/25/2024 11:44 AM

## 2024-01-25 NOTE — PROGRESS NOTES
Outpatient Pharmacy Progress Note for Meds-to-Beds    Total number of Prescriptions Filled: 3  The following medications were dispensed to the patient during the discharge process:  benzonatate  clopidogrel  tiotropium-olodaterol (Stiolto inhaler)  Prednisone     Additional Documentation:  Patient picked-up the medication(s) in the OP Pharmacy  Med Assist was able to help cover the cost of the medications to provide patient with a $0.00 co-pay.   A manufacture coupon card for Stiolto was applied to provide patient a $0.00 co-pay for this medication.   Symbicort change to Stiolto since patient does not have insurance and cost of Symbicort is >$300. Pharmacy does have Stiolto inhalers free from the .       Thank you for letting us serve your patients.  [facility] - [address] [address]    Phone: [phone]    Fax: [phone]

## 2024-01-25 NOTE — PLAN OF CARE
Problem: Discharge Planning  Goal: Discharge to home or other facility with appropriate resources  Outcome: Progressing  Flowsheets (Taken 1/25/2024 0927)  Discharge to home or other facility with appropriate resources:   Identify barriers to discharge with patient and caregiver   Arrange for needed discharge resources and transportation as appropriate   Identify discharge learning needs (meds, wound care, etc)   Arrange for interpreters to assist at discharge as needed   Refer to discharge planning if patient needs post-hospital services based on physician order or complex needs related to functional status, cognitive ability or social support system     Problem: Safety - Adult  Goal: Free from fall injury  Outcome: Progressing     Problem: Pain  Goal: Verbalizes/displays adequate comfort level or baseline comfort level  Outcome: Progressing     Problem: Chronic Conditions and Co-morbidities  Goal: Patient's chronic conditions and co-morbidity symptoms are monitored and maintained or improved  Outcome: Progressing  Flowsheets (Taken 1/25/2024 0927)  Care Plan - Patient's Chronic Conditions and Co-Morbidity Symptoms are Monitored and Maintained or Improved:   Monitor and assess patient's chronic conditions and comorbid symptoms for stability, deterioration, or improvement   Collaborate with multidisciplinary team to address chronic and comorbid conditions and prevent exacerbation or deterioration   Update acute care plan with appropriate goals if chronic or comorbid symptoms are exacerbated and prevent overall improvement and discharge     Problem: Nutrition Deficit:  Goal: Optimize nutritional status  Outcome: Progressing

## 2024-01-25 NOTE — CARE COORDINATION
Contacted by [facility] pharmacy to assist with medication cost(s) at time of discharge.  Approved a 1x voucher for benzonatate, clopidogrel and prednisone  Faxed voucher to [facility] pharmacy.    Added patient to the flagged list. If pt requests additional help a Med Assist application must be completed and required documents provided to determine eligibility for ongoing assistance.

## 2024-01-25 NOTE — PROGRESS NOTES
[place] [address]  Phone: [phone]    Fax [phone]                  Renzo Kern MD, [facility]       Augustin Sanders MD, [facility]  Tommy Gamino MD, [facility]    MD Doc Allen MD Tariq Rizvi, MD Bilal Alam, MD Dr. Waseem Sajjad MD Melissa Kellis, APRCHASE Perez, KJ Bowling, KJ Kidd, APRCHASE Chang PA-C    CARDIOLOGY  NOTE      Name:  Kate Minor /Age/Sex: 1960  (63 y.o. female)   MRN & CSN:  [id number] & [id number] Admission Date/Time: 2024  7:54 PM   Location:  [address] [place] [address] PCP: Sharyn Delatorre APRN - CNP       Hospital Day: 3    - Cardiology consult is for: NSTEMI      ASSESSMENT/ PLAN:  Non-ST elevation myocardial infarction  Shortness of breath with diaphoresis leading to admission  History of nonobstructive CAD with prior lesions noted in circumflex artery and ramus intermedius.  Essential hypertension  Hyperlipidemia     As per patient in the last 2 years she was also in [place] where she was noted to have coronary disease and was offered PCI but could not be performed        Continue amlodipine 5 mg daily  Continue metoprolol tartrate 25 twice daily  Continue Crestor 20 mg p.o. daily     Patient underwent left heart catheterization :           Nicotine dependence: Patient was counseled extensively about smoking cessation  Acute exacerbation of COPD: On IV steroids, management as per pulmonology and primary care.        Echo 2024  •  Left Ventricle: Hyperdynamic left ventricular systolic function with a visually estimated EF of >65%.  •  Aortic Valve: Sclerosis of the aortic valve cusp. Mild to moderate regurgitation. AV PHT is 509 ms.  •  Mitral Valve: Moderate to severe eccentric mitral regurgitation.  •  Tricuspid Valve: Mild to moderate regurgitation. The estimated RVSP is 47 mmHg suggestive of

## 2024-01-26 LAB
CULTURE: NORMAL
GRAM SMEAR: NORMAL
Lab: NORMAL
SPECIMEN: NORMAL

## 2024-02-02 ENCOUNTER — OFFICE VISIT (OUTPATIENT)
Dept: CARDIOLOGY CLINIC | Age: 64
End: 2024-02-02
Payer: MEDICAID

## 2024-02-02 VITALS
HEART RATE: 93 BPM | HEIGHT: 58 IN | BODY MASS INDEX: 15.32 KG/M2 | WEIGHT: 73 LBS | OXYGEN SATURATION: 97 % | DIASTOLIC BLOOD PRESSURE: 78 MMHG | SYSTOLIC BLOOD PRESSURE: 140 MMHG

## 2024-02-02 DIAGNOSIS — R07.9 CHEST PAIN, UNSPECIFIED TYPE: ICD-10-CM

## 2024-02-02 DIAGNOSIS — R06.02 SHORTNESS OF BREATH: ICD-10-CM

## 2024-02-02 DIAGNOSIS — R06.02 SOB (SHORTNESS OF BREATH): ICD-10-CM

## 2024-02-02 DIAGNOSIS — I63.9 CEREBROVASCULAR ACCIDENT (CVA), UNSPECIFIED MECHANISM (HCC): ICD-10-CM

## 2024-02-02 DIAGNOSIS — R00.2 PALPITATION: ICD-10-CM

## 2024-02-02 DIAGNOSIS — R00.0 TACHYCARDIA: ICD-10-CM

## 2024-02-02 DIAGNOSIS — I25.10 CORONARY ARTERY DISEASE INVOLVING NATIVE CORONARY ARTERY OF NATIVE HEART WITHOUT ANGINA PECTORIS: Primary | ICD-10-CM

## 2024-02-02 PROCEDURE — 3077F SYST BP >= 140 MM HG: CPT | Performed by: INTERNAL MEDICINE

## 2024-02-02 PROCEDURE — 1111F DSCHRG MED/CURRENT MED MERGE: CPT | Performed by: INTERNAL MEDICINE

## 2024-02-02 PROCEDURE — 3078F DIAST BP <80 MM HG: CPT | Performed by: INTERNAL MEDICINE

## 2024-02-02 PROCEDURE — 99214 OFFICE O/P EST MOD 30 MIN: CPT | Performed by: INTERNAL MEDICINE

## 2024-02-02 RX ORDER — AZITHROMYCIN 250 MG/1
250 TABLET, FILM COATED ORAL SEE ADMIN INSTRUCTIONS
Qty: 6 TABLET | Refills: 0 | Status: ON HOLD | OUTPATIENT
Start: 2024-02-02 | End: 2024-02-07

## 2024-02-02 NOTE — PROGRESS NOTES
(hyperlipidemia), Hypertension, NSTEMI (non-ST elevated myocardial infarction) (HCC), Pulmonary emphysema (HCC), and TIA (transient ischemic attack). and presents with     Plan:    Bronchtis: add z pack, recommend to stop smoking, conseling provided, add nicotine patch  CAD: know CAD on optimal medicatrions,had repeated LHC last month, 40-50% ramnus, lcx and 20-30% diffuse LAD disease,rca 30-50% LVEF Is normal, had NSTEMI, Continue aspirin and plavix  continue statins, betablockers, checked stress test is miki and echo shwoed mild to moderate MR and moderate pulmonary HTN, will defer LHC  Wheezing: recommend to stop smoking  Palpitations: 24 hrs holter monitor oshowed sinus rhyhtm with  7 beats run of ATRIA TACHYCARDIA and 5640 pad AND 1336 pvc, CONTINEU LOPRESSOR,refer to EP  H/o stroke'; left sided, resolved, continue aspirin, statins, plavix and blood pressure, check carotids  Dyslipidemia: continue statins, LDL is 58  HTN: nral, off valsartan,, continue lopressor, norvasc now  SALINAS'S esophagus\" stable  Health maintenance: exerise and diet  All labs, medications and tests reviewed, continue all other medications of all above medical condition listed as is.    [unfilled]

## 2024-02-02 NOTE — PATIENT INSTRUCTIONS
Please be informed that if you contact our office outside of normal business hours the physician on call cannot help with any scheduling or rescheduling issues, procedure instruction questions or any type of medication issue.    We advise you for any urgent/emergency that you go to the nearest emergency room!    PLEASE CALL OUR OFFICE DURING NORMAL BUSINESS HOURS    Monday - Friday   8 am to 5 pm    [place]: [phone]    [place]: [phone]    [place]:  [phone]  **It is YOUR responsibilty to bring medication bottles and/or updated medication list to EACH APPOINTMENT. This will allow us to better serve you and all your healthcare needs**  Thank you for allowing us to care for you today!   We want to ensure we can follow your treatment plan and we strive to give you the best outcomes and experience possible.   If you ever have a life threatening emergency and call 911 - for an ambulance (EMS)   Our providers can only care for you at:   [facility] or [facility].   Even if you have someone take you or you drive yourself we can only care for you in a [facility] facility. Our providers are not setup at the other healthcare locations!   We are committed to providing you the best care possible.    If you receive a survey after visiting one of our offices, please take time to share your experience concerning your physician office visit.  These surveys are confidential and no health information about you is shared.    We are eager to improve for you and we are counting on your feedback to help make that happen.

## 2024-02-03 ENCOUNTER — HOSPITAL ENCOUNTER (INPATIENT)
Age: 64
LOS: 2 days | Discharge: HOME | DRG: 190 | End: 2024-02-07
Attending: EMERGENCY MEDICINE | Admitting: INTERNAL MEDICINE
Payer: MEDICAID

## 2024-02-03 DIAGNOSIS — R79.89 ELEVATED TROPONIN: ICD-10-CM

## 2024-02-03 DIAGNOSIS — J44.1 COPD EXACERBATION (HCC): Primary | ICD-10-CM

## 2024-02-03 PROCEDURE — 99285 EMERGENCY DEPT VISIT HI MDM: CPT

## 2024-02-04 ENCOUNTER — APPOINTMENT (OUTPATIENT)
Dept: GENERAL RADIOLOGY | Age: 64
DRG: 190 | End: 2024-02-04
Payer: MEDICAID

## 2024-02-04 LAB
ALBUMIN SERPL-MCNC: 4.1 GM/DL (ref 3.4–5)
ALP BLD-CCNC: 105 IU/L (ref 40–129)
ALT SERPL-CCNC: 40 U/L (ref 10–40)
ANION GAP SERPL CALCULATED.3IONS-SCNC: 13 MMOL/L (ref 7–16)
AST SERPL-CCNC: 42 IU/L (ref 15–37)
B PARAP IS1001 DNA NPH QL NAA+NON-PROBE: NOT DETECTED
B PERT.PT PRMT NPH QL NAA+NON-PROBE: NOT DETECTED
BASE EXCESS MIXED: 0.4 (ref 0–3)
BASE EXCESS MIXED: 0.7 (ref 0–3)
BASE EXCESS MIXED: 0.8 (ref 0–3)
BASE EXCESS: ABNORMAL (ref 0–3)
BASOPHILS ABSOLUTE: 0.1 K/CU MM
BASOPHILS RELATIVE PERCENT: 0.3 % (ref 0–1)
BILIRUB SERPL-MCNC: 0.2 MG/DL (ref 0–1)
BUN SERPL-MCNC: 17 MG/DL (ref 6–23)
C PNEUM DNA NPH QL NAA+NON-PROBE: NOT DETECTED
CALCIUM SERPL-MCNC: 8.7 MG/DL (ref 8.3–10.6)
CHLORIDE BLD-SCNC: 102 MMOL/L (ref 99–110)
CO2 CONTENT: 26.7 MMOL/L (ref 21–32)
CO2 CONTENT: 27.6 MMOL/L (ref 21–32)
CO2 CONTENT: 31.5 MMOL/L (ref 21–32)
CO2: 26 MMOL/L (ref 21–32)
CREAT SERPL-MCNC: 0.7 MG/DL (ref 0.6–1.1)
DIFFERENTIAL TYPE: ABNORMAL
EOSINOPHILS ABSOLUTE: 0.4 K/CU MM
EOSINOPHILS RELATIVE PERCENT: 2.5 % (ref 0–3)
FLUAV H1 2009 PAN RNA NPH NAA+NON-PROBE: NOT DETECTED
FLUAV H1 RNA NPH QL NAA+NON-PROBE: NOT DETECTED
FLUAV H3 RNA NPH QL NAA+NON-PROBE: NOT DETECTED
FLUAV RNA NPH QL NAA+NON-PROBE: NOT DETECTED
FLUBV RNA NPH QL NAA+NON-PROBE: NOT DETECTED
GFR SERPL CREATININE-BSD FRML MDRD: >60 ML/MIN/1.73M2
GLUCOSE SERPL-MCNC: 120 MG/DL (ref 70–99)
HADV DNA NPH QL NAA+NON-PROBE: NOT DETECTED
HCO3 VENOUS: 25.4 MMOL/L (ref 22–29)
HCO3 VENOUS: 26.2 MMOL/L (ref 22–29)
HCO3 VENOUS: 29.3 MMOL/L (ref 22–29)
HCOV 229E RNA NPH QL NAA+NON-PROBE: NOT DETECTED
HCOV HKU1 RNA NPH QL NAA+NON-PROBE: NOT DETECTED
HCOV NL63 RNA NPH QL NAA+NON-PROBE: NOT DETECTED
HCOV OC43 RNA NPH QL NAA+NON-PROBE: NOT DETECTED
HCT VFR BLD CALC: 47.4 % (ref 37–47)
HEMOGLOBIN: 15.3 GM/DL (ref 12.5–16)
HMPV RNA NPH QL NAA+NON-PROBE: NOT DETECTED
HPIV1 RNA NPH QL NAA+NON-PROBE: NOT DETECTED
HPIV2 RNA NPH QL NAA+NON-PROBE: NOT DETECTED
HPIV3 RNA NPH QL NAA+NON-PROBE: NOT DETECTED
HPIV4 RNA NPH QL NAA+NON-PROBE: NOT DETECTED
IMMATURE NEUTROPHIL %: 0.4 % (ref 0–0.43)
LACTIC ACID, SEPSIS: 0.8 MMOL/L (ref 0.4–2)
LYMPHOCYTES ABSOLUTE: 2.4 K/CU MM
LYMPHOCYTES RELATIVE PERCENT: 16.3 % (ref 24–44)
M PNEUMO DNA NPH QL NAA+NON-PROBE: NOT DETECTED
MCH RBC QN AUTO: 32.8 PG (ref 27–31)
MCHC RBC AUTO-ENTMCNC: 32.3 % (ref 32–36)
MCV RBC AUTO: 101.5 FL (ref 78–100)
MONOCYTES ABSOLUTE: 0.8 K/CU MM
MONOCYTES RELATIVE PERCENT: 5.6 % (ref 0–4)
O2 SAT, VEN: 58.6 % (ref 50–70)
O2 SAT, VEN: 93 % (ref 50–70)
O2 SAT, VEN: 98.2 % (ref 50–70)
PCO2, VEN: 40.2 MMHG (ref 41–51)
PCO2, VEN: 45.4 MMHG (ref 41–51)
PCO2, VEN: 71.1 MMHG (ref 41–51)
PDW BLD-RTO: 12.8 % (ref 11.7–14.9)
PH VENOUS: 7.22 (ref 7.32–7.43)
PH VENOUS: 7.37 (ref 7.32–7.43)
PH VENOUS: 7.41 (ref 7.32–7.43)
PLATELET # BLD: 346 K/CU MM (ref 140–440)
PMV BLD AUTO: 9.8 FL (ref 7.5–11.1)
PO2, VEN: 112 MMHG (ref 28–48)
PO2, VEN: 37.6 MMHG (ref 28–48)
PO2, VEN: 66.3 MMHG (ref 28–48)
POTASSIUM SERPL-SCNC: 5 MMOL/L (ref 3.5–5.1)
PRO-BNP: 1207 PG/ML
RBC # BLD: 4.67 M/CU MM (ref 4.2–5.4)
RSV RNA NPH QL NAA+NON-PROBE: NOT DETECTED
RV+EV RNA NPH QL NAA+NON-PROBE: NOT DETECTED
SARS-COV-2 RNA NPH QL NAA+NON-PROBE: NOT DETECTED
SEGMENTED NEUTROPHILS ABSOLUTE COUNT: 10.9 K/CU MM
SEGMENTED NEUTROPHILS RELATIVE PERCENT: 74.9 % (ref 36–66)
SODIUM BLD-SCNC: 141 MMOL/L (ref 135–145)
SOURCE, BLOOD GAS: ABNORMAL
TOTAL IMMATURE NEUTOROPHIL: 0.06 K/CU MM
TOTAL PROTEIN: 6.8 GM/DL (ref 6.4–8.2)
TROPONIN, HIGH SENSITIVITY: 26 NG/L (ref 0–14)
TROPONIN, HIGH SENSITIVITY: 43 NG/L (ref 0–14)
TROPONIN, HIGH SENSITIVITY: 44 NG/L (ref 0–14)
TROPONIN, HIGH SENSITIVITY: 45 NG/L (ref 0–14)
TSH SERPL DL<=0.005 MIU/L-ACNC: 1.05 UIU/ML (ref 0.27–4.2)
WBC # BLD: 14.6 K/CU MM (ref 4–10.5)

## 2024-02-04 PROCEDURE — 84443 ASSAY THYROID STIM HORMONE: CPT

## 2024-02-04 PROCEDURE — 6360000002 HC RX W HCPCS: Performed by: NURSE PRACTITIONER

## 2024-02-04 PROCEDURE — 6370000000 HC RX 637 (ALT 250 FOR IP): Performed by: EMERGENCY MEDICINE

## 2024-02-04 PROCEDURE — 85025 COMPLETE CBC W/AUTO DIFF WBC: CPT

## 2024-02-04 PROCEDURE — 87040 BLOOD CULTURE FOR BACTERIA: CPT

## 2024-02-04 PROCEDURE — 6360000002 HC RX W HCPCS: Performed by: EMERGENCY MEDICINE

## 2024-02-04 PROCEDURE — 0202U NFCT DS 22 TRGT SARS-COV-2: CPT

## 2024-02-04 PROCEDURE — 2700000000 HC OXYGEN THERAPY PER DAY

## 2024-02-04 PROCEDURE — 6370000000 HC RX 637 (ALT 250 FOR IP): Performed by: NURSE PRACTITIONER

## 2024-02-04 PROCEDURE — 93005 ELECTROCARDIOGRAM TRACING: CPT | Performed by: NURSE PRACTITIONER

## 2024-02-04 PROCEDURE — G0378 HOSPITAL OBSERVATION PER HR: HCPCS

## 2024-02-04 PROCEDURE — 83605 ASSAY OF LACTIC ACID: CPT

## 2024-02-04 PROCEDURE — 84484 ASSAY OF TROPONIN QUANT: CPT

## 2024-02-04 PROCEDURE — 80053 COMPREHEN METABOLIC PANEL: CPT

## 2024-02-04 PROCEDURE — 71045 X-RAY EXAM CHEST 1 VIEW: CPT

## 2024-02-04 PROCEDURE — 82805 BLOOD GASES W/O2 SATURATION: CPT

## 2024-02-04 PROCEDURE — 94640 AIRWAY INHALATION TREATMENT: CPT

## 2024-02-04 PROCEDURE — 93005 ELECTROCARDIOGRAM TRACING: CPT | Performed by: EMERGENCY MEDICINE

## 2024-02-04 PROCEDURE — 2580000003 HC RX 258: Performed by: NURSE PRACTITIONER

## 2024-02-04 PROCEDURE — 83880 ASSAY OF NATRIURETIC PEPTIDE: CPT

## 2024-02-04 RX ORDER — POLYETHYLENE GLYCOL 3350 17 G/17G
17 POWDER, FOR SOLUTION ORAL DAILY PRN
Status: DISCONTINUED | OUTPATIENT
Start: 2024-02-04 | End: 2024-02-04

## 2024-02-04 RX ORDER — MAGNESIUM SULFATE IN WATER 40 MG/ML
2000 INJECTION, SOLUTION INTRAVENOUS PRN
Status: DISCONTINUED | OUTPATIENT
Start: 2024-02-04 | End: 2024-02-04

## 2024-02-04 RX ORDER — ALBUTEROL SULFATE 2.5 MG/3ML
2.5 SOLUTION RESPIRATORY (INHALATION) ONCE
Status: COMPLETED | OUTPATIENT
Start: 2024-02-04 | End: 2024-02-04

## 2024-02-04 RX ORDER — SODIUM CHLORIDE 0.9 % (FLUSH) 0.9 %
5-40 SYRINGE (ML) INJECTION EVERY 12 HOURS SCHEDULED
Status: DISCONTINUED | OUTPATIENT
Start: 2024-02-04 | End: 2024-02-07 | Stop reason: HOSPADM

## 2024-02-04 RX ORDER — ENOXAPARIN SODIUM 100 MG/ML
30 INJECTION SUBCUTANEOUS DAILY
Status: DISCONTINUED | OUTPATIENT
Start: 2024-02-04 | End: 2024-02-07 | Stop reason: HOSPADM

## 2024-02-04 RX ORDER — ASPIRIN 81 MG/1
81 TABLET ORAL DAILY
Status: DISCONTINUED | OUTPATIENT
Start: 2024-02-04 | End: 2024-02-07 | Stop reason: HOSPADM

## 2024-02-04 RX ORDER — HYDROXYZINE PAMOATE 25 MG/1
50 CAPSULE ORAL 3 TIMES DAILY PRN
Status: DISCONTINUED | OUTPATIENT
Start: 2024-02-04 | End: 2024-02-04

## 2024-02-04 RX ORDER — NICOTINE 21 MG/24HR
1 PATCH, TRANSDERMAL 24 HOURS TRANSDERMAL DAILY
Status: DISCONTINUED | OUTPATIENT
Start: 2024-02-04 | End: 2024-02-07 | Stop reason: HOSPADM

## 2024-02-04 RX ORDER — POTASSIUM CHLORIDE 7.45 MG/ML
10 INJECTION INTRAVENOUS PRN
Status: DISCONTINUED | OUTPATIENT
Start: 2024-02-04 | End: 2024-02-04

## 2024-02-04 RX ORDER — SODIUM CHLORIDE 9 MG/ML
INJECTION, SOLUTION INTRAVENOUS PRN
Status: DISCONTINUED | OUTPATIENT
Start: 2024-02-04 | End: 2024-02-07 | Stop reason: HOSPADM

## 2024-02-04 RX ORDER — ROSUVASTATIN CALCIUM 20 MG/1
20 TABLET, COATED ORAL DAILY
Status: DISCONTINUED | OUTPATIENT
Start: 2024-02-04 | End: 2024-02-04

## 2024-02-04 RX ORDER — GABAPENTIN 600 MG/1
600 TABLET ORAL NIGHTLY
Status: DISCONTINUED | OUTPATIENT
Start: 2024-02-04 | End: 2024-02-07 | Stop reason: HOSPADM

## 2024-02-04 RX ORDER — POTASSIUM CHLORIDE 20 MEQ/1
40 TABLET, EXTENDED RELEASE ORAL PRN
Status: DISCONTINUED | OUTPATIENT
Start: 2024-02-04 | End: 2024-02-04

## 2024-02-04 RX ORDER — PREDNISONE 20 MG/1
40 TABLET ORAL DAILY
Status: DISCONTINUED | OUTPATIENT
Start: 2024-02-04 | End: 2024-02-07 | Stop reason: HOSPADM

## 2024-02-04 RX ORDER — AMLODIPINE BESYLATE 5 MG/1
5 TABLET ORAL DAILY
Status: DISCONTINUED | OUTPATIENT
Start: 2024-02-04 | End: 2024-02-07 | Stop reason: HOSPADM

## 2024-02-04 RX ORDER — SODIUM CHLORIDE 0.9 % (FLUSH) 0.9 %
5-40 SYRINGE (ML) INJECTION PRN
Status: DISCONTINUED | OUTPATIENT
Start: 2024-02-04 | End: 2024-02-07 | Stop reason: HOSPADM

## 2024-02-04 RX ORDER — ALBUTEROL SULFATE 2.5 MG/3ML
2.5 SOLUTION RESPIRATORY (INHALATION)
Status: DISCONTINUED | OUTPATIENT
Start: 2024-02-04 | End: 2024-02-04

## 2024-02-04 RX ORDER — ACETAMINOPHEN 325 MG/1
650 TABLET ORAL ONCE
Status: COMPLETED | OUTPATIENT
Start: 2024-02-04 | End: 2024-02-04

## 2024-02-04 RX ORDER — IPRATROPIUM BROMIDE AND ALBUTEROL SULFATE 2.5; .5 MG/3ML; MG/3ML
1 SOLUTION RESPIRATORY (INHALATION)
Status: DISCONTINUED | OUTPATIENT
Start: 2024-02-04 | End: 2024-02-07 | Stop reason: HOSPADM

## 2024-02-04 RX ORDER — ACETAMINOPHEN 325 MG/1
650 TABLET ORAL EVERY 6 HOURS PRN
Status: DISCONTINUED | OUTPATIENT
Start: 2024-02-04 | End: 2024-02-07 | Stop reason: HOSPADM

## 2024-02-04 RX ORDER — ACETAMINOPHEN 650 MG/1
650 SUPPOSITORY RECTAL EVERY 6 HOURS PRN
Status: DISCONTINUED | OUTPATIENT
Start: 2024-02-04 | End: 2024-02-07 | Stop reason: HOSPADM

## 2024-02-04 RX ORDER — ROSUVASTATIN CALCIUM 20 MG/1
20 TABLET, COATED ORAL NIGHTLY
Status: DISCONTINUED | OUTPATIENT
Start: 2024-02-04 | End: 2024-02-07 | Stop reason: HOSPADM

## 2024-02-04 RX ORDER — PANTOPRAZOLE SODIUM 40 MG/1
40 TABLET, DELAYED RELEASE ORAL
Status: DISCONTINUED | OUTPATIENT
Start: 2024-02-04 | End: 2024-02-07 | Stop reason: HOSPADM

## 2024-02-04 RX ORDER — GABAPENTIN 300 MG/1
600 CAPSULE ORAL ONCE
Status: COMPLETED | OUTPATIENT
Start: 2024-02-04 | End: 2024-02-04

## 2024-02-04 RX ORDER — CLOPIDOGREL BISULFATE 75 MG/1
75 TABLET ORAL DAILY
Status: DISCONTINUED | OUTPATIENT
Start: 2024-02-04 | End: 2024-02-07 | Stop reason: HOSPADM

## 2024-02-04 RX ORDER — ENOXAPARIN SODIUM 100 MG/ML
40 INJECTION SUBCUTANEOUS DAILY
Status: DISCONTINUED | OUTPATIENT
Start: 2024-02-04 | End: 2024-02-04 | Stop reason: DRUGHIGH

## 2024-02-04 RX ORDER — TRAMADOL HYDROCHLORIDE 50 MG/1
50 TABLET ORAL ONCE
Status: COMPLETED | OUTPATIENT
Start: 2024-02-04 | End: 2024-02-04

## 2024-02-04 RX ADMIN — AMLODIPINE BESYLATE 5 MG: 5 TABLET ORAL at 08:51

## 2024-02-04 RX ADMIN — ASPIRIN 81 MG: 81 TABLET, COATED ORAL at 08:51

## 2024-02-04 RX ADMIN — ACETAMINOPHEN 650 MG: 325 TABLET ORAL at 16:15

## 2024-02-04 RX ADMIN — IPRATROPIUM BROMIDE AND ALBUTEROL SULFATE 1 DOSE: 2.5; .5 SOLUTION RESPIRATORY (INHALATION) at 19:52

## 2024-02-04 RX ADMIN — ACETAMINOPHEN 650 MG: 325 TABLET ORAL at 03:29

## 2024-02-04 RX ADMIN — SODIUM CHLORIDE, PRESERVATIVE FREE 10 ML: 5 INJECTION INTRAVENOUS at 08:50

## 2024-02-04 RX ADMIN — ROSUVASTATIN CALCIUM 20 MG: 20 TABLET, FILM COATED ORAL at 19:43

## 2024-02-04 RX ADMIN — ALBUTEROL SULFATE 2.5 MG: 2.5 SOLUTION RESPIRATORY (INHALATION) at 07:18

## 2024-02-04 RX ADMIN — SODIUM CHLORIDE, PRESERVATIVE FREE 10 ML: 5 INJECTION INTRAVENOUS at 19:43

## 2024-02-04 RX ADMIN — GABAPENTIN 600 MG: 300 CAPSULE ORAL at 03:29

## 2024-02-04 RX ADMIN — METOPROLOL TARTRATE 25 MG: 25 TABLET, FILM COATED ORAL at 08:51

## 2024-02-04 RX ADMIN — CLOPIDOGREL BISULFATE 75 MG: 75 TABLET ORAL at 08:51

## 2024-02-04 RX ADMIN — ALBUTEROL SULFATE 2.5 MG: 2.5 SOLUTION RESPIRATORY (INHALATION) at 00:19

## 2024-02-04 RX ADMIN — PREDNISONE 40 MG: 20 TABLET ORAL at 08:51

## 2024-02-04 RX ADMIN — NYSTATIN 500000 UNITS: 100000 SUSPENSION ORAL at 13:53

## 2024-02-04 RX ADMIN — TIOTROPIUM BROMIDE AND OLODATEROL 2 PUFF: 3.124; 2.736 SPRAY, METERED RESPIRATORY (INHALATION) at 07:18

## 2024-02-04 RX ADMIN — ALBUTEROL SULFATE 2.5 MG: 2.5 SOLUTION RESPIRATORY (INHALATION) at 00:18

## 2024-02-04 RX ADMIN — IPRATROPIUM BROMIDE AND ALBUTEROL SULFATE 1 DOSE: 2.5; .5 SOLUTION RESPIRATORY (INHALATION) at 15:55

## 2024-02-04 RX ADMIN — ENOXAPARIN SODIUM 30 MG: 100 INJECTION SUBCUTANEOUS at 08:51

## 2024-02-04 RX ADMIN — IPRATROPIUM BROMIDE AND ALBUTEROL SULFATE 1 DOSE: 2.5; .5 SOLUTION RESPIRATORY (INHALATION) at 10:55

## 2024-02-04 RX ADMIN — NYSTATIN 500000 UNITS: 100000 SUSPENSION ORAL at 19:43

## 2024-02-04 RX ADMIN — GABAPENTIN 600 MG: 600 TABLET, FILM COATED ORAL at 19:43

## 2024-02-04 RX ADMIN — PANTOPRAZOLE SODIUM 40 MG: 40 TABLET, DELAYED RELEASE ORAL at 08:53

## 2024-02-04 RX ADMIN — TRAMADOL HYDROCHLORIDE 50 MG: 50 TABLET, COATED ORAL at 03:29

## 2024-02-04 ASSESSMENT — PAIN DESCRIPTION - LOCATION: LOCATION: HEAD

## 2024-02-04 ASSESSMENT — PAIN DESCRIPTION - DESCRIPTORS: DESCRIPTORS: ACHING

## 2024-02-04 ASSESSMENT — PAIN SCALES - GENERAL
PAINLEVEL_OUTOF10: 0
PAINLEVEL_OUTOF10: 5
PAINLEVEL_OUTOF10: 0

## 2024-02-04 ASSESSMENT — PAIN DESCRIPTION - ORIENTATION: ORIENTATION: ANTERIOR

## 2024-02-04 NOTE — PROGRESS NOTES
Spoke with patient about possible admission for COPD ex. Elevated  trops noted concerns as patient had NSTEMI last week did not get cath, had recent cardio apt this week and they mentioned she will need stents placed. Reports chest pain today/ pressure that caused her to have panic attack and become SOB. Has known hx of copd smokes 1 pack daily. EKG intrep by my artifact possible labored breathing NSR no St elevation. Trops first one 26 jumped to 44 awaiting third however will need further eval by cardiology. [facility] has no open bed at this time will place hold orders as patient awaits transfer.

## 2024-02-04 NOTE — PROGRESS NOTES
4 Eyes Skin Assessment     NAME:  Kate Minor  YOB: 1960  MEDICAL RECORD NUMBER:  [id number]    The patient is being assessed for  a    I agree that at least one RN has performed a thorough Head to Toe Skin Assessment on the patient. ALL assessment sites listed below have been assessed.      Areas assessed by both nurses:    Head, Face, Ears, Shoulders, Back, Chest, Arms, Elbows, Hands, Sacrum. Buttock, Coccyx, Ischium, and Legs. Feet and Heels        Does the Patient have a Wound? No noted wound(s)       Navid Prevention initiated by RN: Yes  Wound Care Orders initiated by RN: no    Pressure Injury (Stage 3,4, Unstageable, DTI, NWPT, and Complex wounds) if present, place Wound referral order by RN under : No    New Ostomies, if present place, Ostomy referral order under : No     Nurse 1 eSignature: Electronically signed by Ching [place] RN on 2/4/24 at 4:38 AM EST    **SHARE this note so that the co-signing nurse can place an eSignature**    Nurse 2 eSignature: Electronically signed by Kemi Hollingsworth RN on 2/4/24 at 4:46 AM EST

## 2024-02-04 NOTE — H&P
Symmetric chest movement while on RA.   C/V -S1/S2 auscultated. No peripheral edema.   GI -Abdomen is soft non distended and without significant TTP. + BS. No masses or guarding.  Rectal exam deferred. No HSM   -No CVA/ flank tenderness.   MS -No gross joint deformities.  SKIN -Normal coloration, warm, dry.  NEURO-Cranial nerves appear grossly intact, normal speech, no lateralizing weakness.  PSYC-Awake, alert, oriented x 4- person, place, time, situation,  Appropriate affect.    Past Medical History:      Past Medical History:   Diagnosis Date    Salas's esophagus     Elevated troponin 01/23/2024    Emphysema of lung (HCC)     H/O Doppler ultrasound carotid 05/16/2023    Carotid Moderate (50-69%) disease of the Right proximal Internal carotid artery. Mild (0-49%) disease of the Left mid Internal carotid artery.    H/O echocardiogram 05/16/2023    Echo EF 55-60%. Doppler evaluation reveals mild aortic, mild to moderate mitral, and mild tricuspid regurgitation. Right ventricular systolic pressure of 45 mmHg consistent with mild to moderate pulmonary hypertension    H/O exercise stress test 01/05/2016    Good exercise capacity. EF 70% Normal test.    History of Holter monitoring 05/03/2023    Normal sinus rhythm with frequent complex supraventricular ectopy and isolated nonsustained 7 beat run of atrial tachycardia.    History of nuclear stress test 05/16/2023    NM Normal isotope uptake following exercise and at rest. There is no evidence of exercise induced ischemia.    HLD (hyperlipidemia)     Hypertension     NSTEMI (non-ST elevated myocardial infarction) ([facility]) 01/23/2024 1/23/2024    Pulmonary emphysema (HCC)     TIA (transient ischemic attack)        Past Surgical  History:    has a past surgical history that includes Appendectomy; Colonoscopy; Colonoscopy (N/A, 6/8/2023); Upper gastrointestinal endoscopy (N/A, 6/8/2023); and Cardiac procedure (N/A, 1/24/2024).    Social History:    [facility]: Reviewed and

## 2024-02-04 NOTE — ED PROVIDER NOTES
wheezing.    Initial troponin was 26    She admitted for some further respiratory care.    She has received IV Solu-Medrol as well as breathing treatments by EMS, we also gave some breathing treatments in the emergency department.  She is currently off supplemental oxygen with a normal blood gas at this time.    Her repeat troponin did increase to 44.  Did obtain a 345.    I consulted and spoke with cardiology and spoke with Dr. Ye (cardiology).  Does not feel that she needs transfer to [facility] or any other cardiology intervention at this point.  She recently had a cath with no stenting.    She be admitted for COPD exacerbation.       Amount and/or Complexity of Data Reviewed  Decide to obtain previous medical records or to obtain history from someone other than the patient: yes        -  Patient seen and evaluated in the emergency department.  -  Triage and nursing notes reviewed and incorporated.  -  Old chart records reviewed and incorporated.  -  Work-up included:  See above  -  Results discussed with patient.      REASSESSMENT        2/4/24 12:00 AM EST I have completed a reassessment     CRITICAL CARE TIME     Total critical care time today provided was 35 minutes. This excludes seperately billable procedure. Critical care time provided for copd exacerbation, respiratory failure that required close evaluation and/or intervention with concern for patient decompensation.     This excludes seperately billable procedures and family discussion time. Critical care time provided for obtaining history, conducting a physical exam, performing and monitoring interventions, ordering, collecting and interpreting tests, and establishing medical decision-making.  There was a potential for life/limb threatening pathology requiring close evaluation and intervention with concern for patient decompensation.    CONSULTS:  None    PROCEDURES:  None performed unless otherwise noted below

## 2024-02-04 NOTE — PROGRESS NOTES
V2.0    [facility] Progress Note      Name:  Kate Minor /Age/Sex: 1960  (63 y.o. female)   MRN & CSN:  [id number] & [id number] Encounter Date/Time: 2024 7:34 AM EST   Location:   PCP: Sharyn Delatorre, APRN - CNP     Attending:Juan Daniel Lara MD       Hospital Day: 2    Assessment and Recommendations   Kate Minor is a 63 y.o. female  who presents with Chest pain      Plan:   Acute on chronic COPD exacerbation, continue DuoNebs, Mucinex, p.o. steroids.  Patient currently on 2 L nasal cannula, when she sleeps her sats do drop into the low 80s upper 70s.  Respiratory viral panel was negative.    Chest pain with elevation in troponin, troponin HS, 26, 44, 45, 43 trending downward.  Chest pain has resolved, patient states she did not have chest pain until she started to panic because she was getting short of breath because of COPD.  EKG shows sinus rhythm no ST elevation.  Will get EKG with any further chest pain.  Patient had NSTEMI in 2024 she underwent left heart catheterization which showed CAD, no intervention was required, continue Plavix, aspirin and statin.  Cardiology was contacted from the emergency department and stated it was okay for patient to stay in [place].  Only recommendation was continue current treatment and follow-up outpatient.    Hyperlipidemia, continue statin    Hypertension, continue Norvasc 5 mg daily.    Leukocytosis, WBC 14.6, continue to monitor, patient is on steroids    Protein calorie malnutrition, BMI is 14.65.          Diet ADULT DIET; Regular; Low Fat/Low Chol/High Fiber/YADIRA; No Caffeine  ADULT ORAL NUTRITION SUPPLEMENT; Breakfast, AM Snack, Lunch, PM Snack; Standard High Calorie/High Protein Oral Supplement   DVT Prophylaxis [x] Lovenox, []  Heparin, [] SCDs, [] Ambulation,  [] Eliquis, [] Xarelto  [] Coumadin   Code Status Full Code   Disposition From: Home  Expected Disposition: Home  Estimated Date of Discharge: 24 to 48 hours  Patient

## 2024-02-04 NOTE — PLAN OF CARE
Problem: Pain  Goal: Verbalizes/displays adequate comfort level or baseline comfort level  Outcome: Progressing     Problem: Safety - Adult  Goal: Free from fall injury  Outcome: Progressing     Problem: ABCDS Injury Assessment  Goal: Absence of physical injury  Outcome: Progressing     Problem: Respiratory - Adult  Goal: Achieves optimal ventilation and oxygenation  Outcome: Progressing     Problem: Cardiovascular - Adult  Goal: Maintains optimal cardiac output and hemodynamic stability  Outcome: Progressing  Goal: Absence of cardiac dysrhythmias or at baseline  Outcome: Progressing     Problem: Musculoskeletal - Adult  Goal: Return mobility to safest level of function  Outcome: Progressing     Problem: Infection - Adult  Goal: Absence of infection at discharge  Outcome: Progressing     Problem: Discharge Planning  Goal: Discharge to home or other facility with appropriate resources  Outcome: Progressing  Flowsheets (Taken 2/4/2024 9967)  Discharge to home or other facility with appropriate resources:   Identify discharge learning needs (meds, wound care, etc)   Identify barriers to discharge with patient and caregiver   Refer to discharge planning if patient needs post-hospital services based on physician order or complex needs related to functional status, cognitive ability or social support system

## 2024-02-04 NOTE — PROGRESS NOTES
LOVENOX PROPHYLAXIS EVALUATION  (Populations not addressed in this protocol: trauma, obstetrics, or COVID-19)    Wt Readings from Last 3 Encounters:   02/04/24 31.8 kg (70 lb 1.6 oz)   02/02/24 33.1 kg (73 lb)   01/25/24 31.9 kg (70 lb 4.8 oz)       Estimated Creatinine Clearance: 41 mL/min (based on SCr of 0.7 mg/dL).  Recent Labs     02/04/24  0025   BUN 17   CREATININE 0.7      HGB 15.3   HCT 47.4*       Weight Range: 50.9 kg and below    CRCL = 30 or greater    50.9 kg   and below     .9  kg   101-150.9 kg   151-174.9  kg   175 kg  or greater     30mg subq  daily     40mg subq daily    (or 30mg subq BID orthopedic)     30mg subq  BID   40mg subq  BID   60mg subq BID       Per P/T protocol for appropriate subq anticoagulation by weight and CRCL change to:    Enoxparin 30mg subq daily      Delvis Concepcion [facility]  7:59 AM  02/04/24

## 2024-02-04 NOTE — FLOWSHEET NOTE
Patient oxygen saturation noted to drop to 71% while sleeping on room air with a good pleth.  Patient placed on 3L/NC at this time.  Will continue to monitor.

## 2024-02-05 PROBLEM — J44.1 COPD EXACERBATION (HCC): Status: ACTIVE | Noted: 2024-02-05

## 2024-02-05 LAB
ALBUMIN SERPL-MCNC: 3.3 GM/DL (ref 3.4–5)
ALP BLD-CCNC: 79 IU/L (ref 40–129)
ALT SERPL-CCNC: 22 U/L (ref 10–40)
ANION GAP SERPL CALCULATED.3IONS-SCNC: 14 MMOL/L (ref 7–16)
AST SERPL-CCNC: 15 IU/L (ref 15–37)
BILIRUB SERPL-MCNC: 0.2 MG/DL (ref 0–1)
BUN SERPL-MCNC: 19 MG/DL (ref 6–23)
CALCIUM SERPL-MCNC: 8 MG/DL (ref 8.3–10.6)
CHLORIDE BLD-SCNC: 102 MMOL/L (ref 99–110)
CO2: 21 MMOL/L (ref 21–32)
CREAT SERPL-MCNC: 0.7 MG/DL (ref 0.6–1.1)
EKG ATRIAL RATE: 62 BPM
EKG ATRIAL RATE: 68 BPM
EKG ATRIAL RATE: 86 BPM
EKG DIAGNOSIS: NORMAL
EKG P AXIS: 79 DEGREES
EKG P AXIS: 81 DEGREES
EKG P AXIS: 88 DEGREES
EKG P-R INTERVAL: 108 MS
EKG P-R INTERVAL: 110 MS
EKG P-R INTERVAL: 112 MS
EKG Q-T INTERVAL: 390 MS
EKG Q-T INTERVAL: 442 MS
EKG Q-T INTERVAL: 484 MS
EKG QRS DURATION: 64 MS
EKG QTC CALCULATION (BAZETT): 466 MS
EKG QTC CALCULATION (BAZETT): 469 MS
EKG QTC CALCULATION (BAZETT): 491 MS
EKG R AXIS: 72 DEGREES
EKG R AXIS: 74 DEGREES
EKG R AXIS: 79 DEGREES
EKG T AXIS: 79 DEGREES
EKG T AXIS: 89 DEGREES
EKG T AXIS: 90 DEGREES
EKG VENTRICULAR RATE: 62 BPM
EKG VENTRICULAR RATE: 68 BPM
EKG VENTRICULAR RATE: 86 BPM
ESTIMATED AVERAGE GLUCOSE: 111 MG/DL
GFR SERPL CREATININE-BSD FRML MDRD: >60 ML/MIN/1.73M2
GLUCOSE SERPL-MCNC: 121 MG/DL (ref 70–99)
HBA1C MFR BLD: 5.5 % (ref 4.2–6.3)
HCT VFR BLD CALC: 42.6 % (ref 37–47)
HEMOGLOBIN: 13.8 GM/DL (ref 12.5–16)
MAGNESIUM: 2.1 MG/DL (ref 1.8–2.4)
MCH RBC QN AUTO: 33.2 PG (ref 27–31)
MCHC RBC AUTO-ENTMCNC: 32.4 % (ref 32–36)
MCV RBC AUTO: 102.4 FL (ref 78–100)
PDW BLD-RTO: 12.7 % (ref 11.7–14.9)
PHOSPHORUS: 3.9 MG/DL (ref 2.5–4.9)
PLATELET # BLD: 320 K/CU MM (ref 140–440)
PMV BLD AUTO: 9.6 FL (ref 7.5–11.1)
POTASSIUM SERPL-SCNC: 3.8 MMOL/L (ref 3.5–5.1)
RBC # BLD: 4.16 M/CU MM (ref 4.2–5.4)
SODIUM BLD-SCNC: 137 MMOL/L (ref 135–145)
TOTAL PROTEIN: 5.6 GM/DL (ref 6.4–8.2)
WBC # BLD: 15.4 K/CU MM (ref 4–10.5)

## 2024-02-05 PROCEDURE — 6370000000 HC RX 637 (ALT 250 FOR IP): Performed by: NURSE PRACTITIONER

## 2024-02-05 PROCEDURE — 80053 COMPREHEN METABOLIC PANEL: CPT

## 2024-02-05 PROCEDURE — 2700000000 HC OXYGEN THERAPY PER DAY

## 2024-02-05 PROCEDURE — 83036 HEMOGLOBIN GLYCOSYLATED A1C: CPT

## 2024-02-05 PROCEDURE — 2580000003 HC RX 258: Performed by: NURSE PRACTITIONER

## 2024-02-05 PROCEDURE — 6370000000 HC RX 637 (ALT 250 FOR IP): Performed by: FAMILY MEDICINE

## 2024-02-05 PROCEDURE — 84100 ASSAY OF PHOSPHORUS: CPT

## 2024-02-05 PROCEDURE — 94640 AIRWAY INHALATION TREATMENT: CPT

## 2024-02-05 PROCEDURE — 94761 N-INVAS EAR/PLS OXIMETRY MLT: CPT

## 2024-02-05 PROCEDURE — 85027 COMPLETE CBC AUTOMATED: CPT

## 2024-02-05 PROCEDURE — 83735 ASSAY OF MAGNESIUM: CPT

## 2024-02-05 PROCEDURE — 93010 ELECTROCARDIOGRAM REPORT: CPT | Performed by: INTERNAL MEDICINE

## 2024-02-05 PROCEDURE — 36415 COLL VENOUS BLD VENIPUNCTURE: CPT

## 2024-02-05 PROCEDURE — 6360000002 HC RX W HCPCS: Performed by: NURSE PRACTITIONER

## 2024-02-05 PROCEDURE — 2140000000 HC CCU INTERMEDIATE R&B

## 2024-02-05 RX ADMIN — METOPROLOL TARTRATE 25 MG: 25 TABLET, FILM COATED ORAL at 09:21

## 2024-02-05 RX ADMIN — IPRATROPIUM BROMIDE AND ALBUTEROL SULFATE 1 DOSE: 2.5; .5 SOLUTION RESPIRATORY (INHALATION) at 05:41

## 2024-02-05 RX ADMIN — IPRATROPIUM BROMIDE AND ALBUTEROL SULFATE 1 DOSE: 2.5; .5 SOLUTION RESPIRATORY (INHALATION) at 19:35

## 2024-02-05 RX ADMIN — NYSTATIN 500000 UNITS: 100000 SUSPENSION ORAL at 17:04

## 2024-02-05 RX ADMIN — AMLODIPINE BESYLATE 5 MG: 5 TABLET ORAL at 09:21

## 2024-02-05 RX ADMIN — NYSTATIN 500000 UNITS: 100000 SUSPENSION ORAL at 14:01

## 2024-02-05 RX ADMIN — PREDNISONE 40 MG: 20 TABLET ORAL at 09:21

## 2024-02-05 RX ADMIN — CLOPIDOGREL BISULFATE 75 MG: 75 TABLET ORAL at 09:21

## 2024-02-05 RX ADMIN — PANTOPRAZOLE SODIUM 40 MG: 40 TABLET, DELAYED RELEASE ORAL at 04:38

## 2024-02-05 RX ADMIN — SODIUM CHLORIDE, PRESERVATIVE FREE 10 ML: 5 INJECTION INTRAVENOUS at 09:21

## 2024-02-05 RX ADMIN — IPRATROPIUM BROMIDE AND ALBUTEROL SULFATE 1 DOSE: 2.5; .5 SOLUTION RESPIRATORY (INHALATION) at 15:15

## 2024-02-05 RX ADMIN — SODIUM CHLORIDE, PRESERVATIVE FREE 10 ML: 5 INJECTION INTRAVENOUS at 21:06

## 2024-02-05 RX ADMIN — GABAPENTIN 600 MG: 600 TABLET, FILM COATED ORAL at 21:06

## 2024-02-05 RX ADMIN — NYSTATIN 500000 UNITS: 100000 SUSPENSION ORAL at 21:07

## 2024-02-05 RX ADMIN — ENOXAPARIN SODIUM 30 MG: 100 INJECTION SUBCUTANEOUS at 09:20

## 2024-02-05 RX ADMIN — NYSTATIN 500000 UNITS: 100000 SUSPENSION ORAL at 09:20

## 2024-02-05 RX ADMIN — ASPIRIN 81 MG: 81 TABLET, COATED ORAL at 09:21

## 2024-02-05 RX ADMIN — BENZOCAINE 6 MG-MENTHOL 10 MG LOZENGES 1 LOZENGE: at 21:25

## 2024-02-05 RX ADMIN — IPRATROPIUM BROMIDE AND ALBUTEROL SULFATE 1 DOSE: 2.5; .5 SOLUTION RESPIRATORY (INHALATION) at 10:57

## 2024-02-05 RX ADMIN — ROSUVASTATIN CALCIUM 20 MG: 20 TABLET, FILM COATED ORAL at 21:06

## 2024-02-05 RX ADMIN — METOPROLOL TARTRATE 25 MG: 25 TABLET, FILM COATED ORAL at 21:06

## 2024-02-05 ASSESSMENT — PAIN SCALES - GENERAL: PAINLEVEL_OUTOF10: 0

## 2024-02-05 NOTE — PROGRESS NOTES
Comprehensive Nutrition Assessment    Type and Reason for Visit:  Initial (Low BMI)    Nutrition Recommendations/Plan:   Continue to provide cardiac diet-if po intake decreases may benefit from diet liberalization  If difficulty chewing/swallowing persists may benefit from SLP eval  Continue to provide standard high calorie, high protein oral supplement TID  Encourage intake and selection of alternatives at meals to optimize po intake  Will monitor weights, labs, po intakes and POC     Malnutrition Assessment:  Malnutrition Status:  Severe malnutrition (02/05/24 1501)    Context:  Chronic Illness     Findings of the 6 clinical characteristics of malnutrition:  Energy Intake:  Mild decrease in energy intake (Comment)  Weight Loss:   (no change in weight since 3/2023-chronically underweight)     Body Fat Loss:  Severe body fat loss Fat Overlying Ribs, Triceps   Muscle Mass Loss:  Severe muscle mass loss Clavicles (pectoralis & deltoids), Hand (interosseous) (mild to temples)  Fluid Accumulation:  Unable to assess     Strength:  Not Performed    Nutrition Assessment:    Pt admitted for chest pain, elevated troponin, COPD Exacerbation. Hx includes NSTEMI, anxiety, pulmonary emphysema, HTN, HLD, COPD, TIA, chronically underweight, Salas's esophagus. Diet order Cardiac with no caffeine-pt is requesting regular Pepsi at this time despite diet order. Pt also currently receiving standard high calorie, high protein oral supplement TID and pt reports she used to drink these years ago and cannot remember why she stopped but she is willing to trial them again. Noted intakes of %, however, pt reports she did not get her tray earlier today d/t she feel asleep. At this time pt does meet criteria for severe malnutrition based on areas of physical assessment and at times decrease in appetite and need for increased intake d/t PMHx. Pt does also report that at times she feels that she chokes on liquids and at times feels

## 2024-02-05 NOTE — PLAN OF CARE
Problem: Pain  Goal: Verbalizes/displays adequate comfort level or baseline comfort level  2/4/2024 2154 by Ching Torres RN  Outcome: Progressing  2/4/2024 0940 by Esme Odom RN  Outcome: Progressing     Problem: Safety - Adult  Goal: Free from fall injury  2/4/2024 2154 by Ching Torres RN  Outcome: Progressing  2/4/2024 0940 by Esme Odom RN  Outcome: Progressing     Problem: ABCDS Injury Assessment  Goal: Absence of physical injury  2/4/2024 2154 by Ching Torres RN  Outcome: Progressing  2/4/2024 0940 by Esme Odom RN  Outcome: Progressing     Problem: Respiratory - Adult  Goal: Achieves optimal ventilation and oxygenation  2/4/2024 2154 by Ching Torres RN  Outcome: Progressing  2/4/2024 0940 by Esme Odom RN  Outcome: Progressing     Problem: Cardiovascular - Adult  Goal: Maintains optimal cardiac output and hemodynamic stability  2/4/2024 2154 by Ching Torres RN  Outcome: Progressing  2/4/2024 0940 by Esme Odom RN  Outcome: Progressing  Goal: Absence of cardiac dysrhythmias or at baseline  2/4/2024 2154 by Ching Torres RN  Outcome: Progressing  2/4/2024 0940 by Esme Odom RN  Outcome: Progressing     Problem: Infection - Adult  Goal: Absence of infection at discharge  2/4/2024 2154 by Ching Torres RN  Outcome: Progressing  2/4/2024 0940 by Esme Odom RN  Outcome: Progressing     Problem: Discharge Planning  Goal: Discharge to home or other facility with appropriate resources  2/4/2024 2154 by Ching Torres RN  Outcome: Progressing  2/4/2024 0940 by Esme Odom RN  Outcome: Progressing

## 2024-02-05 NOTE — PROGRESS NOTES
V2.0    [facility] [place] Note      Name:  Kate Minor /Age/Sex: 1960  (63 y.o. female)   MRN & CSN:  [id number] & [id number] Encounter Date/Time: 2024 7:34 AM EST   Location:   PCP: Sharyn Delatorre, APRN - CNP     Attending:Juan Daniel Lara MD       Hospital Day: 3    Assessment and Recommendations   Kate Minor is a 63 y.o. female  who presents with Chest pain      Plan:   Acute on chronic COPD exacerbation, continue DuoNebs, Mucinex, p.o. steroids.  Patient currently on 2 continue to wean 02 as tolerated Respiratory viral panel was negative.    Chest pain with elevation in troponin, troponin HS, 26, 44, 45, 43 trending downward.  Chest pain has resolved, patient states she did not have chest pain until she started to panic because she was getting short of breath because of COPD.  EKG shows sinus rhythm no ST elevation.  Will get EKG with any further chest pain.  Patient had NSTEMI in 2024 she underwent left heart catheterization which showed CAD, no intervention was required, continue Plavix, aspirin and statin.  Cardiology was contacted from the emergency department and stated it was okay for patient to stay in [place].  Only recommendation was continue current treatment and follow-up outpatient.    Hyperlipidemia, continue statin    Hypertension, continue Norvasc 5 mg daily.    Leukocytosis, WBC 15., continue to monitor, patient is on steroids    Protein calorie malnutrition, BMI is 14.65.          Diet ADULT DIET; Regular; Low Fat/Low Chol/High Fiber/YADIRA; No Caffeine  ADULT ORAL NUTRITION SUPPLEMENT; Breakfast, AM Snack, Lunch, PM Snack; Standard High Calorie/High Protein Oral Supplement   DVT Prophylaxis [x] Lovenox, []  Heparin, [] SCDs, [] Ambulation,  [] Eliquis, [] Xarelto  [] Coumadin   Code Status Full Code   Disposition From: Home  Expected Disposition: Home  Estimated Date of Discharge: 24 to 48 hours  Patient requires continued admission due to COPD exacerbation

## 2024-02-05 NOTE — CARE COORDINATION
CM into see pt regarding discharge planning following discussion in IDR. Pt lives with son and his girlfriend. Pt has been actively pursuing senior housing and felt she had an opportunity and then they gave the apartment to someone else. Pt does not have a phone and must rely on her son for use of a phone. Pt shared that she sleeps on a couch. The Apartment is on the third floor and she has difficulty going up the 37 stairs.   Plan at this time is for her to return to her sons apartment and to continue to pursue her own place. Resources will be provided. SAM  
treatment preferences, and shares the quality data associated with the providers?  Yes     CM met with the patient to initiate discharge planning.  Patient lives with her son and her son's significant other in their apartment, has medical coverage & PCP, stated that she is independent with ADLs, and is not an active  (son or son's significant other provide transportation as needed).  Patient stated that she does not require the use of any assistive devices or home oxygen.  Patient uses an inhaler and nebulizer machine at home.  Patient plans to return home upon discharge and is unable to identify any needs at this time.  CM available if needs arise.

## 2024-02-06 ENCOUNTER — PARAMEDICINE (OUTPATIENT)
Dept: OTHER | Age: 64
End: 2024-02-06

## 2024-02-06 LAB
ANION GAP SERPL CALCULATED.3IONS-SCNC: 9 MMOL/L (ref 7–16)
BASOPHILS ABSOLUTE: 0 K/CU MM
BASOPHILS RELATIVE PERCENT: 0.2 % (ref 0–1)
BUN SERPL-MCNC: 22 MG/DL (ref 6–23)
CALCIUM SERPL-MCNC: 8.5 MG/DL (ref 8.3–10.6)
CHLORIDE BLD-SCNC: 101 MMOL/L (ref 99–110)
CO2: 29 MMOL/L (ref 21–32)
CREAT SERPL-MCNC: 0.7 MG/DL (ref 0.6–1.1)
DIFFERENTIAL TYPE: ABNORMAL
EOSINOPHILS ABSOLUTE: 0 K/CU MM
EOSINOPHILS RELATIVE PERCENT: 0.2 % (ref 0–3)
GFR SERPL CREATININE-BSD FRML MDRD: >60 ML/MIN/1.73M2
GLUCOSE SERPL-MCNC: 102 MG/DL (ref 70–99)
HCT VFR BLD CALC: 45.8 % (ref 37–47)
HEMOGLOBIN: 14.7 GM/DL (ref 12.5–16)
IMMATURE NEUTROPHIL %: 0.2 % (ref 0–0.43)
LYMPHOCYTES ABSOLUTE: 2.6 K/CU MM
LYMPHOCYTES RELATIVE PERCENT: 19.2 % (ref 24–44)
MCH RBC QN AUTO: 33 PG (ref 27–31)
MCHC RBC AUTO-ENTMCNC: 32.1 % (ref 32–36)
MCV RBC AUTO: 102.7 FL (ref 78–100)
MONOCYTES ABSOLUTE: 0.6 K/CU MM
MONOCYTES RELATIVE PERCENT: 4.3 % (ref 0–4)
PDW BLD-RTO: 12.4 % (ref 11.7–14.9)
PLATELET # BLD: 349 K/CU MM (ref 140–440)
PMV BLD AUTO: 9.6 FL (ref 7.5–11.1)
POTASSIUM SERPL-SCNC: 4.1 MMOL/L (ref 3.5–5.1)
RBC # BLD: 4.46 M/CU MM (ref 4.2–5.4)
SEGMENTED NEUTROPHILS ABSOLUTE COUNT: 10.1 K/CU MM
SEGMENTED NEUTROPHILS RELATIVE PERCENT: 75.9 % (ref 36–66)
SODIUM BLD-SCNC: 139 MMOL/L (ref 135–145)
TOTAL IMMATURE NEUTOROPHIL: 0.03 K/CU MM
WBC # BLD: 13.3 K/CU MM (ref 4–10.5)

## 2024-02-06 PROCEDURE — 2140000000 HC CCU INTERMEDIATE R&B

## 2024-02-06 PROCEDURE — 94150 VITAL CAPACITY TEST: CPT

## 2024-02-06 PROCEDURE — 85025 COMPLETE CBC W/AUTO DIFF WBC: CPT

## 2024-02-06 PROCEDURE — 6370000000 HC RX 637 (ALT 250 FOR IP): Performed by: PHYSICIAN ASSISTANT

## 2024-02-06 PROCEDURE — 6370000000 HC RX 637 (ALT 250 FOR IP): Performed by: NURSE PRACTITIONER

## 2024-02-06 PROCEDURE — 94761 N-INVAS EAR/PLS OXIMETRY MLT: CPT

## 2024-02-06 PROCEDURE — 36415 COLL VENOUS BLD VENIPUNCTURE: CPT

## 2024-02-06 PROCEDURE — 80048 BASIC METABOLIC PNL TOTAL CA: CPT

## 2024-02-06 PROCEDURE — 94640 AIRWAY INHALATION TREATMENT: CPT

## 2024-02-06 PROCEDURE — 2580000003 HC RX 258: Performed by: NURSE PRACTITIONER

## 2024-02-06 PROCEDURE — 6370000000 HC RX 637 (ALT 250 FOR IP): Performed by: FAMILY MEDICINE

## 2024-02-06 PROCEDURE — 6360000002 HC RX W HCPCS: Performed by: NURSE PRACTITIONER

## 2024-02-06 PROCEDURE — 1200000000 HC SEMI PRIVATE

## 2024-02-06 RX ORDER — AZITHROMYCIN 250 MG/1
500 TABLET, FILM COATED ORAL DAILY
Status: COMPLETED | OUTPATIENT
Start: 2024-02-06 | End: 2024-02-06

## 2024-02-06 RX ORDER — AZITHROMYCIN 250 MG/1
250 TABLET, FILM COATED ORAL DAILY
Status: DISCONTINUED | OUTPATIENT
Start: 2024-02-07 | End: 2024-02-07 | Stop reason: HOSPADM

## 2024-02-06 RX ADMIN — IPRATROPIUM BROMIDE AND ALBUTEROL SULFATE 1 DOSE: 2.5; .5 SOLUTION RESPIRATORY (INHALATION) at 19:32

## 2024-02-06 RX ADMIN — PANTOPRAZOLE SODIUM 40 MG: 40 TABLET, DELAYED RELEASE ORAL at 05:35

## 2024-02-06 RX ADMIN — ENOXAPARIN SODIUM 30 MG: 100 INJECTION SUBCUTANEOUS at 08:57

## 2024-02-06 RX ADMIN — NYSTATIN 500000 UNITS: 100000 SUSPENSION ORAL at 16:52

## 2024-02-06 RX ADMIN — AMLODIPINE BESYLATE 5 MG: 5 TABLET ORAL at 08:58

## 2024-02-06 RX ADMIN — ROSUVASTATIN CALCIUM 20 MG: 20 TABLET, FILM COATED ORAL at 20:10

## 2024-02-06 RX ADMIN — METOPROLOL TARTRATE 25 MG: 25 TABLET, FILM COATED ORAL at 20:10

## 2024-02-06 RX ADMIN — IPRATROPIUM BROMIDE AND ALBUTEROL SULFATE 1 DOSE: 2.5; .5 SOLUTION RESPIRATORY (INHALATION) at 07:32

## 2024-02-06 RX ADMIN — SODIUM CHLORIDE, PRESERVATIVE FREE 10 ML: 5 INJECTION INTRAVENOUS at 20:13

## 2024-02-06 RX ADMIN — IPRATROPIUM BROMIDE AND ALBUTEROL SULFATE 1 DOSE: 2.5; .5 SOLUTION RESPIRATORY (INHALATION) at 13:51

## 2024-02-06 RX ADMIN — NYSTATIN 500000 UNITS: 100000 SUSPENSION ORAL at 20:10

## 2024-02-06 RX ADMIN — AZITHROMYCIN DIHYDRATE 500 MG: 250 TABLET, FILM COATED ORAL at 16:51

## 2024-02-06 RX ADMIN — GABAPENTIN 600 MG: 600 TABLET, FILM COATED ORAL at 20:10

## 2024-02-06 RX ADMIN — ACETAMINOPHEN 650 MG: 325 TABLET ORAL at 20:10

## 2024-02-06 RX ADMIN — BENZOCAINE 6 MG-MENTHOL 10 MG LOZENGES 1 LOZENGE: at 20:10

## 2024-02-06 RX ADMIN — NYSTATIN 500000 UNITS: 100000 SUSPENSION ORAL at 13:29

## 2024-02-06 RX ADMIN — METOPROLOL TARTRATE 25 MG: 25 TABLET, FILM COATED ORAL at 08:57

## 2024-02-06 RX ADMIN — NYSTATIN 500000 UNITS: 100000 SUSPENSION ORAL at 08:57

## 2024-02-06 RX ADMIN — PREDNISONE 40 MG: 20 TABLET ORAL at 08:57

## 2024-02-06 RX ADMIN — CLOPIDOGREL BISULFATE 75 MG: 75 TABLET ORAL at 08:57

## 2024-02-06 RX ADMIN — ASPIRIN 81 MG: 81 TABLET, COATED ORAL at 08:57

## 2024-02-06 RX ADMIN — ACETAMINOPHEN 650 MG: 325 TABLET ORAL at 08:57

## 2024-02-06 ASSESSMENT — PAIN DESCRIPTION - LOCATION
LOCATION: HEAD
LOCATION: HEAD

## 2024-02-06 ASSESSMENT — PAIN SCALES - GENERAL
PAINLEVEL_OUTOF10: 3
PAINLEVEL_OUTOF10: 3
PAINLEVEL_OUTOF10: 0
PAINLEVEL_OUTOF10: 1

## 2024-02-06 ASSESSMENT — PAIN DESCRIPTION - ORIENTATION
ORIENTATION: MID
ORIENTATION: RIGHT;LEFT

## 2024-02-06 ASSESSMENT — PAIN DESCRIPTION - DIRECTION: RADIATING_TOWARDS: NO

## 2024-02-06 ASSESSMENT — PAIN DESCRIPTION - FREQUENCY
FREQUENCY: CONTINUOUS
FREQUENCY: INTERMITTENT

## 2024-02-06 ASSESSMENT — PAIN DESCRIPTION - DESCRIPTORS
DESCRIPTORS: ACHING
DESCRIPTORS: ACHING;DISCOMFORT

## 2024-02-06 ASSESSMENT — PAIN DESCRIPTION - ONSET
ONSET: GRADUAL
ONSET: ON-GOING

## 2024-02-06 ASSESSMENT — PAIN DESCRIPTION - PAIN TYPE
TYPE: ACUTE PAIN
TYPE: ACUTE PAIN

## 2024-02-06 NOTE — PROGRESS NOTES
**Patient has requested that notes be blocked so that her family is unable to view on [employer].**     Spoke with patient regarding discharge plan. Patient currently lives with patient's  son, significant other and her son,. Patient sleeps on the couch. Patient is trying to find a low income apartment. According to the patient she was on the list for an apartment and was first in line. She states that every time she checks on it they tell her they gave it to someone else.  Patient states she needs her own space. Patient states when she moved here from [place] she had all of her belongings in a storage unit in [place]. Patient states she has lost everything and only has a few pieces of clothing. Patient will need all household items when she finds an apartment.     Patient has also expressed interest to quit smoking. I advised her about [facility] and will provide information to her.     Will review some options with Kate today to see if she can get on a waiting list. Will continue to follow.

## 2024-02-06 NOTE — PROGRESS NOTES
V2.0  [facility] Hospitalist Progress Note      Name:  Kate Minor /Age/Sex: 1960  (63 y.o. female)   MRN & CSN:  [id number] & [id number] Encounter Date/Time: 2024 4:23 PM EST    Location:   PCP: Sharyn Delatorre APRN - CNP       Hospital Day: 4    Assessment and Plan:   Kate Minor is a 63 y.o. female who presents with Chest pain.    COPD exacerbation   Acute respiratory failure with hypoxia  -Required up to 2 LNC, weaned to room air  -CXR with bronchiolitis/bronchitis  -Continue DuoNebs, Mucinex, p.o. steroids  -Continues with dyspnea and cough productive of green sputum, will start azithromycin  -Respiratory hygiene  -did not qualify for home O2    Chest pain  Elevated troponin  -Troponin now trending downward, chest pain has resolved, patient felt chest pain due to panic  -EKG no acute ST changes  Recent NSTEMI 2024, underwent LHC which showed CAD, no intervention required  Continue Plavix, aspirin, statin  -Cardiology was consulted from ED, recommendation was for outpatient follow-up    Leukocytosis  -WBC 13.3, improving  -Start azithromycin in setting of COPD exacerbation and abnormal chest x-ray    HLD  -Continue home statin    Hypertension  -Continue home Norvasc    Severe PCM   - dietitian following    This patient was discussed with Dr. Mcrae. He was agreeable with the assessment and plan as dictated above.     Current Living situation: [facility]  Expected Disposition: same  Estimated D/C: 24    Diet ADULT DIET; Regular; Low Fat/Low Chol/High Fiber/YADIRA; No Caffeine  ADULT ORAL NUTRITION SUPPLEMENT; Breakfast, Lunch, Dinner; Standard High Calorie/High Protein Oral Supplement   DVT Prophylaxis [x] Lovenox, []  Heparin, [] SCDs, [] Ambulation,  [] Eliquis, [] Xarelto []Coumadin   Code Status Full Code   Disposition Patient requires continued admission due to dyspnea   Surrogate Decision Maker/ PHILIP Gifford     Personally reviewed Lab Studies and Imaging         Subjective:

## 2024-02-06 NOTE — PROGRESS NOTES
Patient ambulated in hallway on room air with Sao2 remaining 92-93% for the entire walk . Moderate shortness of breath noted.

## 2024-02-07 ENCOUNTER — PARAMEDICINE (OUTPATIENT)
Dept: OTHER | Age: 64
End: 2024-02-07

## 2024-02-07 VITALS
SYSTOLIC BLOOD PRESSURE: 146 MMHG | HEIGHT: 58 IN | WEIGHT: 74 LBS | DIASTOLIC BLOOD PRESSURE: 50 MMHG | OXYGEN SATURATION: 96 % | RESPIRATION RATE: 16 BRPM | HEART RATE: 59 BPM | TEMPERATURE: 97.9 F | BODY MASS INDEX: 15.54 KG/M2

## 2024-02-07 PROCEDURE — 85025 COMPLETE CBC W/AUTO DIFF WBC: CPT

## 2024-02-07 PROCEDURE — 6370000000 HC RX 637 (ALT 250 FOR IP): Performed by: PHYSICIAN ASSISTANT

## 2024-02-07 PROCEDURE — 6370000000 HC RX 637 (ALT 250 FOR IP): Performed by: NURSE PRACTITIONER

## 2024-02-07 PROCEDURE — 94761 N-INVAS EAR/PLS OXIMETRY MLT: CPT

## 2024-02-07 PROCEDURE — 51798 US URINE CAPACITY MEASURE: CPT

## 2024-02-07 PROCEDURE — 94640 AIRWAY INHALATION TREATMENT: CPT

## 2024-02-07 PROCEDURE — 6360000002 HC RX W HCPCS: Performed by: NURSE PRACTITIONER

## 2024-02-07 PROCEDURE — 80048 BASIC METABOLIC PNL TOTAL CA: CPT

## 2024-02-07 RX ORDER — PREDNISONE 20 MG/1
40 TABLET ORAL DAILY
Qty: 4 TABLET | Refills: 0 | Status: SHIPPED | OUTPATIENT
Start: 2024-02-07 | End: 2024-02-09

## 2024-02-07 RX ORDER — BENZONATATE 200 MG/1
200 CAPSULE ORAL 3 TIMES DAILY PRN
Qty: 30 CAPSULE | Refills: 0 | Status: SHIPPED | OUTPATIENT
Start: 2024-02-07 | End: 2024-02-17

## 2024-02-07 RX ORDER — ONDANSETRON 4 MG/1
4 TABLET, ORALLY DISINTEGRATING ORAL EVERY 8 HOURS PRN
Status: DISCONTINUED | OUTPATIENT
Start: 2024-02-07 | End: 2024-02-07 | Stop reason: HOSPADM

## 2024-02-07 RX ORDER — AZITHROMYCIN 250 MG/1
250 TABLET, FILM COATED ORAL SEE ADMIN INSTRUCTIONS
Qty: 3 TABLET | Refills: 0
Start: 2024-02-08 | End: 2024-02-11

## 2024-02-07 RX ADMIN — IPRATROPIUM BROMIDE AND ALBUTEROL SULFATE 1 DOSE: 2.5; .5 SOLUTION RESPIRATORY (INHALATION) at 14:36

## 2024-02-07 RX ADMIN — ONDANSETRON 4 MG: 4 TABLET, ORALLY DISINTEGRATING ORAL at 10:49

## 2024-02-07 RX ADMIN — NYSTATIN 500000 UNITS: 100000 SUSPENSION ORAL at 12:51

## 2024-02-07 RX ADMIN — NYSTATIN 500000 UNITS: 100000 SUSPENSION ORAL at 10:14

## 2024-02-07 RX ADMIN — CLOPIDOGREL BISULFATE 75 MG: 75 TABLET ORAL at 10:14

## 2024-02-07 RX ADMIN — PREDNISONE 40 MG: 20 TABLET ORAL at 10:14

## 2024-02-07 RX ADMIN — PANTOPRAZOLE SODIUM 40 MG: 40 TABLET, DELAYED RELEASE ORAL at 06:09

## 2024-02-07 RX ADMIN — ACETAMINOPHEN 650 MG: 325 TABLET ORAL at 14:29

## 2024-02-07 RX ADMIN — ASPIRIN 81 MG: 81 TABLET, COATED ORAL at 10:14

## 2024-02-07 RX ADMIN — ENOXAPARIN SODIUM 30 MG: 100 INJECTION SUBCUTANEOUS at 10:15

## 2024-02-07 RX ADMIN — IPRATROPIUM BROMIDE AND ALBUTEROL SULFATE 1 DOSE: 2.5; .5 SOLUTION RESPIRATORY (INHALATION) at 07:19

## 2024-02-07 RX ADMIN — METOPROLOL TARTRATE 25 MG: 25 TABLET, FILM COATED ORAL at 10:14

## 2024-02-07 RX ADMIN — AMLODIPINE BESYLATE 5 MG: 5 TABLET ORAL at 10:14

## 2024-02-07 RX ADMIN — AZITHROMYCIN DIHYDRATE 250 MG: 250 TABLET, FILM COATED ORAL at 10:14

## 2024-02-07 ASSESSMENT — PAIN SCALES - GENERAL: PAINLEVEL_OUTOF10: 3

## 2024-02-07 ASSESSMENT — PAIN - FUNCTIONAL ASSESSMENT: PAIN_FUNCTIONAL_ASSESSMENT: ACTIVITIES ARE NOT PREVENTED

## 2024-02-07 ASSESSMENT — PAIN DESCRIPTION - LOCATION: LOCATION: GENERALIZED

## 2024-02-07 ASSESSMENT — PAIN DESCRIPTION - DESCRIPTORS: DESCRIPTORS: ACHING;DISCOMFORT

## 2024-02-07 NOTE — PROGRESS NOTES
Provided a list of housing in [place]. Patient does not have a phone. Advised I would follow up with her after discharge to see if she would like some help calling places. She asked that I call on Dalia's phone [phone]. Will follow up with her next week after discharge. Will continue to follow.

## 2024-02-07 NOTE — PLAN OF CARE
Problem: Pain  Goal: Verbalizes/displays adequate comfort level or baseline comfort level  2/7/2024 0914 by Sandra Chaudhary RN  Outcome: Adequate for Discharge  2/6/2024 2202 by Rika Delatorre RN  Outcome: Progressing     Problem: Safety - Adult  Goal: Free from fall injury  2/7/2024 0914 by Sandra Chaudhary RN  Outcome: Adequate for Discharge  2/6/2024 2202 by Rika Delatorre RN  Outcome: Progressing     Problem: ABCDS Injury Assessment  Goal: Absence of physical injury  2/7/2024 0914 by Sandra Chaudhary RN  Outcome: Adequate for Discharge  2/6/2024 2202 by Rika Delatorre RN  Outcome: Progressing     Problem: Respiratory - Adult  Goal: Achieves optimal ventilation and oxygenation  2/7/2024 0914 by Sandra Chaudhary RN  Outcome: Adequate for Discharge  2/6/2024 2202 by Rika Delatorre RN  Outcome: Progressing     Problem: Cardiovascular - Adult  Goal: Maintains optimal cardiac output and hemodynamic stability  2/7/2024 0914 by Sandra Chaudhary RN  Outcome: Adequate for Discharge  2/6/2024 2202 by Rika Delatorre RN  Outcome: Progressing  Goal: Absence of cardiac dysrhythmias or at baseline  2/7/2024 0914 by Sandra Chaudhary RN  Outcome: Adequate for Discharge  2/6/2024 2202 by Rika Delatorre RN  Outcome: Progressing     Problem: Musculoskeletal - Adult  Goal: Return mobility to safest level of function  2/7/2024 0914 by Sandra Chaudhary RN  Outcome: Adequate for Discharge  2/6/2024 2202 by Rika Delatorre RN  Outcome: Progressing     Problem: Infection - Adult  Goal: Absence of infection at discharge  2/7/2024 0914 by Sandra Chaudhary RN  Outcome: Adequate for Discharge  2/6/2024 2202 by Rika Delatorre RN  Outcome: Progressing     Problem: Discharge Planning  Goal: Discharge to home or other facility with appropriate resources  2/7/2024 0914 by Sandra Chaudhary RN  Outcome: Adequate for Discharge  2/6/2024 2202 by Rika Delatorre RN  Outcome: Progressing     Problem: Nutrition

## 2024-02-07 NOTE — DISCHARGE INSTRUCTIONS
Make sure you are swishing and spitting after using your inhaler to prevent thrush.     Return to the emergency department if you develop worsening shortness of breath, chest pain, loss of consciousness.     Follow-up with your cardiologist and pulmonologist as scheduled.     Continue a protein supplement with breakfast, lunch and dinner (Ensure).

## 2024-02-07 NOTE — PLAN OF CARE
Problem: Pain  Goal: Verbalizes/displays adequate comfort level or baseline comfort level  Outcome: Progressing     Problem: Safety - Adult  Goal: Free from fall injury  Outcome: Progressing     Problem: ABCDS Injury Assessment  Goal: Absence of physical injury  Outcome: Progressing     Problem: Respiratory - Adult  Goal: Achieves optimal ventilation and oxygenation  Outcome: Progressing     Problem: Cardiovascular - Adult  Goal: Maintains optimal cardiac output and hemodynamic stability  Outcome: Progressing  Goal: Absence of cardiac dysrhythmias or at baseline  Outcome: Progressing     Problem: Musculoskeletal - Adult  Goal: Return mobility to safest level of function  Outcome: Progressing     Problem: Infection - Adult  Goal: Absence of infection at discharge  Outcome: Progressing     Problem: Discharge Planning  Goal: Discharge to home or other facility with appropriate resources  Outcome: Progressing     Problem: Nutrition Deficit:  Goal: Optimize nutritional status  Outcome: Progressing

## 2024-02-07 NOTE — PROGRESS NOTES
Pt was discharged from the unit at 1500 with all personal belongings and applicable prescriptions.  This nurse reviewed the follow-up appointments and Medications upon discharge were discussed with the patient and are detailed in the After Visit Summary. Patient instructed to call with any questions or concerns. To frontn door per W/C.        Electronically signed by Cecilia Chi RN on 2/7/24 at 3:26 PM EST

## 2024-02-07 NOTE — DISCHARGE SUMMARY
09/30/2013 05:45 PM    BACTERIA LARGE 03/18/2016 03:00 PM    CLARITYU Dark 02/22/2017 03:37 PM    CLARITYU SL HAZY 03/18/2016 03:00 PM    SPECGRAV 1.025 02/22/2017 03:37 PM    SPECGRAV 1.025 03/18/2016 03:00 PM    LEUKOCYTESUR Negative 02/22/2017 03:37 PM    LEUKOCYTESUR NEGATIVE 03/18/2016 03:00 PM    UROBILINOGEN 0.2 03/18/2016 03:00 PM    BILIRUBINUR Negative 02/22/2017 03:37 PM    BLOODU Negative 02/22/2017 03:37 PM    BLOODU NEGATIVE 03/18/2016 03:00 PM    GLUCOSEU Negative 02/22/2017 03:37 PM    KETUA Trace 02/22/2017 03:37 PM    KETUA NEGATIVE 03/18/2016 03:00 PM     Urine Cultures: No results found for: \"LABURIN\"  Blood Cultures: No results found for: \"BC\"  No results found for: \"BLOODCULT2\"  Organism: No results found for: \"ORG\"    Time Spent Discharging patient 35 minutes    Electronically signed by Mary Kate Infante PA-C on 2/7/2024 at 8:04 AM

## 2024-02-09 LAB
CULTURE: NORMAL
CULTURE: NORMAL
Lab: NORMAL
Lab: NORMAL
SPECIMEN: NORMAL
SPECIMEN: NORMAL

## 2024-02-20 ENCOUNTER — TELEPHONE (OUTPATIENT)
Dept: OTHER | Age: 64
End: 2024-02-20

## 2024-02-20 NOTE — TELEPHONE ENCOUNTER
Reached out to patient's daughter Dalia (per patients request) to see how Kate was doing and to see if I could help with anything. No answer. Left message for return call.

## 2024-02-22 ENCOUNTER — TELEPHONE (OUTPATIENT)
Dept: OTHER | Age: 64
End: 2024-02-22

## 2024-02-22 PROBLEM — R79.89 ELEVATED TROPONIN: Status: RESOLVED | Noted: 2024-01-23 | Resolved: 2024-02-22

## 2024-02-22 NOTE — TELEPHONE ENCOUNTER
Reached out to patient's daughter to see if I could make a home visit. Second attempt. If no return call with remove from the program.

## 2024-03-05 ENCOUNTER — TELEPHONE (OUTPATIENT)
Dept: OTHER | Age: 64
End: 2024-03-05

## 2024-03-05 NOTE — TELEPHONE ENCOUNTER
Third attempt was made to contact the patients daughter to see if I could make a home visit. No answer. Will remove from the program at this time. Should another referral be made another file will be opened.

## 2024-03-16 ENCOUNTER — APPOINTMENT (OUTPATIENT)
Dept: GENERAL RADIOLOGY | Age: 64
End: 2024-03-16
Payer: MEDICAID

## 2024-03-16 ENCOUNTER — HOSPITAL ENCOUNTER (OUTPATIENT)
Age: 64
Setting detail: OBSERVATION
Discharge: HOME | End: 2024-03-20
Attending: INTERNAL MEDICINE | Admitting: INTERNAL MEDICINE
Payer: MEDICAID

## 2024-03-16 ENCOUNTER — HOSPITAL ENCOUNTER (OUTPATIENT)
Age: 64
Setting detail: OBSERVATION
Discharge: ANOTHER HOSPITAL | End: 2024-03-16
Attending: EMERGENCY MEDICINE | Admitting: INTERNAL MEDICINE
Payer: MEDICAID

## 2024-03-16 VITALS
SYSTOLIC BLOOD PRESSURE: 119 MMHG | OXYGEN SATURATION: 93 % | RESPIRATION RATE: 18 BRPM | HEIGHT: 58 IN | WEIGHT: 74.8 LBS | TEMPERATURE: 97.7 F | BODY MASS INDEX: 15.7 KG/M2 | HEART RATE: 87 BPM | DIASTOLIC BLOOD PRESSURE: 56 MMHG

## 2024-03-16 DIAGNOSIS — J44.1 COPD EXACERBATION (HCC): Primary | ICD-10-CM

## 2024-03-16 DIAGNOSIS — I25.119 CORONARY ARTERY DISEASE INVOLVING NATIVE CORONARY ARTERY OF NATIVE HEART WITH ANGINA PECTORIS (HCC): Primary | ICD-10-CM

## 2024-03-16 DIAGNOSIS — R07.9 CHEST PAIN, UNSPECIFIED TYPE: ICD-10-CM

## 2024-03-16 DIAGNOSIS — J96.21 ACUTE ON CHRONIC RESPIRATORY FAILURE WITH HYPOXEMIA (HCC): ICD-10-CM

## 2024-03-16 PROBLEM — I21.4 NSTEMI (NON-ST ELEVATED MYOCARDIAL INFARCTION) (HCC): Status: ACTIVE | Noted: 2024-03-16

## 2024-03-16 LAB
ALBUMIN SERPL-MCNC: 4.1 GM/DL (ref 3.4–5)
ALP BLD-CCNC: 108 IU/L (ref 40–129)
ALT SERPL-CCNC: 40 U/L (ref 10–40)
ANION GAP SERPL CALCULATED.3IONS-SCNC: 15 MMOL/L (ref 7–16)
AST SERPL-CCNC: 55 IU/L (ref 15–37)
B PARAP IS1001 DNA NPH QL NAA+NON-PROBE: NOT DETECTED
B PERT.PT PRMT NPH QL NAA+NON-PROBE: NOT DETECTED
BASE EXCESS MIXED: 0.5 (ref 0–3)
BASE EXCESS MIXED: 3.4 (ref 0–3)
BASE EXCESS: ABNORMAL (ref 0–3)
BASE EXCESS: ABNORMAL (ref 0–3)
BASOPHILS ABSOLUTE: 0.1 K/CU MM
BASOPHILS RELATIVE PERCENT: 0.6 % (ref 0–1)
BILIRUB SERPL-MCNC: 0.3 MG/DL (ref 0–1)
BILIRUBIN URINE: NEGATIVE MG/DL
BLOOD, URINE: NEGATIVE
BUN SERPL-MCNC: 20 MG/DL (ref 6–23)
C PNEUM DNA NPH QL NAA+NON-PROBE: NOT DETECTED
CALCIUM SERPL-MCNC: 9 MG/DL (ref 8.3–10.6)
CHLORIDE BLD-SCNC: 105 MMOL/L (ref 99–110)
CLARITY: CLEAR
CO2 CONTENT: 25.5 MMOL/L (ref 21–32)
CO2 CONTENT: 31.9 MMOL/L (ref 21–32)
CO2: 26 MMOL/L (ref 21–32)
COLOR: YELLOW
COMMENT UA: NORMAL
CREAT SERPL-MCNC: 0.7 MG/DL (ref 0.6–1.1)
DIFFERENTIAL TYPE: ABNORMAL
EOSINOPHILS ABSOLUTE: 0.2 K/CU MM
EOSINOPHILS RELATIVE PERCENT: 2.2 % (ref 0–3)
FLUAV H1 2009 PAN RNA NPH NAA+NON-PROBE: NOT DETECTED
FLUAV H1 RNA NPH QL NAA+NON-PROBE: NOT DETECTED
FLUAV H3 RNA NPH QL NAA+NON-PROBE: NOT DETECTED
FLUAV RNA NPH QL NAA+NON-PROBE: NOT DETECTED
FLUBV RNA NPH QL NAA+NON-PROBE: NOT DETECTED
GFR SERPL CREATININE-BSD FRML MDRD: >60 ML/MIN/1.73M2
GLUCOSE SERPL-MCNC: 105 MG/DL (ref 70–99)
GLUCOSE, URINE: NEGATIVE MG/DL
HADV DNA NPH QL NAA+NON-PROBE: NOT DETECTED
HCO3 VENOUS: 23.9 MMOL/L (ref 22–29)
HCO3 VENOUS: 29.8 MMOL/L (ref 22–29)
HCOV 229E RNA NPH QL NAA+NON-PROBE: NOT DETECTED
HCOV HKU1 RNA NPH QL NAA+NON-PROBE: NOT DETECTED
HCOV NL63 RNA NPH QL NAA+NON-PROBE: NOT DETECTED
HCOV OC43 RNA NPH QL NAA+NON-PROBE: NOT DETECTED
HCT VFR BLD CALC: 45.9 % (ref 37–47)
HEMOGLOBIN: 15.1 GM/DL (ref 12.5–16)
HMPV RNA NPH QL NAA+NON-PROBE: NOT DETECTED
HPIV1 RNA NPH QL NAA+NON-PROBE: NOT DETECTED
HPIV2 RNA NPH QL NAA+NON-PROBE: NOT DETECTED
HPIV3 RNA NPH QL NAA+NON-PROBE: NOT DETECTED
HPIV4 RNA NPH QL NAA+NON-PROBE: NOT DETECTED
IMMATURE NEUTROPHIL %: 0.2 % (ref 0–0.43)
KETONES, URINE: NEGATIVE MG/DL
L PNEUMO AG UR QL IA: NEGATIVE
LEUKOCYTE ESTERASE, URINE: NEGATIVE
LYMPHOCYTES ABSOLUTE: 2.4 K/CU MM
LYMPHOCYTES RELATIVE PERCENT: 27.1 % (ref 24–44)
M PNEUMO DNA NPH QL NAA+NON-PROBE: NOT DETECTED
MCH RBC QN AUTO: 33 PG (ref 27–31)
MCHC RBC AUTO-ENTMCNC: 32.9 % (ref 32–36)
MCV RBC AUTO: 100.4 FL (ref 78–100)
MONOCYTES ABSOLUTE: 0.7 K/CU MM
MONOCYTES RELATIVE PERCENT: 8.2 % (ref 0–4)
NITRITE URINE, QUANTITATIVE: NEGATIVE
O2 SAT, VEN: 68.2 % (ref 50–70)
O2 SAT, VEN: 94.6 % (ref 50–70)
PCO2, VEN: 51.3 MMHG (ref 41–51)
PCO2, VEN: 67.2 MMHG (ref 41–51)
PDW BLD-RTO: 12.6 % (ref 11.7–14.9)
PH VENOUS: 7.26 (ref 7.32–7.43)
PH VENOUS: 7.28 (ref 7.32–7.43)
PH, URINE: 6 (ref 5–8)
PLATELET # BLD: 236 K/CU MM (ref 140–440)
PMV BLD AUTO: 10.5 FL (ref 7.5–11.1)
PO2, VEN: 42.3 MMHG (ref 28–48)
PO2, VEN: 84 MMHG (ref 28–48)
POTASSIUM SERPL-SCNC: 4.8 MMOL/L (ref 3.5–5.1)
PRO-BNP: 204.5 PG/ML
PROCALCITONIN SERPL-MCNC: 0.04 NG/ML
PROTEIN UA: NEGATIVE MG/DL
RBC # BLD: 4.57 M/CU MM (ref 4.2–5.4)
RSV RNA NPH QL NAA+NON-PROBE: NOT DETECTED
RV+EV RNA NPH QL NAA+NON-PROBE: NOT DETECTED
S PNEUM AG CSF QL: NORMAL
SARS-COV-2 RNA NPH QL NAA+NON-PROBE: NOT DETECTED
SEGMENTED NEUTROPHILS ABSOLUTE COUNT: 5.5 K/CU MM
SEGMENTED NEUTROPHILS RELATIVE PERCENT: 61.7 % (ref 36–66)
SODIUM BLD-SCNC: 146 MMOL/L (ref 135–145)
SOURCE, BLOOD GAS: ABNORMAL
SOURCE, BLOOD GAS: ABNORMAL
SPECIFIC GRAVITY UA: 1.02 (ref 1–1.03)
TOTAL IMMATURE NEUTOROPHIL: 0.02 K/CU MM
TOTAL PROTEIN: 7 GM/DL (ref 6.4–8.2)
TROPONIN, HIGH SENSITIVITY: 22 NG/L (ref 0–14)
TROPONIN, HIGH SENSITIVITY: 52 NG/L (ref 0–14)
TROPONIN, HIGH SENSITIVITY: 87 NG/L (ref 0–14)
UROBILINOGEN, URINE: 0.2 MG/DL (ref 0.2–1)
WBC # BLD: 8.8 K/CU MM (ref 4–10.5)

## 2024-03-16 PROCEDURE — 71045 X-RAY EXAM CHEST 1 VIEW: CPT

## 2024-03-16 PROCEDURE — 83880 ASSAY OF NATRIURETIC PEPTIDE: CPT

## 2024-03-16 PROCEDURE — 93005 ELECTROCARDIOGRAM TRACING: CPT | Performed by: EMERGENCY MEDICINE

## 2024-03-16 PROCEDURE — 6360000002 HC RX W HCPCS: Performed by: EMERGENCY MEDICINE

## 2024-03-16 PROCEDURE — 0202U NFCT DS 22 TRGT SARS-COV-2: CPT

## 2024-03-16 PROCEDURE — G0379 DIRECT REFER HOSPITAL OBSERV: HCPCS

## 2024-03-16 PROCEDURE — 96372 THER/PROPH/DIAG INJ SC/IM: CPT

## 2024-03-16 PROCEDURE — 96365 THER/PROPH/DIAG IV INF INIT: CPT

## 2024-03-16 PROCEDURE — 93005 ELECTROCARDIOGRAM TRACING: CPT | Performed by: NURSE PRACTITIONER

## 2024-03-16 PROCEDURE — 87086 URINE CULTURE/COLONY COUNT: CPT

## 2024-03-16 PROCEDURE — 6370000000 HC RX 637 (ALT 250 FOR IP): Performed by: STUDENT IN AN ORGANIZED HEALTH CARE EDUCATION/TRAINING PROGRAM

## 2024-03-16 PROCEDURE — 2580000003 HC RX 258: Performed by: EMERGENCY MEDICINE

## 2024-03-16 PROCEDURE — 94150 VITAL CAPACITY TEST: CPT

## 2024-03-16 PROCEDURE — 2580000003 HC RX 258: Performed by: STUDENT IN AN ORGANIZED HEALTH CARE EDUCATION/TRAINING PROGRAM

## 2024-03-16 PROCEDURE — 6360000002 HC RX W HCPCS: Performed by: NURSE PRACTITIONER

## 2024-03-16 PROCEDURE — 6370000000 HC RX 637 (ALT 250 FOR IP)

## 2024-03-16 PROCEDURE — 6370000000 HC RX 637 (ALT 250 FOR IP): Performed by: EMERGENCY MEDICINE

## 2024-03-16 PROCEDURE — 84145 PROCALCITONIN (PCT): CPT

## 2024-03-16 PROCEDURE — 94761 N-INVAS EAR/PLS OXIMETRY MLT: CPT

## 2024-03-16 PROCEDURE — 85025 COMPLETE CBC W/AUTO DIFF WBC: CPT

## 2024-03-16 PROCEDURE — 6360000002 HC RX W HCPCS

## 2024-03-16 PROCEDURE — 82805 BLOOD GASES W/O2 SATURATION: CPT

## 2024-03-16 PROCEDURE — 94640 AIRWAY INHALATION TREATMENT: CPT

## 2024-03-16 PROCEDURE — 2580000003 HC RX 258: Performed by: NURSE PRACTITIONER

## 2024-03-16 PROCEDURE — 2700000000 HC OXYGEN THERAPY PER DAY

## 2024-03-16 PROCEDURE — 84484 ASSAY OF TROPONIN QUANT: CPT

## 2024-03-16 PROCEDURE — 6370000000 HC RX 637 (ALT 250 FOR IP): Performed by: NURSE PRACTITIONER

## 2024-03-16 PROCEDURE — 80053 COMPREHEN METABOLIC PANEL: CPT

## 2024-03-16 PROCEDURE — 81003 URINALYSIS AUTO W/O SCOPE: CPT

## 2024-03-16 PROCEDURE — 99285 EMERGENCY DEPT VISIT HI MDM: CPT

## 2024-03-16 PROCEDURE — G0378 HOSPITAL OBSERVATION PER HR: HCPCS

## 2024-03-16 PROCEDURE — 2500000003 HC RX 250 WO HCPCS

## 2024-03-16 PROCEDURE — 87449 NOS EACH ORGANISM AG IA: CPT

## 2024-03-16 PROCEDURE — 96375 TX/PRO/DX INJ NEW DRUG ADDON: CPT

## 2024-03-16 PROCEDURE — 87899 AGENT NOS ASSAY W/OPTIC: CPT

## 2024-03-16 RX ORDER — CLOPIDOGREL BISULFATE 75 MG/1
75 TABLET ORAL DAILY
Status: DISCONTINUED | OUTPATIENT
Start: 2024-03-16 | End: 2024-03-16 | Stop reason: HOSPADM

## 2024-03-16 RX ORDER — SODIUM CHLORIDE 0.9 % (FLUSH) 0.9 %
5-40 SYRINGE (ML) INJECTION EVERY 12 HOURS SCHEDULED
Status: DISCONTINUED | OUTPATIENT
Start: 2024-03-16 | End: 2024-03-16 | Stop reason: HOSPADM

## 2024-03-16 RX ORDER — IPRATROPIUM BROMIDE AND ALBUTEROL SULFATE 2.5; .5 MG/3ML; MG/3ML
1 SOLUTION RESPIRATORY (INHALATION) EVERY 4 HOURS PRN
Status: DISCONTINUED | OUTPATIENT
Start: 2024-03-16 | End: 2024-03-16 | Stop reason: HOSPADM

## 2024-03-16 RX ORDER — ONDANSETRON 2 MG/ML
4 INJECTION INTRAMUSCULAR; INTRAVENOUS EVERY 6 HOURS PRN
Status: DISCONTINUED | OUTPATIENT
Start: 2024-03-16 | End: 2024-03-20 | Stop reason: HOSPADM

## 2024-03-16 RX ORDER — AMLODIPINE BESYLATE 5 MG/1
5 TABLET ORAL DAILY
Status: DISCONTINUED | OUTPATIENT
Start: 2024-03-16 | End: 2024-03-20 | Stop reason: HOSPADM

## 2024-03-16 RX ORDER — IPRATROPIUM BROMIDE AND ALBUTEROL SULFATE 2.5; .5 MG/3ML; MG/3ML
3 SOLUTION RESPIRATORY (INHALATION) ONCE
Status: COMPLETED | OUTPATIENT
Start: 2024-03-16 | End: 2024-03-16

## 2024-03-16 RX ORDER — IPRATROPIUM BROMIDE AND ALBUTEROL SULFATE 2.5; .5 MG/3ML; MG/3ML
1 SOLUTION RESPIRATORY (INHALATION)
Status: DISCONTINUED | OUTPATIENT
Start: 2024-03-16 | End: 2024-03-16

## 2024-03-16 RX ORDER — ROSUVASTATIN CALCIUM 20 MG/1
20 TABLET, COATED ORAL DAILY
Status: DISCONTINUED | OUTPATIENT
Start: 2024-03-16 | End: 2024-03-16

## 2024-03-16 RX ORDER — PREDNISONE 20 MG/1
40 TABLET ORAL DAILY
Status: DISCONTINUED | OUTPATIENT
Start: 2024-03-17 | End: 2024-03-20 | Stop reason: HOSPADM

## 2024-03-16 RX ORDER — ENOXAPARIN SODIUM 100 MG/ML
30 INJECTION SUBCUTANEOUS DAILY
Status: DISCONTINUED | OUTPATIENT
Start: 2024-03-16 | End: 2024-03-16 | Stop reason: HOSPADM

## 2024-03-16 RX ORDER — SODIUM CHLORIDE 9 MG/ML
INJECTION, SOLUTION INTRAVENOUS PRN
Status: DISCONTINUED | OUTPATIENT
Start: 2024-03-16 | End: 2024-03-20 | Stop reason: HOSPADM

## 2024-03-16 RX ORDER — ONDANSETRON 4 MG/1
4 TABLET, ORALLY DISINTEGRATING ORAL EVERY 8 HOURS PRN
Status: DISCONTINUED | OUTPATIENT
Start: 2024-03-16 | End: 2024-03-20 | Stop reason: HOSPADM

## 2024-03-16 RX ORDER — MORPHINE SULFATE 2 MG/ML
1 INJECTION, SOLUTION INTRAMUSCULAR; INTRAVENOUS ONCE
Status: DISCONTINUED | OUTPATIENT
Start: 2024-03-16 | End: 2024-03-16 | Stop reason: HOSPADM

## 2024-03-16 RX ORDER — ATORVASTATIN CALCIUM 40 MG/1
40 TABLET, FILM COATED ORAL NIGHTLY
Status: DISCONTINUED | OUTPATIENT
Start: 2024-03-16 | End: 2024-03-20 | Stop reason: HOSPADM

## 2024-03-16 RX ORDER — ACETAMINOPHEN 500 MG
TABLET ORAL
Status: COMPLETED
Start: 2024-03-16 | End: 2024-03-16

## 2024-03-16 RX ORDER — SODIUM CHLORIDE 0.9 % (FLUSH) 0.9 %
5-40 SYRINGE (ML) INJECTION PRN
Status: DISCONTINUED | OUTPATIENT
Start: 2024-03-16 | End: 2024-03-16 | Stop reason: HOSPADM

## 2024-03-16 RX ORDER — BENZONATATE 100 MG/1
200 CAPSULE ORAL 3 TIMES DAILY PRN
Status: DISCONTINUED | OUTPATIENT
Start: 2024-03-16 | End: 2024-03-16 | Stop reason: HOSPADM

## 2024-03-16 RX ORDER — NITROGLYCERIN 0.4 MG/1
0.4 TABLET SUBLINGUAL EVERY 5 MIN PRN
Status: DISCONTINUED | OUTPATIENT
Start: 2024-03-16 | End: 2024-03-16 | Stop reason: HOSPADM

## 2024-03-16 RX ORDER — SENNOSIDES A AND B 8.6 MG/1
1 TABLET, FILM COATED ORAL DAILY PRN
Status: DISCONTINUED | OUTPATIENT
Start: 2024-03-16 | End: 2024-03-16 | Stop reason: HOSPADM

## 2024-03-16 RX ORDER — SODIUM CHLORIDE 9 MG/ML
INJECTION, SOLUTION INTRAVENOUS CONTINUOUS
Status: DISCONTINUED | OUTPATIENT
Start: 2024-03-16 | End: 2024-03-16

## 2024-03-16 RX ORDER — 0.9 % SODIUM CHLORIDE 0.9 %
1000 INTRAVENOUS SOLUTION INTRAVENOUS ONCE
Status: COMPLETED | OUTPATIENT
Start: 2024-03-16 | End: 2024-03-16

## 2024-03-16 RX ORDER — GABAPENTIN 300 MG/1
600 CAPSULE ORAL DAILY
Status: DISCONTINUED | OUTPATIENT
Start: 2024-03-16 | End: 2024-03-20 | Stop reason: HOSPADM

## 2024-03-16 RX ORDER — POLYETHYLENE GLYCOL 3350 17 G/17G
17 POWDER, FOR SOLUTION ORAL DAILY PRN
Status: DISCONTINUED | OUTPATIENT
Start: 2024-03-16 | End: 2024-03-20 | Stop reason: HOSPADM

## 2024-03-16 RX ORDER — SODIUM CHLORIDE 9 MG/ML
INJECTION, SOLUTION INTRAVENOUS PRN
Status: DISCONTINUED | OUTPATIENT
Start: 2024-03-16 | End: 2024-03-16 | Stop reason: HOSPADM

## 2024-03-16 RX ORDER — METHYLPREDNISOLONE SODIUM SUCCINATE 125 MG/2ML
125 INJECTION, POWDER, LYOPHILIZED, FOR SOLUTION INTRAMUSCULAR; INTRAVENOUS ONCE
Status: COMPLETED | OUTPATIENT
Start: 2024-03-16 | End: 2024-03-16

## 2024-03-16 RX ORDER — ACETAMINOPHEN 650 MG/1
650 SUPPOSITORY RECTAL EVERY 6 HOURS PRN
Status: DISCONTINUED | OUTPATIENT
Start: 2024-03-16 | End: 2024-03-20 | Stop reason: HOSPADM

## 2024-03-16 RX ORDER — AZITHROMYCIN 250 MG/1
500 TABLET, FILM COATED ORAL DAILY
Status: DISCONTINUED | OUTPATIENT
Start: 2024-03-17 | End: 2024-03-16 | Stop reason: HOSPADM

## 2024-03-16 RX ORDER — ROSUVASTATIN CALCIUM 20 MG/1
20 TABLET, COATED ORAL NIGHTLY
Status: DISCONTINUED | OUTPATIENT
Start: 2024-03-17 | End: 2024-03-16 | Stop reason: HOSPADM

## 2024-03-16 RX ORDER — GUAIFENESIN 200 MG/10ML
200 LIQUID ORAL ONCE
Status: COMPLETED | OUTPATIENT
Start: 2024-03-16 | End: 2024-03-16

## 2024-03-16 RX ORDER — ENOXAPARIN SODIUM 100 MG/ML
30 INJECTION SUBCUTANEOUS EVERY EVENING
Status: DISCONTINUED | OUTPATIENT
Start: 2024-03-17 | End: 2024-03-20 | Stop reason: HOSPADM

## 2024-03-16 RX ORDER — AZITHROMYCIN 250 MG/1
250 TABLET, FILM COATED ORAL EVERY EVENING
Status: DISCONTINUED | OUTPATIENT
Start: 2024-03-16 | End: 2024-03-17

## 2024-03-16 RX ORDER — CALCIUM CARBONATE 500 MG/1
500 TABLET, CHEWABLE ORAL 3 TIMES DAILY PRN
Status: DISCONTINUED | OUTPATIENT
Start: 2024-03-16 | End: 2024-03-20 | Stop reason: HOSPADM

## 2024-03-16 RX ORDER — BENZONATATE 100 MG/1
100 CAPSULE ORAL 3 TIMES DAILY PRN
Status: DISCONTINUED | OUTPATIENT
Start: 2024-03-16 | End: 2024-03-20 | Stop reason: HOSPADM

## 2024-03-16 RX ORDER — ALBUTEROL SULFATE 2.5 MG/3ML
2.5 SOLUTION RESPIRATORY (INHALATION) EVERY 6 HOURS PRN
Status: DISCONTINUED | OUTPATIENT
Start: 2024-03-16 | End: 2024-03-18

## 2024-03-16 RX ORDER — ACETAMINOPHEN 650 MG/1
650 SUPPOSITORY RECTAL EVERY 6 HOURS PRN
Status: DISCONTINUED | OUTPATIENT
Start: 2024-03-16 | End: 2024-03-16 | Stop reason: HOSPADM

## 2024-03-16 RX ORDER — POTASSIUM CHLORIDE 7.45 MG/ML
10 INJECTION INTRAVENOUS PRN
Status: DISCONTINUED | OUTPATIENT
Start: 2024-03-16 | End: 2024-03-20 | Stop reason: HOSPADM

## 2024-03-16 RX ORDER — POTASSIUM CHLORIDE 20 MEQ/1
40 TABLET, EXTENDED RELEASE ORAL PRN
Status: DISCONTINUED | OUTPATIENT
Start: 2024-03-16 | End: 2024-03-20 | Stop reason: HOSPADM

## 2024-03-16 RX ORDER — ONDANSETRON 2 MG/ML
4 INJECTION INTRAMUSCULAR; INTRAVENOUS EVERY 6 HOURS PRN
Status: DISCONTINUED | OUTPATIENT
Start: 2024-03-16 | End: 2024-03-16 | Stop reason: HOSPADM

## 2024-03-16 RX ORDER — CLOPIDOGREL BISULFATE 75 MG/1
75 TABLET ORAL DAILY
Status: DISCONTINUED | OUTPATIENT
Start: 2024-03-17 | End: 2024-03-20 | Stop reason: HOSPADM

## 2024-03-16 RX ORDER — POLYETHYLENE GLYCOL 3350 17 G
2 POWDER IN PACKET (EA) ORAL
Status: DISCONTINUED | OUTPATIENT
Start: 2024-03-16 | End: 2024-03-16 | Stop reason: HOSPADM

## 2024-03-16 RX ORDER — ALBUTEROL SULFATE 90 UG/1
2 AEROSOL, METERED RESPIRATORY (INHALATION) EVERY 6 HOURS PRN
Status: DISCONTINUED | OUTPATIENT
Start: 2024-03-16 | End: 2024-03-20 | Stop reason: HOSPADM

## 2024-03-16 RX ORDER — ASPIRIN 81 MG/1
81 TABLET ORAL DAILY
Status: DISCONTINUED | OUTPATIENT
Start: 2024-03-16 | End: 2024-03-16 | Stop reason: HOSPADM

## 2024-03-16 RX ORDER — MAGNESIUM SULFATE IN WATER 40 MG/ML
2000 INJECTION, SOLUTION INTRAVENOUS PRN
Status: DISCONTINUED | OUTPATIENT
Start: 2024-03-16 | End: 2024-03-20 | Stop reason: HOSPADM

## 2024-03-16 RX ORDER — PANTOPRAZOLE SODIUM 40 MG/1
40 TABLET, DELAYED RELEASE ORAL
Status: DISCONTINUED | OUTPATIENT
Start: 2024-03-17 | End: 2024-03-20 | Stop reason: HOSPADM

## 2024-03-16 RX ORDER — ACETAMINOPHEN 325 MG/1
650 TABLET ORAL EVERY 6 HOURS PRN
Status: DISCONTINUED | OUTPATIENT
Start: 2024-03-16 | End: 2024-03-20 | Stop reason: HOSPADM

## 2024-03-16 RX ORDER — ACETAMINOPHEN 325 MG/1
650 TABLET ORAL EVERY 6 HOURS PRN
Status: DISCONTINUED | OUTPATIENT
Start: 2024-03-16 | End: 2024-03-16 | Stop reason: HOSPADM

## 2024-03-16 RX ORDER — GUAIFENESIN 600 MG/1
600 TABLET, EXTENDED RELEASE ORAL 2 TIMES DAILY
Status: DISCONTINUED | OUTPATIENT
Start: 2024-03-16 | End: 2024-03-16 | Stop reason: HOSPADM

## 2024-03-16 RX ORDER — BENZONATATE 100 MG/1
100 CAPSULE ORAL 3 TIMES DAILY PRN
Status: DISCONTINUED | OUTPATIENT
Start: 2024-03-16 | End: 2024-03-16

## 2024-03-16 RX ORDER — ASPIRIN 81 MG/1
81 TABLET, CHEWABLE ORAL DAILY
Status: DISCONTINUED | OUTPATIENT
Start: 2024-03-17 | End: 2024-03-20 | Stop reason: HOSPADM

## 2024-03-16 RX ORDER — SODIUM CHLORIDE 0.9 % (FLUSH) 0.9 %
5-40 SYRINGE (ML) INJECTION PRN
Status: DISCONTINUED | OUTPATIENT
Start: 2024-03-16 | End: 2024-03-20 | Stop reason: HOSPADM

## 2024-03-16 RX ORDER — PANTOPRAZOLE SODIUM 40 MG/1
40 TABLET, DELAYED RELEASE ORAL
Status: DISCONTINUED | OUTPATIENT
Start: 2024-03-16 | End: 2024-03-16 | Stop reason: HOSPADM

## 2024-03-16 RX ORDER — ONDANSETRON 2 MG/ML
INJECTION INTRAMUSCULAR; INTRAVENOUS
Status: COMPLETED
Start: 2024-03-16 | End: 2024-03-16

## 2024-03-16 RX ORDER — ACETAMINOPHEN 500 MG
1000 TABLET ORAL ONCE
Status: COMPLETED | OUTPATIENT
Start: 2024-03-16 | End: 2024-03-16

## 2024-03-16 RX ORDER — SODIUM CHLORIDE 0.9 % (FLUSH) 0.9 %
5-40 SYRINGE (ML) INJECTION EVERY 12 HOURS SCHEDULED
Status: DISCONTINUED | OUTPATIENT
Start: 2024-03-16 | End: 2024-03-20 | Stop reason: HOSPADM

## 2024-03-16 RX ORDER — GUAIFENESIN 200 MG/10ML
LIQUID ORAL
Status: COMPLETED
Start: 2024-03-16 | End: 2024-03-16

## 2024-03-16 RX ADMIN — ROSUVASTATIN CALCIUM 20 MG: 20 TABLET, FILM COATED ORAL at 08:29

## 2024-03-16 RX ADMIN — SODIUM CHLORIDE, PRESERVATIVE FREE 10 ML: 5 INJECTION INTRAVENOUS at 20:39

## 2024-03-16 RX ADMIN — IPRATROPIUM BROMIDE AND ALBUTEROL SULFATE 1 DOSE: 2.5; .5 SOLUTION RESPIRATORY (INHALATION) at 13:10

## 2024-03-16 RX ADMIN — METHYLPREDNISOLONE SODIUM SUCCINATE 125 MG: 125 INJECTION, POWDER, LYOPHILIZED, FOR SOLUTION INTRAMUSCULAR; INTRAVENOUS at 01:24

## 2024-03-16 RX ADMIN — CALCIUM CARBONATE 500 MG: 500 TABLET, CHEWABLE ORAL at 20:34

## 2024-03-16 RX ADMIN — ENOXAPARIN SODIUM 30 MG: 100 INJECTION SUBCUTANEOUS at 08:30

## 2024-03-16 RX ADMIN — ACETAMINOPHEN 650 MG: 325 TABLET ORAL at 15:53

## 2024-03-16 RX ADMIN — Medication 1000 MG: at 01:43

## 2024-03-16 RX ADMIN — ACETAMINOPHEN 650 MG: 325 TABLET ORAL at 08:29

## 2024-03-16 RX ADMIN — NITROGLYCERIN 0.4 MG: 0.4 TABLET, ORALLY DISINTEGRATING SUBLINGUAL at 08:58

## 2024-03-16 RX ADMIN — GUAIFENESIN 600 MG: 600 TABLET ORAL at 08:28

## 2024-03-16 RX ADMIN — ATORVASTATIN CALCIUM 40 MG: 40 TABLET, FILM COATED ORAL at 20:35

## 2024-03-16 RX ADMIN — GABAPENTIN 600 MG: 300 CAPSULE ORAL at 20:35

## 2024-03-16 RX ADMIN — ONDANSETRON 4 MG: 2 INJECTION INTRAMUSCULAR; INTRAVENOUS at 01:43

## 2024-03-16 RX ADMIN — TIOTROPIUM BROMIDE AND OLODATEROL 2 PUFF: 3.124; 2.736 SPRAY, METERED RESPIRATORY (INHALATION) at 08:14

## 2024-03-16 RX ADMIN — PANTOPRAZOLE SODIUM 40 MG: 40 TABLET, DELAYED RELEASE ORAL at 08:28

## 2024-03-16 RX ADMIN — CLOPIDOGREL BISULFATE 75 MG: 75 TABLET ORAL at 08:29

## 2024-03-16 RX ADMIN — AZITHROMYCIN DIHYDRATE 250 MG: 250 TABLET ORAL at 20:35

## 2024-03-16 RX ADMIN — AMLODIPINE BESYLATE 5 MG: 5 TABLET ORAL at 20:35

## 2024-03-16 RX ADMIN — GUAIFENESIN 200 MG: 200 LIQUID ORAL at 01:43

## 2024-03-16 RX ADMIN — SODIUM CHLORIDE, PRESERVATIVE FREE 10 ML: 5 INJECTION INTRAVENOUS at 08:35

## 2024-03-16 RX ADMIN — ACETAMINOPHEN 1000 MG: 500 TABLET ORAL at 01:43

## 2024-03-16 RX ADMIN — METOPROLOL TARTRATE 25 MG: 25 TABLET, FILM COATED ORAL at 20:35

## 2024-03-16 RX ADMIN — IPRATROPIUM BROMIDE AND ALBUTEROL SULFATE 1 DOSE: 2.5; .5 SOLUTION RESPIRATORY (INHALATION) at 08:15

## 2024-03-16 RX ADMIN — BENZOCAINE 6 MG-MENTHOL 10 MG LOZENGES 1 LOZENGE: at 13:01

## 2024-03-16 RX ADMIN — AZITHROMYCIN 500 MG: 500 INJECTION, POWDER, LYOPHILIZED, FOR SOLUTION INTRAVENOUS at 01:24

## 2024-03-16 RX ADMIN — ASPIRIN 81 MG: 81 TABLET, COATED ORAL at 08:28

## 2024-03-16 RX ADMIN — BENZONATATE 100 MG: 100 CAPSULE ORAL at 20:38

## 2024-03-16 RX ADMIN — GUAIFENESIN 200 MG: 200 SOLUTION ORAL at 01:43

## 2024-03-16 RX ADMIN — SODIUM CHLORIDE 1000 ML: 9 INJECTION, SOLUTION INTRAVENOUS at 01:24

## 2024-03-16 RX ADMIN — METOPROLOL TARTRATE 25 MG: 25 TABLET, FILM COATED ORAL at 08:30

## 2024-03-16 RX ADMIN — IPRATROPIUM BROMIDE AND ALBUTEROL SULFATE 3 DOSE: .5; 2.5 SOLUTION RESPIRATORY (INHALATION) at 01:15

## 2024-03-16 ASSESSMENT — PAIN - FUNCTIONAL ASSESSMENT
PAIN_FUNCTIONAL_ASSESSMENT: NONE - DENIES PAIN
PAIN_FUNCTIONAL_ASSESSMENT: ACTIVITIES ARE NOT PREVENTED

## 2024-03-16 ASSESSMENT — PAIN DESCRIPTION - DESCRIPTORS
DESCRIPTORS: ACHING
DESCRIPTORS: ACHING
DESCRIPTORS: SORE

## 2024-03-16 ASSESSMENT — ENCOUNTER SYMPTOMS
CHEST TIGHTNESS: 1
COUGH: 1
SHORTNESS OF BREATH: 1

## 2024-03-16 ASSESSMENT — PAIN DESCRIPTION - LOCATION
LOCATION: THROAT
LOCATION: HEAD

## 2024-03-16 ASSESSMENT — PAIN DESCRIPTION - ONSET
ONSET: ON-GOING
ONSET: PROGRESSIVE
ONSET: ON-GOING

## 2024-03-16 ASSESSMENT — PAIN SCALES - GENERAL
PAINLEVEL_OUTOF10: 7
PAINLEVEL_OUTOF10: 0
PAINLEVEL_OUTOF10: 0
PAINLEVEL_OUTOF10: 3
PAINLEVEL_OUTOF10: 0
PAINLEVEL_OUTOF10: 3
PAINLEVEL_OUTOF10: 7
PAINLEVEL_OUTOF10: 1
PAINLEVEL_OUTOF10: 0

## 2024-03-16 ASSESSMENT — PAIN DESCRIPTION - ORIENTATION
ORIENTATION: ANTERIOR
ORIENTATION: RIGHT;LEFT
ORIENTATION: ANTERIOR

## 2024-03-16 ASSESSMENT — PAIN DESCRIPTION - FREQUENCY
FREQUENCY: CONTINUOUS
FREQUENCY: INTERMITTENT
FREQUENCY: INTERMITTENT

## 2024-03-16 ASSESSMENT — LIFESTYLE VARIABLES
HOW OFTEN DO YOU HAVE A DRINK CONTAINING ALCOHOL: NEVER
HOW MANY STANDARD DRINKS CONTAINING ALCOHOL DO YOU HAVE ON A TYPICAL DAY: PATIENT DOES NOT DRINK

## 2024-03-16 ASSESSMENT — PAIN DESCRIPTION - PAIN TYPE
TYPE: ACUTE PAIN
TYPE: ACUTE PAIN
TYPE: OTHER (COMMENT)

## 2024-03-16 NOTE — PROGRESS NOTES
[facility] here to p/u pt for transport to [facility]. Report and paperwork given to transport. Pt's belongings packed and sent with pt.

## 2024-03-16 NOTE — H&P
V2.0  History and Physical      Name:  Kate Minor /Age/Sex: 1960  (63 y.o. female)   MRN & CSN:  [id number] & [id number] Encounter Date/Time: 3/16/2024 6:03 PM EDT   Location:  OBS  PCP: Sharyn Delatorre APRN - CNP       Hospital Day: 1    Assessment and Plan:   Kate Minor is a 63 y.o. 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)    Hospital Problems             Last Modified POA    * (Principal) NSTEMI (non-ST elevated myocardial infarction) ([facility]) 3/16/2024 Yes     Chest pain/chest tightness, patient reported chest pain this morning, troponin trending upward 22, 52, 87.  Nitroglycerin was given x 1 with relief.  Repeat EKG completed normal sinus rhythm, QTc is elongated at 524.  Patient will be transferring to [facility] for cardiology consultation, Continue telemetry.  Echocardiogram completed on 2023 showed an EF of 55 to 60%, RVSP 45 mmHg.  Cardiology consulted  Known CAD, continue Plavix and aspirin statin and beta-blocker.  Patient was admitted in January and underwent left heart cath which showed CAD  Acute respiratory failure with hypoxia and hypercapnia secondary to COPD exacerbation.  Continue O2 at 2 L.  As needed nebulizers continue azithromycin and Tessalon Perles.  Respiratory viral panel was completed and negative.  Hypertension, continue amlodipine 5 mg daily and Lopressor 25 mg daily  Hyperlipidemia, continue statin  Salas's esophagus, continue PPI  Nicotine dependence, smoking cessation encouraged and discussed.  Nicotine patch was ordered.  Protein calorie malnutrition with a BMI of 15.63.  Code status - d/w patient in detail. Wishes to be full code.          Drugs that require monitoring for toxicity include lovenox  and monitor with CBC, BMP    Discussed management of

## 2024-03-16 NOTE — PROGRESS NOTES
Mamta, NP, notified of Troponin and pt's continued c/o chest pressure. Orders received for Nitro 0.4 SL. Nitro 0.4 SL given x1 with some relief of pressure per pt. Mamta notified

## 2024-03-16 NOTE — H&P
History and Physical  This patient was seen and examined autonomously  A hospitalist attending physician was available for questions/consultation as needed.       Name:  Kate Minor /Age/Sex: 1960  (63 y.o. female)   MRN & CSN:  [id number] & [id number] Admission Date/Time: 3/16/2024  1:01 AM   Location:   PCP: Sharyn Delatorre APRN - CNP         Assessment and Plan:   Kate Minor is a 63 y.o. female who presents with  Shortness of Breath    Acute respiratory failure with hypoxia 2/2 acute COPD.   - presented with worsening SOB, productive cough, wheezing; SpO2 87% RA in ED BNP:WNL  - CXR intrep by me  : COPD with no acute   - Initial VBG: PH 7.26 co2 67.2 will repeat with ABG in am                - Pt denies home O2 use may need home oxygen as she does not have any at home has not followed up with pulmonolgist; SpO2 95% RA s/p Duonebs, IV Solu-medrol  - afebrile with mild  leukocytosis (WBC 13.3)  - likely secondary to continued tobacco use says she has decreased  - check procalcitonin and urine atigens, viral respiratory panel NEG C  - start scheduled/PRN nebs, IV ABX, Tessalon, pulmonary hygiene   - monitor respiratory status    - monitor CBC, CMP daily  - TELE     Elevated Trop  - was 22 coming down from previous admission  - Denies CP; admits cough  - EKG intrep by me: sinus tachy  - ECHO 23: EF 55-60%, RVSP 45 mmHG  - Will trend TROP, EKG; monitor for changes  - TELE     HX of Hypertension   - continue home Amlopdine 5 MG QD; Lopressor 25 MG QD  - monitor BP    Questionable cystitis   Patient reports symptoms of burning and freq. And right flank pain   UA/ CS pending   On azithromycin IV for above will con until UA results    Hx of TIA  - continue ASA- plavix     HLD  - continue Statin     Salas's Esophagus  - Continue PPI     Nicotine dependence  - Smoking cessation encouraged, patch ordered    Malnutrition BMI 15.63  - supplements ordered     EKG interpreted  and Imaging  pain, frequency and urgency.        Objective:     Intake/Output Summary (Last 24 hours) at 3/16/2024 0333  Last data filed at 3/16/2024 0224  Gross per 24 hour   Intake 1250 ml   Output --   Net 1250 ml        Vitals:   Vitals:    03/16/24 0215 03/16/24 0230 03/16/24 0245 03/16/24 0300   BP: 117/75 121/73 135/84 131/69   Pulse: (!) 120 (!) 117 (!) 118 (!) 122   Resp: 23 30 24 23   Temp:       TempSrc:       SpO2: 95% 100% 100% 97%   Weight:       Height:           Physical Exam: 03/16/24     GEN -Awake alert  appearing pale  female, sitting upright in bed , NAD. Malnourished under weight  body habitus. Appears given age.  EYES -No scleral erythema, discharge, or conjunctivitis.  HENT -MM are moist. Oral pharynx without exudates, no evidence of thrush.  NECK -Supple, no apparent thyromegaly or masses.  RESP  Diminished lower lobes  NO , rales or rhonchi.  Symmetric chest movement while on RA.   C/V -S1/S2 auscultated. RRR without appreciable M/R/G. No JVD or carotid bruits. Peripheral pulses equal bilaterally and palpable. Cap refill <3 sec. No peripheral edema.   GI -Abdomen is soft non distended and without significant TTP. + BS. No masses or guarding.  Rectal exam deferred. No HSM   -right  flank tenderness. Hernandez catheter is not present.  LYMPH-No palpable cervical lymphadenopathy and no hepatosplenomegaly. No petechiae or ecchymoses.  MS -No gross joint deformities.  SKIN -Normal coloration, warm, dry.  NEURO-Cranial nerves appear grossly intact, normal speech, no lateralizing weakness.  PSYC-Awake, alert, oriented x 4- person, place, time, situation,  Appropriate affect.    Past Medical History:      Past Medical History:   Diagnosis Date    Salas's esophagus     Elevated troponin 01/23/2024    Emphysema of lung (HCC)     H/O Doppler ultrasound carotid 05/16/2023    Carotid Moderate (50-69%) disease of the Right proximal Internal carotid artery. Mild (0-49%) disease of the Left mid Internal carotid artery.

## 2024-03-16 NOTE — PROGRESS NOTES
4 Eyes Skin Assessment     NAME:  Kate Minor  YOB: 1960  MEDICAL RECORD NUMBER:  [id number]    The patient is being assessed for  Admission    I agree that at least one RN has performed a thorough Head to Toe Skin Assessment on the patient. ALL assessment sites listed below have been assessed.      Areas assessed by both nurses:    Head, Face, Ears, Shoulders, Back, Chest, Arms, Elbows, Hands, Sacrum. Buttock, Coccyx, Ischium, and Legs. Feet and Heels        Does the Patient have a Wound? No noted wound(s)       Navid Prevention initiated by RN: No  Wound Care Orders initiated by RN: No    Pressure Injury (Stage 3,4, Unstageable, DTI, NWPT, and Complex wounds) if present, place Wound referral order by RN under : No    New Ostomies, if present place, Ostomy referral order under : No     Nurse 1 eSignature: Electronically signed by Kemi Hollingsworth RN on 3/16/24 at 4:41 AM EDT    **SHARE this note so that the co-signing nurse can place an eSignature**    Nurse 2 eSignature: {Esignature:[id number]}

## 2024-03-16 NOTE — PROGRESS NOTES
Called report to OBS unit, per nurse, was in room and will call back.  Madelyn, OBS unit, called back and received report. Madelyn aware of pt's transport at 1515.

## 2024-03-16 NOTE — PLAN OF CARE
Problem: ABCDS Injury Assessment  Goal: Absence of physical injury  3/16/2024 0924 by Angus Luo RN  Outcome: Progressing  Flowsheets (Taken 3/16/2024 0924)  Absence of Physical Injury: Implement safety measures based on patient assessment  3/16/2024 0439 by Kemi Hollingsworth RN  Outcome: Progressing     Problem: Respiratory - Adult  Goal: Achieves optimal ventilation and oxygenation  3/16/2024 0924 by Angus Luo RN  Outcome: Progressing  Flowsheets (Taken 3/16/2024 0924)  Achieves optimal ventilation and oxygenation:   Assess for changes in respiratory status   Assess for changes in mentation and behavior   Position to facilitate oxygenation and minimize respiratory effort   Oxygen supplementation based on oxygen saturation or arterial blood gases   Initiate smoking cessation protocol as indicated   Encourage broncho-pulmonary hygiene including cough, deep breathe, incentive spirometry   Assess the need for suctioning and aspirate as needed   Assess and instruct to report shortness of breath or any respiratory difficulty   Respiratory therapy support as indicated  3/16/2024 0439 by Kemi Hollingsworth RN  Outcome: Progressing     Problem: Cardiovascular - Adult  Goal: Maintains optimal cardiac output and hemodynamic stability  3/16/2024 0924 by Angus Luo RN  Outcome: Progressing  Flowsheets (Taken 3/16/2024 0924)  Maintains optimal cardiac output and hemodynamic stability:   Monitor blood pressure and heart rate   Monitor urine output and notify Licensed Independent Practitioner for values outside of normal range   Assess for signs of decreased cardiac output  3/16/2024 0439 by Kemi Hollingsworth RN  Outcome: Progressing  Goal: Absence of cardiac dysrhythmias or at baseline  3/16/2024 0924 by Angus Luo RN  Outcome: Progressing  Flowsheets (Taken 3/16/2024 0924)  Absence of cardiac dysrhythmias or at baseline:   Monitor cardiac rate and rhythm   Assess for signs of decreased cardiac

## 2024-03-16 NOTE — DISCHARGE SUMMARY
V2.0  Discharge Summary    Name:  Kate Minor /Age/Sex: 1960 (63 y.o. female)   Admit Date: 3/16/2024  Discharge Date: 3/16/24    MRN & CSN:  [id number] & [id number] Encounter Date and Time 3/16/24 9:36 AM EDT    Attending:  Juan Daniel Lara MD Discharging Provider: KJ MENON NP       Hospital Course:     Brief HPI:   Kate Minor is a 63 y.o. female who presents with  increased SOB and cough. Cough started 1 week ago sputum was green/ clear in color and thick. Patient denied fevers or chest pain. Did report chest tightness when she was unable to get her breath. Reports living with son and daughter and autistic grandson who causes her great deal of stress. Reports recent admission to hospital with same symptoms but has not had time to follow up with pulmonology. She reports decreasing cigarettes to only a few a day. Does not have home oxygen but reports that she may benefit so she can walk around her house because she gets winded doing things around her house causing her to feel like she is a prisoner. Also reports burning freq urination with flank pain x 1 week no recent dx of uti.       Patient seen and examined this morning, she was complaining of chest pain, nitroglycerin given x 1 with relief.  Troponin trend is rising, patient will be transferring to [facility] for cardiology consultation.  She is in stable condition.        Brief Problem Based Course:   Chest pain/chest tightness, patient reported chest pain this morning, troponin trending upward 22, 52, 87.  Nitroglycerin was given x 1 with relief.  Repeat EKG completed normal sinus rhythm, QTc is elongated at 524.  Patient will be transferring to [facility] for cardiology consultation, as we have no cardiology services available in [place] over the weekend.  Continue telemetry.  Echocardiogram completed on 2023 showed an EF of 55 to 60%, RVSP 45 mmHg.  Known CAD, continue  03:12 AM    TRIG 39 01/24/2024 03:12 AM     Hemoglobin A1C:   Lab Results   Component Value Date/Time    LABA1C 5.5 02/05/2024 05:25 AM     TSH:   Lab Results   Component Value Date/Time    TSH 2.50 07/28/2016 02:26 PM     Troponin:   Lab Results   Component Value Date/Time    TROPONINT <0.010 04/17/2020 11:00 AM       BNP:   Recent Labs     03/16/24  0110   PROBNP 204.5     UA:  Lab Results   Component Value Date/Time    NITRU NEGATIVE 03/16/2024 04:15 AM    COLORU YELLOW 03/16/2024 04:15 AM    PHUR 5.5 02/22/2017 03:37 PM    WBCUA 0 TO 1 03/18/2016 03:00 PM    RBCUA NEGATIVE 03/18/2016 03:00 PM    MUCUS 1+ 09/30/2013 05:45 PM    BACTERIA LARGE 03/18/2016 03:00 PM    CLARITYU CLEAR 03/16/2024 04:15 AM    SPECGRAV 1.020 03/16/2024 04:15 AM    LEUKOCYTESUR NEGATIVE 03/16/2024 04:15 AM    UROBILINOGEN 0.2 03/16/2024 04:15 AM    BILIRUBINUR NEGATIVE 03/16/2024 04:15 AM    BILIRUBINUR Negative 02/22/2017 03:37 PM    BLOODU NEGATIVE 03/16/2024 04:15 AM    GLUCOSEU Negative 02/22/2017 03:37 PM    KETUA NEGATIVE 03/16/2024 04:15 AM         Time Spent Discharging patient 35 minutes.  Discharge plan was discussed with my supervising physician Dr. Lara he is in agreement with transfer to [facility] for cardiology services.    Electronically signed by KJ MENON NP on 3/16/2024 at 9:36 AM

## 2024-03-16 NOTE — PLAN OF CARE
Problem: ABCDS Injury Assessment  Goal: Absence of physical injury  Outcome: Progressing     Problem: Respiratory - Adult  Goal: Achieves optimal ventilation and oxygenation  Outcome: Progressing     Problem: Cardiovascular - Adult  Goal: Maintains optimal cardiac output and hemodynamic stability  Outcome: Progressing  Goal: Absence of cardiac dysrhythmias or at baseline  Outcome: Progressing     Problem: Infection - Adult  Goal: Absence of infection during hospitalization  Outcome: Progressing     Problem: Metabolic/Fluid and Electrolytes - Adult  Goal: Electrolytes maintained within normal limits  Outcome: Progressing

## 2024-03-16 NOTE — ED PROVIDER NOTES
Triage Chief Complaint:   Shortness of Breath    [place]:  Kaet Minor is a 63 y.o. female that presents with shortness of breath and wheezing and cough.  Patient was in baseline state of health until this past week when the above started.  Patient reports that she ran out of her medications last week and symptoms began.  Patient has had a new cough that is productive of a thick phlegm.  No fevers.  Some diarrhea for the last few days.  Patient does have associated chest tightness.    EMS was called this evening and reports that she was actually oxygenating well on room air but they placed her on a nonrebreather for comfort.  No home oxygen use.  Patient is a current smoker.    ROS:  General:  No fevers, no chills, no weakness  Eyes:  No recent vison changes, no discharge  ENT:  No sore throat, no nasal congestion, no hearing changes  Cardiovascular:  No chest pain, no palpitations  Respiratory:  + shortness of breath, + cough, no wheezing  Gastrointestinal:  No pain, no nausea, no vomiting, no diarrhea  Musculoskeletal:  No muscle pain, no joint pain  Skin:  No rash, no pruritis, no easy bruising  Neurologic:  No speech problems, no headache, no extremity numbness, no extremity tingling, no extremity weakness  Psychiatric:  No anxiety  Genitourinary:  No dysuria, no hematuria  Endocrine:  No unexpected weight gain, no unexpected weight loss  Extremities:  no edema, no pain    Past Medical History:   Diagnosis Date    Salas's esophagus     Elevated troponin 01/23/2024    Emphysema of lung (HCC)     H/O Doppler ultrasound carotid 05/16/2023    Carotid Moderate (50-69%) disease of the Right proximal Internal carotid artery. Mild (0-49%) disease of the Left mid Internal carotid artery.    H/O echocardiogram 05/16/2023    Echo EF 55-60%. Doppler evaluation reveals mild aortic, mild to moderate mitral, and mild tricuspid regurgitation. Right ventricular systolic pressure of 45 mmHg consistent with mild to moderate  review shows recent radiographs:  No results found.    MDM:  CC/HPI Summary, DDx, ED Course, and Reassessment:  Pt presents as above.  Emergent conditions considered.  Presentation prompted initial broad workup with labs, EKG and imaging.  DuoNeb breathing treatments are ordered.  IV Solu-Medrol is given.  IV fluid bolus given.    Chest x-ray is negative for acute cardiopulmonary process.  CBC and CMP without clinically significant derangement including no metabolic alkalosis.  BNP is not suggestive of volume overload.  Respiratory panel sent and negative.  Troponin is abnormal in the 20s and repeat is in the 50s and when comparing to most recent priors patient baseline seems to be in the 40s.  VBG's with mild acidemia which is improving on repeat and only marginal elevation of pCO2.    Patient on recheck is still with wheezing and further DuoNeb breathing treatment given.  Patient also complaining of a headache and some nausea and Tylenol and IV Zofran are given.  Additionally, Robitussin liquid is given for patient's coughing     Patient is requiring a small amount of nasal cannula oxygen to keep oxygen saturation in the low 90s.  With minimal exertion at bedside to bedside commode patient desaturates into the 70s and is with significant increased work of breathing.  Patient clearly would benefit from admission for further breathing treatments and time for the steroids to take effect.  I did call and I spoke with patient's cardiologist, Dr. Strauss, and case was discussed at length.  Patient did just have a cardiac cath approximately 2 months ago with moderate CAD but no PCI performed and medical management was pursued.  At this time given overall clinical picture he and I are both comfortable with patient staying here for further evaluation and treatment.  Patient is agreeable with this plan.  Hospitalist is consulted and patient to be admitted to medicine.    History from : Patient    Limitations to history :

## 2024-03-17 LAB
ALBUMIN SERPL-MCNC: 3.6 GM/DL (ref 3.4–5)
ALP BLD-CCNC: 83 IU/L (ref 40–128)
ALT SERPL-CCNC: 20 U/L (ref 10–40)
ANION GAP SERPL CALCULATED.3IONS-SCNC: 8 MMOL/L (ref 7–16)
ANION GAP SERPL CALCULATED.3IONS-SCNC: 9 MMOL/L (ref 7–16)
AST SERPL-CCNC: 17 IU/L (ref 15–37)
BILIRUB SERPL-MCNC: 0.2 MG/DL (ref 0–1)
BUN SERPL-MCNC: 19 MG/DL (ref 6–23)
BUN SERPL-MCNC: 20 MG/DL (ref 6–23)
CALCIUM SERPL-MCNC: 8.9 MG/DL (ref 8.3–10.6)
CALCIUM SERPL-MCNC: 9.2 MG/DL (ref 8.3–10.6)
CHLORIDE BLD-SCNC: 104 MMOL/L (ref 99–110)
CHLORIDE BLD-SCNC: 104 MMOL/L (ref 99–110)
CO2: 26 MMOL/L (ref 21–32)
CO2: 27 MMOL/L (ref 21–32)
CREAT SERPL-MCNC: 0.8 MG/DL (ref 0.6–1.1)
CREAT SERPL-MCNC: 0.8 MG/DL (ref 0.6–1.1)
EKG ATRIAL RATE: 111 BPM
EKG ATRIAL RATE: 63 BPM
EKG ATRIAL RATE: 87 BPM
EKG DIAGNOSIS: NORMAL
EKG P AXIS: 79 DEGREES
EKG P AXIS: 84 DEGREES
EKG P AXIS: 90 DEGREES
EKG P-R INTERVAL: 108 MS
EKG P-R INTERVAL: 112 MS
EKG P-R INTERVAL: 114 MS
EKG Q-T INTERVAL: 340 MS
EKG Q-T INTERVAL: 436 MS
EKG Q-T INTERVAL: 480 MS
EKG QRS DURATION: 64 MS
EKG QRS DURATION: 68 MS
EKG QRS DURATION: 72 MS
EKG QTC CALCULATION (BAZETT): 462 MS
EKG QTC CALCULATION (BAZETT): 491 MS
EKG QTC CALCULATION (BAZETT): 524 MS
EKG R AXIS: 69 DEGREES
EKG R AXIS: 70 DEGREES
EKG R AXIS: 81 DEGREES
EKG T AXIS: 134 DEGREES
EKG T AXIS: 85 DEGREES
EKG T AXIS: 87 DEGREES
EKG VENTRICULAR RATE: 111 BPM
EKG VENTRICULAR RATE: 63 BPM
EKG VENTRICULAR RATE: 87 BPM
GFR SERPL CREATININE-BSD FRML MDRD: >60 ML/MIN/1.73M2
GFR SERPL CREATININE-BSD FRML MDRD: >60 ML/MIN/1.73M2
GLUCOSE SERPL-MCNC: 128 MG/DL (ref 70–99)
GLUCOSE SERPL-MCNC: 99 MG/DL (ref 70–99)
HCT VFR BLD CALC: 40.5 % (ref 37–47)
HEMOGLOBIN: 12.9 GM/DL (ref 12.5–16)
MCH RBC QN AUTO: 32.3 PG (ref 27–31)
MCHC RBC AUTO-ENTMCNC: 31.9 % (ref 32–36)
MCV RBC AUTO: 101.5 FL (ref 78–100)
PDW BLD-RTO: 12.4 % (ref 11.7–14.9)
PLATELET # BLD: 220 K/CU MM (ref 140–440)
PMV BLD AUTO: 10.4 FL (ref 7.5–11.1)
POTASSIUM SERPL-SCNC: 3.9 MMOL/L (ref 3.5–5.1)
POTASSIUM SERPL-SCNC: 4.4 MMOL/L (ref 3.5–5.1)
RBC # BLD: 3.99 M/CU MM (ref 4.2–5.4)
SODIUM BLD-SCNC: 138 MMOL/L (ref 135–145)
SODIUM BLD-SCNC: 140 MMOL/L (ref 135–145)
TOTAL PROTEIN: 5.1 GM/DL (ref 6.4–8.2)
TROPONIN, HIGH SENSITIVITY: 53 NG/L (ref 0–14)
WBC # BLD: 19.2 K/CU MM (ref 4–10.5)

## 2024-03-17 PROCEDURE — 94761 N-INVAS EAR/PLS OXIMETRY MLT: CPT

## 2024-03-17 PROCEDURE — 6370000000 HC RX 637 (ALT 250 FOR IP): Performed by: STUDENT IN AN ORGANIZED HEALTH CARE EDUCATION/TRAINING PROGRAM

## 2024-03-17 PROCEDURE — 2580000003 HC RX 258: Performed by: STUDENT IN AN ORGANIZED HEALTH CARE EDUCATION/TRAINING PROGRAM

## 2024-03-17 PROCEDURE — 99253 IP/OBS CNSLTJ NEW/EST LOW 45: CPT | Performed by: INTERNAL MEDICINE

## 2024-03-17 PROCEDURE — 96372 THER/PROPH/DIAG INJ SC/IM: CPT

## 2024-03-17 PROCEDURE — 84484 ASSAY OF TROPONIN QUANT: CPT

## 2024-03-17 PROCEDURE — 85027 COMPLETE CBC AUTOMATED: CPT

## 2024-03-17 PROCEDURE — 6360000002 HC RX W HCPCS: Performed by: STUDENT IN AN ORGANIZED HEALTH CARE EDUCATION/TRAINING PROGRAM

## 2024-03-17 PROCEDURE — 80048 BASIC METABOLIC PNL TOTAL CA: CPT

## 2024-03-17 PROCEDURE — 80053 COMPREHEN METABOLIC PANEL: CPT

## 2024-03-17 PROCEDURE — 94667 MNPJ CHEST WALL 1ST: CPT

## 2024-03-17 PROCEDURE — 2700000000 HC OXYGEN THERAPY PER DAY

## 2024-03-17 PROCEDURE — 93010 ELECTROCARDIOGRAM REPORT: CPT | Performed by: INTERNAL MEDICINE

## 2024-03-17 PROCEDURE — 36415 COLL VENOUS BLD VENIPUNCTURE: CPT

## 2024-03-17 PROCEDURE — 6370000000 HC RX 637 (ALT 250 FOR IP): Performed by: NURSE PRACTITIONER

## 2024-03-17 PROCEDURE — 94640 AIRWAY INHALATION TREATMENT: CPT

## 2024-03-17 PROCEDURE — 6360000002 HC RX W HCPCS: Performed by: NURSE PRACTITIONER

## 2024-03-17 PROCEDURE — G0378 HOSPITAL OBSERVATION PER HR: HCPCS

## 2024-03-17 PROCEDURE — 93005 ELECTROCARDIOGRAM TRACING: CPT | Performed by: STUDENT IN AN ORGANIZED HEALTH CARE EDUCATION/TRAINING PROGRAM

## 2024-03-17 RX ORDER — AZITHROMYCIN 250 MG/1
500 TABLET, FILM COATED ORAL EVERY EVENING
Status: COMPLETED | OUTPATIENT
Start: 2024-03-17 | End: 2024-03-18

## 2024-03-17 RX ORDER — ACETYLCYSTEINE 200 MG/ML
4 SOLUTION ORAL; RESPIRATORY (INHALATION)
Status: DISCONTINUED | OUTPATIENT
Start: 2024-03-17 | End: 2024-03-17

## 2024-03-17 RX ORDER — IPRATROPIUM BROMIDE AND ALBUTEROL SULFATE 2.5; .5 MG/3ML; MG/3ML
1 SOLUTION RESPIRATORY (INHALATION) EVERY 4 HOURS PRN
Status: DISCONTINUED | OUTPATIENT
Start: 2024-03-17 | End: 2024-03-20 | Stop reason: HOSPADM

## 2024-03-17 RX ORDER — CYCLOBENZAPRINE HCL 10 MG
10 TABLET ORAL 3 TIMES DAILY PRN
Status: DISCONTINUED | OUTPATIENT
Start: 2024-03-17 | End: 2024-03-20 | Stop reason: HOSPADM

## 2024-03-17 RX ADMIN — BENZONATATE 100 MG: 100 CAPSULE ORAL at 21:52

## 2024-03-17 RX ADMIN — CYCLOBENZAPRINE 10 MG: 10 TABLET, FILM COATED ORAL at 03:56

## 2024-03-17 RX ADMIN — IPRATROPIUM BROMIDE AND ALBUTEROL SULFATE 1 DOSE: 2.5; .5 SOLUTION RESPIRATORY (INHALATION) at 04:13

## 2024-03-17 RX ADMIN — ASPIRIN 81 MG: 81 TABLET, CHEWABLE ORAL at 08:51

## 2024-03-17 RX ADMIN — SODIUM CHLORIDE, PRESERVATIVE FREE 10 ML: 5 INJECTION INTRAVENOUS at 21:52

## 2024-03-17 RX ADMIN — ALBUTEROL SULFATE 2.5 MG: 2.5 SOLUTION RESPIRATORY (INHALATION) at 15:04

## 2024-03-17 RX ADMIN — GABAPENTIN 600 MG: 300 CAPSULE ORAL at 08:50

## 2024-03-17 RX ADMIN — AZITHROMYCIN DIHYDRATE 500 MG: 250 TABLET ORAL at 18:28

## 2024-03-17 RX ADMIN — ENOXAPARIN SODIUM 30 MG: 100 INJECTION SUBCUTANEOUS at 18:28

## 2024-03-17 RX ADMIN — ACETAMINOPHEN 650 MG: 325 TABLET ORAL at 21:51

## 2024-03-17 RX ADMIN — TIOTROPIUM BROMIDE AND OLODATEROL 2 PUFF: 3.124; 2.736 SPRAY, METERED RESPIRATORY (INHALATION) at 08:00

## 2024-03-17 RX ADMIN — ATORVASTATIN CALCIUM 40 MG: 40 TABLET, FILM COATED ORAL at 21:51

## 2024-03-17 RX ADMIN — CLOPIDOGREL BISULFATE 75 MG: 75 TABLET ORAL at 08:50

## 2024-03-17 RX ADMIN — ACETYLCYSTEINE 800 MG: 200 SOLUTION ORAL; RESPIRATORY (INHALATION) at 15:04

## 2024-03-17 RX ADMIN — PREDNISONE 40 MG: 20 TABLET ORAL at 08:53

## 2024-03-17 RX ADMIN — CYCLOBENZAPRINE 10 MG: 10 TABLET, FILM COATED ORAL at 21:51

## 2024-03-17 RX ADMIN — SODIUM CHLORIDE, PRESERVATIVE FREE 10 ML: 5 INJECTION INTRAVENOUS at 08:50

## 2024-03-17 RX ADMIN — METOPROLOL TARTRATE 25 MG: 25 TABLET, FILM COATED ORAL at 21:52

## 2024-03-17 RX ADMIN — PANTOPRAZOLE SODIUM 40 MG: 40 TABLET, DELAYED RELEASE ORAL at 06:06

## 2024-03-17 RX ADMIN — ACETAMINOPHEN 650 MG: 325 TABLET ORAL at 06:06

## 2024-03-17 RX ADMIN — METOPROLOL TARTRATE 25 MG: 25 TABLET, FILM COATED ORAL at 08:50

## 2024-03-17 RX ADMIN — AMLODIPINE BESYLATE 5 MG: 5 TABLET ORAL at 08:50

## 2024-03-17 ASSESSMENT — PAIN DESCRIPTION - DESCRIPTORS
DESCRIPTORS: CRAMPING
DESCRIPTORS: ACHING

## 2024-03-17 ASSESSMENT — PAIN DESCRIPTION - LOCATION
LOCATION: HEAD
LOCATION: LEG

## 2024-03-17 ASSESSMENT — PAIN DESCRIPTION - ORIENTATION: ORIENTATION: RIGHT;LEFT

## 2024-03-17 ASSESSMENT — PAIN SCALES - GENERAL
PAINLEVEL_OUTOF10: 7
PAINLEVEL_OUTOF10: 3
PAINLEVEL_OUTOF10: 3
PAINLEVEL_OUTOF10: 7

## 2024-03-17 NOTE — CONSULTS
CARDIOLOGY CONSULT NOTE   Reason for consultation:  troponin elevation/ chest pain     Referring physician:  Juan Daniel Lara MD     Primary care physician: Sharyn Delatorre, APRN - CNP      Dear  Dr. Juan Daniel Lara MD   Thanks for the consult.    Chief Complaints :No chief complaint on file.       History of present illness:Kate is a 63 y.o.year old who presents with chest tightness heaviness she has a lot of head congestion and also sore throat pain on the left side she was evaluated by the emergency department at [facility] was transferred for further evaluation she states the pain is intermittent radiates down her left arm some shortness of breath she has been coughing andis also throat is also sore.  She does smoke she had a cardiac cath in January 2024 completed by myself where she was noted to have diffuse irregular LAD disease with multiple tandem lesion she also had calcified lesion of the circumflex and OM branches about 40 to 50% and RCA was also diffusely diseased , and irregular with small vessel tortuous heavily calcified with multiple 30 to 40% lesions ramus also had disease.  Medical management was recommended troponins were slightly elevated at 84 right now she has constant pain and pressure-like sensation    Past medical history:    has a past medical history of Salas's esophagus, Elevated troponin, Emphysema of lung (HCC), H/O Doppler ultrasound carotid, H/O echocardiogram, H/O exercise stress test, History of Holter monitoring, History of nuclear stress test, HLD (hyperlipidemia), Hypertension, NSTEMI (non-ST elevated myocardial infarction) (HCC), Pulmonary emphysema (HCC), and TIA (transient ischemic attack).  Past surgical history:   has a past surgical history that includes Appendectomy; Colonoscopy; Colonoscopy (N/A, 6/8/2023); Upper gastrointestinal endoscopy (N/A, 6/8/2023); and Cardiac procedure (N/A, 1/24/2024).  Social History:   reports that she has been smoking cigarettes. She has

## 2024-03-18 ENCOUNTER — APPOINTMENT (OUTPATIENT)
Dept: NON INVASIVE DIAGNOSTICS | Age: 64
End: 2024-03-18
Attending: STUDENT IN AN ORGANIZED HEALTH CARE EDUCATION/TRAINING PROGRAM
Payer: MEDICAID

## 2024-03-18 LAB
CULTURE: NORMAL
ECHO AR MAX VEL PISA: 4 M/S
ECHO AV REGURGITANT PHT: 577 MS
ECHO BSA: 1.18 M2
ECHO EST RA PRESSURE: 3 MMHG
ECHO IVC PROX: 1.1 CM
ECHO LA AREA 4C: 13.8 CM2
ECHO LA DIAMETER INDEX: 2.23 CM/M2
ECHO LA DIAMETER: 2.7 CM
ECHO LA MAJOR AXIS: 5 CM
ECHO LA VOL MOD A4C: 31 ML (ref 22–52)
ECHO LA VOLUME INDEX MOD A4C: 26 ML/M2 (ref 16–34)
ECHO LV EDV A4C: 30 ML
ECHO LV EDV INDEX A4C: 25 ML/M2
ECHO LV EJECTION FRACTION A4C: 55 %
ECHO LV ESV A4C: 14 ML
ECHO LV ESV INDEX A4C: 12 ML/M2
ECHO LV FRACTIONAL SHORTENING: 45 % (ref 28–44)
ECHO LV INTERNAL DIMENSION DIASTOLE INDEX: 3.14 CM/M2
ECHO LV INTERNAL DIMENSION DIASTOLIC: 3.8 CM (ref 3.9–5.3)
ECHO LV INTERNAL DIMENSION SYSTOLIC INDEX: 1.74 CM/M2
ECHO LV INTERNAL DIMENSION SYSTOLIC: 2.1 CM
ECHO LV IVSD: 0.8 CM (ref 0.6–0.9)
ECHO LV MASS 2D: 101.1 G (ref 67–162)
ECHO LV MASS INDEX 2D: 83.5 G/M2 (ref 43–95)
ECHO LV POSTERIOR WALL DIASTOLIC: 1 CM (ref 0.6–0.9)
ECHO LV RELATIVE WALL THICKNESS RATIO: 0.53
ECHO RIGHT VENTRICULAR SYSTOLIC PRESSURE (RVSP): 44 MMHG
ECHO TV REGURGITANT MAX VELOCITY: 3.22 M/S
ECHO TV REGURGITANT PEAK GRADIENT: 41 MMHG
Lab: NORMAL
SPECIMEN: NORMAL

## 2024-03-18 PROCEDURE — 93308 TTE F-UP OR LMTD: CPT

## 2024-03-18 PROCEDURE — 6370000000 HC RX 637 (ALT 250 FOR IP): Performed by: NURSE PRACTITIONER

## 2024-03-18 PROCEDURE — G0378 HOSPITAL OBSERVATION PER HR: HCPCS

## 2024-03-18 PROCEDURE — 94669 MECHANICAL CHEST WALL OSCILL: CPT

## 2024-03-18 PROCEDURE — 6360000002 HC RX W HCPCS: Performed by: STUDENT IN AN ORGANIZED HEALTH CARE EDUCATION/TRAINING PROGRAM

## 2024-03-18 PROCEDURE — 96372 THER/PROPH/DIAG INJ SC/IM: CPT

## 2024-03-18 PROCEDURE — APPNB30 APP NON BILLABLE TIME 0-30 MINS

## 2024-03-18 PROCEDURE — 6370000000 HC RX 637 (ALT 250 FOR IP)

## 2024-03-18 PROCEDURE — 94640 AIRWAY INHALATION TREATMENT: CPT

## 2024-03-18 PROCEDURE — 6370000000 HC RX 637 (ALT 250 FOR IP): Performed by: STUDENT IN AN ORGANIZED HEALTH CARE EDUCATION/TRAINING PROGRAM

## 2024-03-18 PROCEDURE — 94664 DEMO&/EVAL PT USE INHALER: CPT

## 2024-03-18 PROCEDURE — 2580000003 HC RX 258: Performed by: STUDENT IN AN ORGANIZED HEALTH CARE EDUCATION/TRAINING PROGRAM

## 2024-03-18 PROCEDURE — 93308 TTE F-UP OR LMTD: CPT | Performed by: INTERNAL MEDICINE

## 2024-03-18 PROCEDURE — 6360000002 HC RX W HCPCS: Performed by: NURSE PRACTITIONER

## 2024-03-18 PROCEDURE — 93325 DOPPLER ECHO COLOR FLOW MAPG: CPT | Performed by: INTERNAL MEDICINE

## 2024-03-18 PROCEDURE — 6370000000 HC RX 637 (ALT 250 FOR IP): Performed by: FAMILY MEDICINE

## 2024-03-18 PROCEDURE — 2700000000 HC OXYGEN THERAPY PER DAY

## 2024-03-18 PROCEDURE — 93321 DOPPLER ECHO F-UP/LMTD STD: CPT | Performed by: INTERNAL MEDICINE

## 2024-03-18 PROCEDURE — 94761 N-INVAS EAR/PLS OXIMETRY MLT: CPT

## 2024-03-18 RX ORDER — FAMOTIDINE 20 MG/1
20 TABLET, FILM COATED ORAL DAILY
Status: DISCONTINUED | OUTPATIENT
Start: 2024-03-18 | End: 2024-03-18

## 2024-03-18 RX ORDER — NICOTINE 21 MG/24HR
1 PATCH, TRANSDERMAL 24 HOURS TRANSDERMAL DAILY
Status: DISCONTINUED | OUTPATIENT
Start: 2024-03-18 | End: 2024-03-20 | Stop reason: HOSPADM

## 2024-03-18 RX ORDER — FAMOTIDINE 20 MG/1
20 TABLET, FILM COATED ORAL 2 TIMES DAILY
Status: DISCONTINUED | OUTPATIENT
Start: 2024-03-18 | End: 2024-03-20 | Stop reason: HOSPADM

## 2024-03-18 RX ORDER — ROPINIROLE 0.25 MG/1
0.25 TABLET, FILM COATED ORAL NIGHTLY
Status: DISCONTINUED | OUTPATIENT
Start: 2024-03-18 | End: 2024-03-20 | Stop reason: HOSPADM

## 2024-03-18 RX ORDER — ALBUTEROL SULFATE 2.5 MG/3ML
2.5 SOLUTION RESPIRATORY (INHALATION)
Status: DISCONTINUED | OUTPATIENT
Start: 2024-03-19 | End: 2024-03-19

## 2024-03-18 RX ORDER — ACETYLCYSTEINE 100 MG/ML
4 SOLUTION ORAL; RESPIRATORY (INHALATION)
Status: DISCONTINUED | OUTPATIENT
Start: 2024-03-18 | End: 2024-03-20 | Stop reason: HOSPADM

## 2024-03-18 RX ADMIN — METOPROLOL TARTRATE 25 MG: 25 TABLET, FILM COATED ORAL at 20:43

## 2024-03-18 RX ADMIN — AZITHROMYCIN DIHYDRATE 500 MG: 250 TABLET ORAL at 17:57

## 2024-03-18 RX ADMIN — TIOTROPIUM BROMIDE AND OLODATEROL 2 PUFF: 3.124; 2.736 SPRAY, METERED RESPIRATORY (INHALATION) at 09:47

## 2024-03-18 RX ADMIN — ATORVASTATIN CALCIUM 40 MG: 40 TABLET, FILM COATED ORAL at 20:43

## 2024-03-18 RX ADMIN — METOPROLOL TARTRATE 25 MG: 25 TABLET, FILM COATED ORAL at 08:08

## 2024-03-18 RX ADMIN — ASPIRIN 81 MG: 81 TABLET, CHEWABLE ORAL at 08:08

## 2024-03-18 RX ADMIN — ROPINIROLE HYDROCHLORIDE 0.25 MG: 0.25 TABLET, FILM COATED ORAL at 02:20

## 2024-03-18 RX ADMIN — ACETAMINOPHEN 650 MG: 325 TABLET ORAL at 17:57

## 2024-03-18 RX ADMIN — IPRATROPIUM BROMIDE AND ALBUTEROL SULFATE 1 DOSE: 2.5; .5 SOLUTION RESPIRATORY (INHALATION) at 20:10

## 2024-03-18 RX ADMIN — CLOPIDOGREL BISULFATE 75 MG: 75 TABLET ORAL at 08:08

## 2024-03-18 RX ADMIN — CYCLOBENZAPRINE 10 MG: 10 TABLET, FILM COATED ORAL at 22:22

## 2024-03-18 RX ADMIN — AMLODIPINE BESYLATE 5 MG: 5 TABLET ORAL at 08:08

## 2024-03-18 RX ADMIN — FAMOTIDINE 20 MG: 20 TABLET ORAL at 20:43

## 2024-03-18 RX ADMIN — ACETYLCYSTEINE 400 MG: 100 INHALANT RESPIRATORY (INHALATION) at 20:15

## 2024-03-18 RX ADMIN — GABAPENTIN 600 MG: 300 CAPSULE ORAL at 08:08

## 2024-03-18 RX ADMIN — SODIUM CHLORIDE, PRESERVATIVE FREE 10 ML: 5 INJECTION INTRAVENOUS at 08:08

## 2024-03-18 RX ADMIN — FAMOTIDINE 20 MG: 20 TABLET ORAL at 09:45

## 2024-03-18 RX ADMIN — ENOXAPARIN SODIUM 30 MG: 100 INJECTION SUBCUTANEOUS at 17:57

## 2024-03-18 RX ADMIN — ROPINIROLE HYDROCHLORIDE 0.25 MG: 0.25 TABLET, FILM COATED ORAL at 20:42

## 2024-03-18 RX ADMIN — PREDNISONE 40 MG: 20 TABLET ORAL at 08:08

## 2024-03-18 RX ADMIN — PANTOPRAZOLE SODIUM 40 MG: 40 TABLET, DELAYED RELEASE ORAL at 06:52

## 2024-03-18 RX ADMIN — SODIUM CHLORIDE, PRESERVATIVE FREE 10 ML: 5 INJECTION INTRAVENOUS at 20:46

## 2024-03-18 ASSESSMENT — PAIN DESCRIPTION - ONSET: ONSET: ON-GOING

## 2024-03-18 ASSESSMENT — PAIN DESCRIPTION - DESCRIPTORS
DESCRIPTORS: ACHING
DESCRIPTORS: ACHING;SPASM

## 2024-03-18 ASSESSMENT — PAIN SCALES - GENERAL
PAINLEVEL_OUTOF10: 3
PAINLEVEL_OUTOF10: 4
PAINLEVEL_OUTOF10: 1

## 2024-03-18 ASSESSMENT — PAIN DESCRIPTION - ORIENTATION
ORIENTATION: MID
ORIENTATION: RIGHT;LEFT

## 2024-03-18 ASSESSMENT — PAIN DESCRIPTION - PAIN TYPE: TYPE: CHRONIC PAIN

## 2024-03-18 ASSESSMENT — PAIN DESCRIPTION - LOCATION
LOCATION: HEAD
LOCATION: LEG

## 2024-03-18 ASSESSMENT — PAIN DESCRIPTION - FREQUENCY: FREQUENCY: INTERMITTENT

## 2024-03-18 NOTE — CARE COORDINATION
03/18/24 1410   Service Assessment   Patient Orientation Other (see comment)  (patient was resting with eyes closed and did not respond to CM knocks on the door or calls of her name)   History Provided By Medical Record   Primary Caregiver Self   Support Systems Family Members   Patient's Healthcare Decision Maker is: Legal Next of Kin   PCP Verified by CM No   Prior Functional Level   (patient appears independent from chart review)     CM attempted to meet with the patient at bedside.  Upon chart review, CM charted 2/5/24  \"Pt lives with son and his girlfriend. Pt has been actively pursuing senior housing and felt she had an opportunity and then they gave the apartment to someone else. Pt does not have a phone and must rely on her son for use of a phone. Pt shared that she sleeps on a couch. The Apartment is on the third floor and she has difficulty going up the 37 stairs.   Plan at this time is for her to return to her sons apartment and to continue to pursue her own place.\"  Patient has a PCP and pending medicaid insurance. CM is available if needs arise.

## 2024-03-19 LAB
ANION GAP SERPL CALCULATED.3IONS-SCNC: 9 MMOL/L (ref 7–16)
BASOPHILS ABSOLUTE: 0 K/CU MM
BASOPHILS RELATIVE PERCENT: 0 % (ref 0–1)
BUN SERPL-MCNC: 25 MG/DL (ref 6–23)
CALCIUM SERPL-MCNC: 8.5 MG/DL (ref 8.3–10.6)
CHLORIDE BLD-SCNC: 103 MMOL/L (ref 99–110)
CO2: 23 MMOL/L (ref 21–32)
CREAT SERPL-MCNC: 0.8 MG/DL (ref 0.6–1.1)
DIFFERENTIAL TYPE: ABNORMAL
EOSINOPHILS ABSOLUTE: 0 K/CU MM
EOSINOPHILS RELATIVE PERCENT: 0 % (ref 0–3)
GFR SERPL CREATININE-BSD FRML MDRD: >60 ML/MIN/1.73M2
GLUCOSE SERPL-MCNC: 123 MG/DL (ref 70–99)
HCT VFR BLD CALC: 42.1 % (ref 37–47)
HEMOGLOBIN: 14.2 GM/DL (ref 12.5–16)
IMMATURE NEUTROPHIL %: 0.7 % (ref 0–0.43)
LYMPHOCYTES ABSOLUTE: 0.7 K/CU MM
LYMPHOCYTES RELATIVE PERCENT: 6.9 % (ref 24–44)
MCH RBC QN AUTO: 32.6 PG (ref 27–31)
MCHC RBC AUTO-ENTMCNC: 33.7 % (ref 32–36)
MCV RBC AUTO: 96.6 FL (ref 78–100)
MONOCYTES ABSOLUTE: 0.2 K/CU MM
MONOCYTES RELATIVE PERCENT: 1.5 % (ref 0–4)
NUCLEATED RBC %: 0 %
PDW BLD-RTO: 12.2 % (ref 11.7–14.9)
PLATELET # BLD: 260 K/CU MM (ref 140–440)
PMV BLD AUTO: 10.7 FL (ref 7.5–11.1)
POTASSIUM SERPL-SCNC: 4.4 MMOL/L (ref 3.5–5.1)
RBC # BLD: 4.36 M/CU MM (ref 4.2–5.4)
SEGMENTED NEUTROPHILS ABSOLUTE COUNT: 9.4 K/CU MM
SEGMENTED NEUTROPHILS RELATIVE PERCENT: 90.9 % (ref 36–66)
SODIUM BLD-SCNC: 135 MMOL/L (ref 135–145)
TOTAL IMMATURE NEUTOROPHIL: 0.07 K/CU MM
TOTAL NUCLEATED RBC: 0 K/CU MM
WBC # BLD: 10.4 K/CU MM (ref 4–10.5)

## 2024-03-19 PROCEDURE — 94664 DEMO&/EVAL PT USE INHALER: CPT

## 2024-03-19 PROCEDURE — 6370000000 HC RX 637 (ALT 250 FOR IP): Performed by: NURSE PRACTITIONER

## 2024-03-19 PROCEDURE — G0378 HOSPITAL OBSERVATION PER HR: HCPCS

## 2024-03-19 PROCEDURE — 6370000000 HC RX 637 (ALT 250 FOR IP)

## 2024-03-19 PROCEDURE — 6370000000 HC RX 637 (ALT 250 FOR IP): Performed by: STUDENT IN AN ORGANIZED HEALTH CARE EDUCATION/TRAINING PROGRAM

## 2024-03-19 PROCEDURE — 2580000003 HC RX 258: Performed by: STUDENT IN AN ORGANIZED HEALTH CARE EDUCATION/TRAINING PROGRAM

## 2024-03-19 PROCEDURE — 80048 BASIC METABOLIC PNL TOTAL CA: CPT

## 2024-03-19 PROCEDURE — 94761 N-INVAS EAR/PLS OXIMETRY MLT: CPT

## 2024-03-19 PROCEDURE — 6360000002 HC RX W HCPCS: Performed by: INTERNAL MEDICINE

## 2024-03-19 PROCEDURE — 6370000000 HC RX 637 (ALT 250 FOR IP): Performed by: FAMILY MEDICINE

## 2024-03-19 PROCEDURE — 94669 MECHANICAL CHEST WALL OSCILL: CPT

## 2024-03-19 PROCEDURE — 6360000002 HC RX W HCPCS: Performed by: FAMILY MEDICINE

## 2024-03-19 PROCEDURE — 94640 AIRWAY INHALATION TREATMENT: CPT

## 2024-03-19 PROCEDURE — 85025 COMPLETE CBC W/AUTO DIFF WBC: CPT

## 2024-03-19 RX ORDER — ALBUTEROL SULFATE 2.5 MG/3ML
2.5 SOLUTION RESPIRATORY (INHALATION)
Status: DISCONTINUED | OUTPATIENT
Start: 2024-03-19 | End: 2024-03-20 | Stop reason: HOSPADM

## 2024-03-19 RX ADMIN — ATORVASTATIN CALCIUM 40 MG: 40 TABLET, FILM COATED ORAL at 19:46

## 2024-03-19 RX ADMIN — ROPINIROLE HYDROCHLORIDE 0.25 MG: 0.25 TABLET, FILM COATED ORAL at 23:56

## 2024-03-19 RX ADMIN — ACETAMINOPHEN 650 MG: 325 TABLET ORAL at 08:15

## 2024-03-19 RX ADMIN — ACETAMINOPHEN 650 MG: 325 TABLET ORAL at 15:43

## 2024-03-19 RX ADMIN — METOPROLOL TARTRATE 25 MG: 25 TABLET, FILM COATED ORAL at 19:46

## 2024-03-19 RX ADMIN — PREDNISONE 40 MG: 20 TABLET ORAL at 08:04

## 2024-03-19 RX ADMIN — PANTOPRAZOLE SODIUM 40 MG: 40 TABLET, DELAYED RELEASE ORAL at 06:25

## 2024-03-19 RX ADMIN — CYCLOBENZAPRINE 10 MG: 10 TABLET, FILM COATED ORAL at 22:02

## 2024-03-19 RX ADMIN — ACETAMINOPHEN 650 MG: 325 TABLET ORAL at 22:02

## 2024-03-19 RX ADMIN — AMLODIPINE BESYLATE 5 MG: 5 TABLET ORAL at 08:03

## 2024-03-19 RX ADMIN — ALBUTEROL SULFATE 2.5 MG: 2.5 SOLUTION RESPIRATORY (INHALATION) at 19:40

## 2024-03-19 RX ADMIN — SODIUM CHLORIDE, PRESERVATIVE FREE 10 ML: 5 INJECTION INTRAVENOUS at 19:49

## 2024-03-19 RX ADMIN — ALBUTEROL SULFATE 2.5 MG: 2.5 SOLUTION RESPIRATORY (INHALATION) at 09:12

## 2024-03-19 RX ADMIN — FAMOTIDINE 20 MG: 20 TABLET ORAL at 19:46

## 2024-03-19 RX ADMIN — METOPROLOL TARTRATE 25 MG: 25 TABLET, FILM COATED ORAL at 08:03

## 2024-03-19 RX ADMIN — TIOTROPIUM BROMIDE AND OLODATEROL 2 PUFF: 3.124; 2.736 SPRAY, METERED RESPIRATORY (INHALATION) at 09:19

## 2024-03-19 RX ADMIN — ASPIRIN 81 MG: 81 TABLET, CHEWABLE ORAL at 08:03

## 2024-03-19 RX ADMIN — SODIUM CHLORIDE, PRESERVATIVE FREE 10 ML: 5 INJECTION INTRAVENOUS at 08:04

## 2024-03-19 RX ADMIN — GABAPENTIN 600 MG: 300 CAPSULE ORAL at 08:04

## 2024-03-19 RX ADMIN — CLOPIDOGREL BISULFATE 75 MG: 75 TABLET ORAL at 08:03

## 2024-03-19 RX ADMIN — FAMOTIDINE 20 MG: 20 TABLET ORAL at 08:04

## 2024-03-19 ASSESSMENT — PAIN DESCRIPTION - FREQUENCY
FREQUENCY: CONTINUOUS
FREQUENCY: CONTINUOUS

## 2024-03-19 ASSESSMENT — PAIN DESCRIPTION - PAIN TYPE
TYPE: ACUTE PAIN
TYPE: ACUTE PAIN

## 2024-03-19 ASSESSMENT — PAIN DESCRIPTION - LOCATION
LOCATION: HEAD
LOCATION: LEG
LOCATION: HEAD

## 2024-03-19 ASSESSMENT — PAIN DESCRIPTION - ORIENTATION
ORIENTATION: MID;ANTERIOR
ORIENTATION: MID;ANTERIOR
ORIENTATION: RIGHT;LEFT

## 2024-03-19 ASSESSMENT — PAIN SCALES - GENERAL
PAINLEVEL_OUTOF10: 0
PAINLEVEL_OUTOF10: 8
PAINLEVEL_OUTOF10: 3
PAINLEVEL_OUTOF10: 3

## 2024-03-19 ASSESSMENT — PAIN DESCRIPTION - ONSET
ONSET: ON-GOING
ONSET: ON-GOING

## 2024-03-19 ASSESSMENT — PAIN DESCRIPTION - DESCRIPTORS
DESCRIPTORS: PRESSURE
DESCRIPTORS: ACHING
DESCRIPTORS: PRESSURE

## 2024-03-19 NOTE — PLAN OF CARE
Problem: Discharge Planning  Goal: Discharge to home or other facility with appropriate resources  3/19/2024 0221 by Kalina Qiu RN  Outcome: Progressing  3/18/2024 1306 by Karin Guidry RN  Outcome: Progressing  Flowsheets (Taken 3/18/2024 0805)  Discharge to home or other facility with appropriate resources:   Identify barriers to discharge with patient and caregiver   Arrange for needed discharge resources and transportation as appropriate   Identify discharge learning needs (meds, wound care, etc)   Refer to discharge planning if patient needs post-hospital services based on physician order or complex needs related to functional status, cognitive ability or social support system     Problem: Safety - Adult  Goal: Free from fall injury  3/19/2024 0221 by Kalina Qiu RN  Outcome: Progressing  3/18/2024 1306 by Karin Guidry RN  Outcome: Progressing  Flowsheets (Taken 3/18/2024 0805)  Free From Fall Injury:   Instruct family/caregiver on patient safety   Based on caregiver fall risk screen, instruct family/caregiver to ask for assistance with transferring infant if caregiver noted to have fall risk factors     Problem: Pain  Goal: Verbalizes/displays adequate comfort level or baseline comfort level  3/19/2024 0221 by Kalina Qiu RN  Outcome: Progressing  Flowsheets (Taken 3/18/2024 1445 by Karin Guidry RN)  Verbalizes/displays adequate comfort level or baseline comfort level:   Encourage patient to monitor pain and request assistance   Assess pain using appropriate pain scale   Administer analgesics based on type and severity of pain and evaluate response  3/18/2024 1306 by Karin Guidry RN  Outcome: Progressing  Flowsheets (Taken 3/18/2024 0800)  Verbalizes/displays adequate comfort level or baseline comfort level:   Assess pain using appropriate pain scale   Encourage patient to monitor pain and request assistance   Administer analgesics based on type and severity of pain 
  Problem: Discharge Planning  Goal: Discharge to home or other facility with appropriate resources  3/19/2024 0839 by Karin Guidry RN  Outcome: Progressing  Flowsheets (Taken 3/19/2024 0837)  Discharge to home or other facility with appropriate resources:   Identify barriers to discharge with patient and caregiver   Arrange for needed discharge resources and transportation as appropriate   Identify discharge learning needs (meds, wound care, etc)   Refer to discharge planning if patient needs post-hospital services based on physician order or complex needs related to functional status, cognitive ability or social support system  3/19/2024 0221 by Kalina Qiu RN  Outcome: Progressing     Problem: Safety - Adult  Goal: Free from fall injury  3/19/2024 0839 by Karin Guidry RN  Outcome: Progressing  Flowsheets (Taken 3/19/2024 0802)  Free From Fall Injury:   Instruct family/caregiver on patient safety   Based on caregiver fall risk screen, instruct family/caregiver to ask for assistance with transferring infant if caregiver noted to have fall risk factors  3/19/2024 0221 by Kalina Qiu RN  Outcome: Progressing     Problem: Pain  Goal: Verbalizes/displays adequate comfort level or baseline comfort level  3/19/2024 0839 by Karin Guidry RN  Outcome: Progressing  Flowsheets (Taken 3/19/2024 0750)  Verbalizes/displays adequate comfort level or baseline comfort level:   Encourage patient to monitor pain and request assistance   Assess pain using appropriate pain scale   Administer analgesics based on type and severity of pain and evaluate response  3/19/2024 0221 by Kalina Qiu RN  Outcome: Progressing  Flowsheets (Taken 3/18/2024 1445 by Karin Guidry RN)  Verbalizes/displays adequate comfort level or baseline comfort level:   Encourage patient to monitor pain and request assistance   Assess pain using appropriate pain scale   Administer analgesics based on type and severity of pain 
  Problem: Discharge Planning  Goal: Discharge to home or other facility with appropriate resources  Outcome: Progressing  Flowsheets (Taken 3/18/2024 0805)  Discharge to home or other facility with appropriate resources:   Identify barriers to discharge with patient and caregiver   Arrange for needed discharge resources and transportation as appropriate   Identify discharge learning needs (meds, wound care, etc)   Refer to discharge planning if patient needs post-hospital services based on physician order or complex needs related to functional status, cognitive ability or social support system     Problem: Safety - Adult  Goal: Free from fall injury  Outcome: Progressing  Flowsheets (Taken 3/18/2024 0805)  Free From Fall Injury:   Instruct family/caregiver on patient safety   Based on caregiver fall risk screen, instruct family/caregiver to ask for assistance with transferring infant if caregiver noted to have fall risk factors     Problem: Pain  Goal: Verbalizes/displays adequate comfort level or baseline comfort level  Outcome: Progressing  Flowsheets (Taken 3/18/2024 0800)  Verbalizes/displays adequate comfort level or baseline comfort level:   Assess pain using appropriate pain scale   Encourage patient to monitor pain and request assistance   Administer analgesics based on type and severity of pain and evaluate response     
Assess pain using appropriate pain scale   Administer analgesics based on type and severity of pain and evaluate response  3/16/2024 2051 by Eusebia Montgomery, RN  Outcome: Progressing

## 2024-03-19 NOTE — RT PROTOCOL NOTE
RT Nebulizer Bronchodilator Protocol Note    There is a bronchodilator order in the chart from a provider indicating to follow the RT Bronchodilator Protocol and there is an “Initiate RT Bronchodilator Protocol” order as well (see protocol at bottom of note).    CXR Findings:  XR CHEST PORTABLE    Result Date: 3/16/2024  Impression: No acute cardiopulmonary abnormality. Electronically signed by Mich Koch MD      The findings from the last RT Protocol Assessment were as follows:  Smoking: Chronic pulmonary disease  Respiratory Pattern: Regular pattern and RR 12-20 bpm  Breath Sounds: Slightly diminished and/or crackles  Cough: Strong, spontaneous, non-productive  Indication for Bronchodilator Therapy: Decreased or absent breath sounds, On home bronchodilators  Bronchodilator Assessment Score: 4    Aerosolized bronchodilator medication orders have been revised according to the RT Nebulizer Bronchodilator Protocol below.    Respiratory Therapist to perform RT Therapy Protocol Assessment initially then follow the protocol.  Repeat RT Therapy Protocol Assessment PRN for score 0-3 or on second treatment, BID, and PRN for scores above 3.    No Indications - adjust the frequency to every 6 hours PRN wheezing or bronchospasm, if no treatments needed after 48 hours then discontinue using Per Protocol order mode.     If indication present, adjust the RT bronchodilator orders based on the Bronchodilator Assessment Score as indicated below.  If a patient is on this medication at home then do not decrease Frequency below that used at home.    0-3 - enter or revise RT bronchodilator order(s) to equivalent RT Bronchodilator order with Frequency of every 4 hours PRN for wheezing or increased work of breathing using Per Protocol order mode.       4-6 - enter or revise RT Bronchodilator order(s) to two equivalent RT bronchodilator orders with one order with BID Frequency and one order with Frequency of every 4 hours PRN wheezing

## 2024-03-20 VITALS
TEMPERATURE: 97.4 F | SYSTOLIC BLOOD PRESSURE: 136 MMHG | HEIGHT: 58 IN | HEART RATE: 63 BPM | BODY MASS INDEX: 18.64 KG/M2 | DIASTOLIC BLOOD PRESSURE: 69 MMHG | OXYGEN SATURATION: 95 % | WEIGHT: 88.8 LBS | RESPIRATION RATE: 16 BRPM

## 2024-03-20 LAB
ANION GAP SERPL CALCULATED.3IONS-SCNC: 9 MMOL/L (ref 7–16)
BASOPHILS ABSOLUTE: 0 K/CU MM
BASOPHILS RELATIVE PERCENT: 0.2 % (ref 0–1)
BUN SERPL-MCNC: 23 MG/DL (ref 6–23)
CALCIUM SERPL-MCNC: 8.7 MG/DL (ref 8.3–10.6)
CHLORIDE BLD-SCNC: 105 MMOL/L (ref 99–110)
CO2: 27 MMOL/L (ref 21–32)
CREAT SERPL-MCNC: 0.7 MG/DL (ref 0.6–1.1)
DIFFERENTIAL TYPE: ABNORMAL
EOSINOPHILS ABSOLUTE: 0.4 K/CU MM
EOSINOPHILS RELATIVE PERCENT: 3.7 % (ref 0–3)
GFR SERPL CREATININE-BSD FRML MDRD: >60 ML/MIN/1.73M2
GLUCOSE SERPL-MCNC: 95 MG/DL (ref 70–99)
HCT VFR BLD CALC: 39.4 % (ref 37–47)
HEMOGLOBIN: 13.3 GM/DL (ref 12.5–16)
IMMATURE NEUTROPHIL %: 0.4 % (ref 0–0.43)
LYMPHOCYTES ABSOLUTE: 1.9 K/CU MM
LYMPHOCYTES RELATIVE PERCENT: 16.1 % (ref 24–44)
MCH RBC QN AUTO: 32.2 PG (ref 27–31)
MCHC RBC AUTO-ENTMCNC: 33.8 % (ref 32–36)
MCV RBC AUTO: 95.4 FL (ref 78–100)
MONOCYTES ABSOLUTE: 0.8 K/CU MM
MONOCYTES RELATIVE PERCENT: 6.6 % (ref 0–4)
NUCLEATED RBC %: 4.7 %
PDW BLD-RTO: 11.9 % (ref 11.7–14.9)
PLATELET # BLD: 236 K/CU MM (ref 140–440)
PMV BLD AUTO: 10.6 FL (ref 7.5–11.1)
POTASSIUM SERPL-SCNC: 4 MMOL/L (ref 3.5–5.1)
RBC # BLD: 4.13 M/CU MM (ref 4.2–5.4)
SEGMENTED NEUTROPHILS ABSOLUTE COUNT: 8.4 K/CU MM
SEGMENTED NEUTROPHILS RELATIVE PERCENT: 73 % (ref 36–66)
SODIUM BLD-SCNC: 141 MMOL/L (ref 135–145)
TOTAL IMMATURE NEUTOROPHIL: 0.05 K/CU MM
TOTAL NUCLEATED RBC: 0.5 K/CU MM
WBC # BLD: 11.6 K/CU MM (ref 4–10.5)

## 2024-03-20 PROCEDURE — 94669 MECHANICAL CHEST WALL OSCILL: CPT

## 2024-03-20 PROCEDURE — 6370000000 HC RX 637 (ALT 250 FOR IP): Performed by: FAMILY MEDICINE

## 2024-03-20 PROCEDURE — 6370000000 HC RX 637 (ALT 250 FOR IP): Performed by: STUDENT IN AN ORGANIZED HEALTH CARE EDUCATION/TRAINING PROGRAM

## 2024-03-20 PROCEDURE — 36415 COLL VENOUS BLD VENIPUNCTURE: CPT

## 2024-03-20 PROCEDURE — 2580000003 HC RX 258: Performed by: STUDENT IN AN ORGANIZED HEALTH CARE EDUCATION/TRAINING PROGRAM

## 2024-03-20 PROCEDURE — 80048 BASIC METABOLIC PNL TOTAL CA: CPT

## 2024-03-20 PROCEDURE — 94640 AIRWAY INHALATION TREATMENT: CPT

## 2024-03-20 PROCEDURE — 6360000002 HC RX W HCPCS: Performed by: FAMILY MEDICINE

## 2024-03-20 PROCEDURE — G0378 HOSPITAL OBSERVATION PER HR: HCPCS

## 2024-03-20 PROCEDURE — 85025 COMPLETE CBC W/AUTO DIFF WBC: CPT

## 2024-03-20 PROCEDURE — 94761 N-INVAS EAR/PLS OXIMETRY MLT: CPT

## 2024-03-20 PROCEDURE — 6370000000 HC RX 637 (ALT 250 FOR IP): Performed by: NURSE PRACTITIONER

## 2024-03-20 RX ORDER — PREDNISONE 20 MG/1
40 TABLET ORAL DAILY
Qty: 10 TABLET | Refills: 0 | Status: ON HOLD | OUTPATIENT
Start: 2024-03-20 | End: 2024-03-25

## 2024-03-20 RX ORDER — AMLODIPINE BESYLATE 5 MG/1
5 TABLET ORAL DAILY
Qty: 30 TABLET | Refills: 0 | Status: ON HOLD | OUTPATIENT
Start: 2024-03-20

## 2024-03-20 RX ORDER — RANOLAZINE 500 MG/1
500 TABLET, EXTENDED RELEASE ORAL 2 TIMES DAILY
Qty: 60 TABLET | Refills: 3 | Status: ON HOLD | OUTPATIENT
Start: 2024-03-20

## 2024-03-20 RX ORDER — AZITHROMYCIN 500 MG/1
500 TABLET, FILM COATED ORAL DAILY
Qty: 3 TABLET | Refills: 0 | Status: SHIPPED | OUTPATIENT
Start: 2024-03-20 | End: 2024-03-23

## 2024-03-20 RX ADMIN — ASPIRIN 81 MG: 81 TABLET, CHEWABLE ORAL at 08:15

## 2024-03-20 RX ADMIN — PANTOPRAZOLE SODIUM 40 MG: 40 TABLET, DELAYED RELEASE ORAL at 05:58

## 2024-03-20 RX ADMIN — ACETAMINOPHEN 650 MG: 325 TABLET ORAL at 12:35

## 2024-03-20 RX ADMIN — TIOTROPIUM BROMIDE AND OLODATEROL 2 PUFF: 3.124; 2.736 SPRAY, METERED RESPIRATORY (INHALATION) at 07:05

## 2024-03-20 RX ADMIN — METOPROLOL TARTRATE 25 MG: 25 TABLET, FILM COATED ORAL at 08:15

## 2024-03-20 RX ADMIN — AMLODIPINE BESYLATE 5 MG: 5 TABLET ORAL at 08:15

## 2024-03-20 RX ADMIN — SODIUM CHLORIDE, PRESERVATIVE FREE 10 ML: 5 INJECTION INTRAVENOUS at 08:16

## 2024-03-20 RX ADMIN — ALBUTEROL SULFATE 2.5 MG: 2.5 SOLUTION RESPIRATORY (INHALATION) at 07:05

## 2024-03-20 RX ADMIN — FAMOTIDINE 20 MG: 20 TABLET ORAL at 08:15

## 2024-03-20 RX ADMIN — PREDNISONE 40 MG: 20 TABLET ORAL at 08:15

## 2024-03-20 RX ADMIN — CLOPIDOGREL BISULFATE 75 MG: 75 TABLET ORAL at 08:15

## 2024-03-20 ASSESSMENT — PAIN DESCRIPTION - ORIENTATION: ORIENTATION: RIGHT

## 2024-03-20 ASSESSMENT — PAIN SCALES - GENERAL
PAINLEVEL_OUTOF10: 4
PAINLEVEL_OUTOF10: 7

## 2024-03-20 ASSESSMENT — PAIN DESCRIPTION - LOCATION: LOCATION: EYE

## 2024-03-20 NOTE — CARE COORDINATION
CM consulted to assist with a ride home pt was transferred from [place] and  no one has a car that will make it to [place]. [employer]venient transportation called, [phone], voucher completed for pt ride to [address] [place] [address]. ENMA PUENTE/SYED

## 2024-03-20 NOTE — PROGRESS NOTES
Pharmacy Note - Renal dose adjustment made per P/T protocol    Original order:  Famotidine 20 mg BID    Estimated Creatinine Clearance: 39 mL/min (based on SCr of 0.8 mg/dL).    Recent Labs     03/16/24  0110 03/17/24  0122 03/17/24  0929   BUN 20 20 19   CREATININE 0.7 0.8 0.8       Renally adjusted order:  Famotidine 20 mg daily    Please call pharmacy with any questions.    Thank you,  Tejal Smith [facility]  3/18/2024 8:26 AM    
    V2.0  [facility] [place]      Name:  Kate Minor /Age/Sex: 1960  (63 y.o. female)   MRN & CSN:  [id number] & [id number] Encounter Date/Time: 3/19/2024 8:17 AM EDT    Location:  [facility]  PCP: Sharyn Delatorre, KJ Garcia CNP       Hospital Day: 4    Assessment and Plan:   Kate Minor is a 63 y.o. 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  Nitroglycerin was given x 1 with relief.  Repeat EKG completed normal sinus rhythm, QTc is elongated at 524.  Patient transferred here from [facility] for cardiology consultation, Continue telemetry.  Echocardiogram completed on 2023 showed an EF of 55 to 60%, RVSP 45 mmHg.  Cardiology consulted  Per Dr. Strauss, likely ischemia 2/2 COPD Exacerbation, no further cardiac workup needed     Known CAD, continue Plavix and aspirin statin and beta-blocker.  Patient was admitted in January and underwent left heart cath which showed CAD  ECHO: EF 75-80%, mild/mod aortic regurg, mild mitral/tricuspid/pulmonic regurg     Acute respiratory failure with hypoxia and hypercapnia secondary to COPD exacerbation.  Continue O2 at 2 L.  As needed nebulizers continue azithromycin, prednisone and Tessalon Perles.  Respiratory viral panel was completed and negative. Adding Acapella Vest to help break up mucous, having a hard time coughing it up. 3/18 adding Mucomyst 10% TID per Resp Therapy   Her breathing is improved, but still getting quite SOB with little activity and having difficulty coughing up her mucous, will cont Mucomyst and hope for early DC tomorrow 3/20     Hypertension, continue amlodipine 5 mg daily and Lopressor 25 mg daily  Hyperlipidemia, continue statin  Salas's esophagus, 
    V2.0  [facility] [place]      Name:  Kate Minor /Age/Sex: 1960  (63 y.o. female)   MRN & CSN:  [id number] & [id number] Encounter Date/Time: 3/18/2024 8:17 AM EDT    Location:  [facility]  PCP: Sharyn Delatorre, KJ Garcia CNP       Hospital Day: 3    Assessment and Plan:   Kate Minor is a 63 y.o. 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  Nitroglycerin was given x 1 with relief.  Repeat EKG completed normal sinus rhythm, QTc is elongated at 524.  Patient transferred here from [facility] for cardiology consultation, Continue telemetry.  Echocardiogram completed on 2023 showed an EF of 55 to 60%, RVSP 45 mmHg.  Cardiology consulted  Per Dr. Strauss, likely ischemia 2/2 COPD Exacerbation, no further cardiac workup needed   Known CAD, continue Plavix and aspirin statin and beta-blocker.  Patient was admitted in January and underwent left heart cath which showed CAD  Acute respiratory failure with hypoxia and hypercapnia secondary to COPD exacerbation.  Continue O2 at 2 L.  As needed nebulizers continue azithromycin, prednisone and Tessalon Perles.  Respiratory viral panel was completed and negative. Adding Acapella Vest to help break up mucous, having a hard time coughing it up. 3/18 adding Mucomyst 10% TID per Resp Therapy   Hypertension, continue amlodipine 5 mg daily and Lopressor 25 mg daily  Hyperlipidemia, continue statin  Salas's esophagus, continue PPI  Nicotine dependence, smoking cessation encouraged and discussed.  Nicotine patch was ordered.  Protein calorie malnutrition with a BMI of 15.63  Consult Dietary and adding supplements TID   Code status - d/w patient in detail. Wishes to be full code.  Epigastric Pain and 
4 Eyes Skin Assessment     NAME:  Kate Minor  YOB: 1960  MEDICAL RECORD NUMBER:  [id number]    The patient is being assessed for  Admission    I agree that at least one RN has performed a thorough Head to Toe Skin Assessment on the patient. ALL assessment sites listed below have been assessed.      Areas assessed by both nurses:    Head, Face, Ears, Shoulders, Back, Chest, Arms, Elbows, Hands, Sacrum. Buttock, Coccyx, Ischium, and Legs. Feet and Heels        Does the Patient have a Wound? No noted wound(s)       Navid Prevention initiated by RN: No  Wound Care Orders initiated by RN: No    Pressure Injury (Stage 3,4, Unstageable, DTI, NWPT, and Complex wounds) if present, place Wound referral order by RN under : No    New Ostomies, if present place, Ostomy referral order under : No     Nurse 1 eSignature: Electronically signed by Madelyn Hinkle RN on 3/16/24 at 5:56 PM EDT    **SHARE this note so that the co-signing nurse can place an eSignature**    Nurse 2 eSignature: Electronically signed by Eusebia Montgomery RN on 3/16/24 at 8:49 PM EDT   
Cardiology Progress Note    Subjective/Overnight Events:  Patient continues to have chest pain that is worse with coughing as well as inspiration.    Discuss care with her.  Informed her that the chest pain at this time it is noncardiac in nature and that we will consider conservative measures at this time    Assessment/Plan:  Atypical chest pain in setting of COPD exacerbation  Elevated troponin: Non-ACS trend  Nonobstructive coronary artery disease  -Chest pain musculoskeletal and pleuritic in nature.  -Most recent heart catheterization showing mild to moderate nonobstructive coronary artery disease.  -Repeat limited echocardiogram showing hyperdynamic state with mild to moderate VHD.  No wall motion abnormalities.  Advised increased hydration  -No further ischemic workup planned at this time.  -Continue aspirin, Plavix, Lipitor 40 mg daily and Lopressor 25 mg twice daily  COPD of exacerbation  Salas's esophagus  GERD  -Could be contributing to some chest discomfort  -On Protonix and Pepcid  -Per primary care  Tobacco abuse: Advised cessation,  Protein calorie malnutrition: BMI 15    Cardiology will sign off, please call with any questions.  Patient to follow-up in clinic.     Echo 3/18/2024    Left Ventricle: Hyperdynamic left ventricular systolic function with a visually estimated EF of 75 - 80%. Left ventricle size is normal. Normal wall thickness.    Aortic Valve: Mild to moderate regurgitation. AV PHT is 507.0 ms.    Mitral Valve: Mild regurgitation.    Tricuspid Valve: Mild regurgitation. The estimated RVSP is 44 mmHg.    Pericardium: No pericardial effusion.    [facility] 1/24/2024  Diffuse calcified vessel   40-50 % stenosis of long segment in ramus  40-50 % long lesion in mid and distal Co dominant Circ   LAD is diffusely calcified of 20-30 % lesion  RCA is tortious and calcified     Delvis Chang PA-C 03/18/24 2:37 PM        
Discharge instructions reviewed with patient. Pt verbalized understanding. IV removed with catheter intact. Pt left with all belongings. Pt escorted to lobby via wheelchair by tech. [employer].  
LOVENOX PROPHYLAXIS EVALUATION  (Populations not addressed in this protocol: trauma, obstetrics, or COVID-19)    Wt Readings from Last 3 Encounters:   03/16/24 33.9 kg (74 lb 12.8 oz)   02/07/24 33.6 kg (74 lb)   02/02/24 33.1 kg (73 lb)       Estimated Creatinine Clearance: 43 mL/min (based on SCr of 0.7 mg/dL).  Recent Labs     03/16/24  0110   BUN 20   CREATININE 0.7      HGB 15.1   HCT 45.9       Weight Range: 50.9 kg and below    CRCL = 30 or greater    50.9 kg   and below     .9  kg   101-150.9 kg   151-174.9  kg   175 kg  or greater     30mg subq  daily     40mg subq daily    (or 30mg subq BID orthopedic)     30mg subq  BID   40mg subq  BID   60mg subq BID       Per P/T protocol for appropriate subq anticoagulation by weight and CRCL change to:    Enoxparin 30mg subq daily      Nino Garduno RPH  6:06 PM  03/16/24       
Patient is now on Ra. SpO2 is 97% on RA while resting.  
Pharmacy was perfect served by this RT before 7am requesting mucomyst. Medication has yet to be delivered via tube system or rounds. Mucomyst held at this time due to medication not being available  
Duration 68 ms    Q-T Interval 480 ms    QTc Calculation (Bazett) 491 ms    P Axis 90 degrees    R Axis 81 degrees    T Axis 87 degrees    Diagnosis       Sinus rhythm with short IA  Possible Left atrial enlargement  Left ventricular hypertrophy  Cannot rule out Septal infarct , age undetermined  Abnormal ECG  When compared with ECG of 16-MAR-2024 08:10,  Nonspecific T wave abnormality now evident in Anterior leads     CBC    Collection Time: 03/17/24  1:22 AM   Result Value Ref Range    WBC 19.2 (H) 4.0 - 10.5 K/CU MM    RBC 3.99 (L) 4.2 - 5.4 M/CU MM    Hemoglobin 12.9 12.5 - 16.0 GM/DL    Hematocrit 40.5 37 - 47 %    .5 (H) 78 - 100 FL    MCH 32.3 (H) 27 - 31 PG    MCHC 31.9 (L) 32.0 - 36.0 %    RDW 12.4 11.7 - 14.9 %    Platelets 220 140 - 440 K/CU MM    MPV 10.4 7.5 - 11.1 FL   Basic Metabolic Panel w/ Reflex to MG    Collection Time: 03/17/24  1:22 AM   Result Value Ref Range    Sodium 138 135 - 145 MMOL/L    Potassium 4.4 3.5 - 5.1 MMOL/L    Chloride 104 99 - 110 mMol/L    CO2 26 21 - 32 MMOL/L    Anion Gap 8 7 - 16    Glucose 99 70 - 99 MG/DL    BUN 20 6 - 23 MG/DL    Creatinine 0.8 0.6 - 1.1 MG/DL    Est, Glom Filt Rate >60 >60 mL/min/1.73m2    Calcium 9.2 8.3 - 10.6 MG/DL   Comprehensive Metabolic Panel w/ Reflex to MG    Collection Time: 03/17/24  9:29 AM   Result Value Ref Range    Sodium 140 135 - 145 MMOL/L    Potassium 3.9 3.5 - 5.1 MMOL/L    Chloride 104 99 - 110 mMol/L    CO2 27 21 - 32 MMOL/L    Anion Gap 9 7 - 16    Glucose 128 (H) 70 - 99 MG/DL    BUN 19 6 - 23 MG/DL    Creatinine 0.8 0.6 - 1.1 MG/DL    Est, Glom Filt Rate >60 >60 mL/min/1.73m2    Calcium 8.9 8.3 - 10.6 MG/DL    Total Protein 5.1 (L) 6.4 - 8.2 GM/DL    Albumin 3.6 3.4 - 5.0 GM/DL    Total Bilirubin 0.2 0.0 - 1.0 MG/DL    Alkaline Phosphatase 83 40 - 128 IU/L    ALT 20 10 - 40 U/L    AST 17 15 - 37 IU/L        Imaging/Diagnostics Last 24 Hours   XR CHEST PORTABLE    Result Date: 3/16/2024  XR CHEST PORTABLE Indication:

## 2024-03-20 NOTE — DISCHARGE SUMMARY
Discharge Summary           Name:  Kate Minor /Age/Sex: 1960  (63 y.o. female)   MRN & CSN:  [id number] & [id number] Admission Date/Time: 3/16/2024  5:26 PM   Attending:  Pankaj Bowling MD Discharging Physician: Debra Land, APRN - [facility]     Hospital Course:       Kate Minor is a 63 y.o. female with PMH CAD, Salas's esophagus, HLD, COPD not on oxygen at home and CVA with residual left-sided weakness presented to [facility] on 3/16/2024 with complaints of chest pain.  She was found to be in acute COPD exacerbation was hospitalized for further workup/treatment.  Hospital course as noted below     Acute COPD exacerbation: was treated with steroids with improvement in symptoms.  Discharged home on azithromycin 500 mg daily, steroid burst.  Continue home inhalers.  She was strongly advised on smoking cessation.  Pulmonology follow-up requested prior to discharge     NSTEMI: presented with CP, SOB.  Troponin peaked at 0.87 and trended down.  EKG without acute ST changes.  She does have known history of CAD; 2024 [facility] with multivessel CAD.  Medical management was recommended at that time.  Evaluated by cardiology this hospitalization who suspected type II MI/NSTEMI secondary to acute COPD exacerbation.  She was discharged home on Plavix, beta-blocker, statin, advised to restart amlodipine and Ranexa was started.  Outpatient cardiology follow-up requested prior to discharge      Acute respiratory failure: with hypoxia and hypercapnia secondary to COPD exacerbation.  RIP negative.  CXR nonacute.  Secondary to acute COPD exacerbation.  Resolved at discharge.  Adequately oxygenating on room air.  Pulmonology follow-up requested    HTN: per hx. BP controlled. Cont home BB, amlodipine     Salas's esophagus: per hx. On PPI     Protein calorie malnutrition: BMI of 15.63.  Recommend dietary supplements.  Reported having outpatient lung cancer screening in process        The patient expressed appropriate

## 2024-03-20 NOTE — CARE COORDINATION
Patient currently on the Med Assist flagged list.  She received voucher assistance at discharge on 1/25/24 and was mailed an application.  To date we have not received anything back from her.  She will need to do a complete application and provide all required documents to determine eligibility for further assistance.

## 2024-03-24 ENCOUNTER — APPOINTMENT (OUTPATIENT)
Dept: GENERAL RADIOLOGY | Age: 64
End: 2024-03-24
Payer: MEDICAID

## 2024-03-24 ENCOUNTER — HOSPITAL ENCOUNTER (INPATIENT)
Age: 64
LOS: 12 days | Discharge: HOME | End: 2024-04-05
Attending: INTERNAL MEDICINE
Payer: MEDICAID

## 2024-03-24 ENCOUNTER — HOSPITAL ENCOUNTER (EMERGENCY)
Age: 64
Discharge: ANOTHER HOSPITAL | End: 2024-03-24
Attending: STUDENT IN AN ORGANIZED HEALTH CARE EDUCATION/TRAINING PROGRAM
Payer: MEDICAID

## 2024-03-24 VITALS
HEART RATE: 92 BPM | OXYGEN SATURATION: 97 % | TEMPERATURE: 100.6 F | DIASTOLIC BLOOD PRESSURE: 61 MMHG | RESPIRATION RATE: 22 BRPM | SYSTOLIC BLOOD PRESSURE: 106 MMHG

## 2024-03-24 DIAGNOSIS — J44.1 COPD EXACERBATION (HCC): Primary | ICD-10-CM

## 2024-03-24 DIAGNOSIS — R79.89 ELEVATED TROPONIN: ICD-10-CM

## 2024-03-24 DIAGNOSIS — R13.10 DYSPHAGIA, UNSPECIFIED TYPE: ICD-10-CM

## 2024-03-24 DIAGNOSIS — R50.9 FEVER, UNSPECIFIED FEVER CAUSE: ICD-10-CM

## 2024-03-24 PROBLEM — J96.01 ACUTE HYPOXEMIC RESPIRATORY FAILURE (HCC): Status: ACTIVE | Noted: 2024-03-24

## 2024-03-24 LAB
ALBUMIN SERPL-MCNC: 4.2 GM/DL (ref 3.4–5)
ALP BLD-CCNC: 98 IU/L (ref 40–129)
ALT SERPL-CCNC: 31 U/L (ref 10–40)
ANION GAP SERPL CALCULATED.3IONS-SCNC: 11 MMOL/L (ref 7–16)
AST SERPL-CCNC: 28 IU/L (ref 15–37)
B PARAP IS1001 DNA NPH QL NAA+NON-PROBE: NOT DETECTED
B PERT.PT PRMT NPH QL NAA+NON-PROBE: NOT DETECTED
BASE EXCESS MIXED: 0.5 (ref 0–3)
BASE EXCESS MIXED: 1.2 (ref 0–3)
BASE EXCESS MIXED: 1.6 (ref 0–3)
BASE EXCESS: ABNORMAL (ref 0–3)
BASE EXCESS: ABNORMAL (ref 0–3)
BASOPHILS ABSOLUTE: 0 K/CU MM
BASOPHILS RELATIVE PERCENT: 0.1 % (ref 0–1)
BILIRUB SERPL-MCNC: 0.4 MG/DL (ref 0–1)
BUN SERPL-MCNC: 16 MG/DL (ref 6–23)
C PNEUM DNA NPH QL NAA+NON-PROBE: NOT DETECTED
CALCIUM SERPL-MCNC: 8.2 MG/DL (ref 8.3–10.6)
CHLORIDE BLD-SCNC: 99 MMOL/L (ref 99–110)
CO2 CONTENT: 30.4 MMOL/L (ref 21–32)
CO2 CONTENT: 32.4 MMOL/L (ref 21–32)
CO2: 28 MMOL/L (ref 21–32)
COMMENT: ABNORMAL
CREAT SERPL-MCNC: 0.6 MG/DL (ref 0.6–1.1)
DIFFERENTIAL TYPE: ABNORMAL
EOSINOPHILS ABSOLUTE: 0 K/CU MM
EOSINOPHILS RELATIVE PERCENT: 0 % (ref 0–3)
FLUAV H1 2009 PAN RNA NPH NAA+NON-PROBE: ABNORMAL
FLUAV H1 RNA NPH QL NAA+NON-PROBE: NOT DETECTED
FLUAV H3 RNA NPH QL NAA+NON-PROBE: NOT DETECTED
FLUAV RNA NPH QL NAA+NON-PROBE: NOT DETECTED
FLUBV RNA NPH QL NAA+NON-PROBE: NOT DETECTED
GFR SERPL CREATININE-BSD FRML MDRD: >60 ML/MIN/1.73M2
GLUCOSE SERPL-MCNC: 129 MG/DL (ref 70–99)
HADV DNA NPH QL NAA+NON-PROBE: NOT DETECTED
HCO3 VENOUS: 28.8 MMOL/L (ref 22–29)
HCO3 VENOUS: 29 MMOL/L (ref 22–29)
HCO3 VENOUS: 30 MMOL/L (ref 22–29)
HCOV 229E RNA NPH QL NAA+NON-PROBE: NOT DETECTED
HCOV HKU1 RNA NPH QL NAA+NON-PROBE: NOT DETECTED
HCOV NL63 RNA NPH QL NAA+NON-PROBE: NOT DETECTED
HCOV OC43 RNA NPH QL NAA+NON-PROBE: NOT DETECTED
HCT VFR BLD CALC: 42.6 % (ref 37–47)
HEMOGLOBIN: 14.5 GM/DL (ref 12.5–16)
HMPV RNA NPH QL NAA+NON-PROBE: NOT DETECTED
HPIV1 RNA NPH QL NAA+NON-PROBE: NOT DETECTED
HPIV2 RNA NPH QL NAA+NON-PROBE: NOT DETECTED
HPIV3 RNA NPH QL NAA+NON-PROBE: NOT DETECTED
HPIV4 RNA NPH QL NAA+NON-PROBE: NOT DETECTED
IMMATURE NEUTROPHIL %: 0.9 % (ref 0–0.43)
LACTIC ACID, SEPSIS: 1.4 MMOL/L (ref 0.4–2)
LYMPHOCYTES ABSOLUTE: 1.6 K/CU MM
LYMPHOCYTES RELATIVE PERCENT: 7 % (ref 24–44)
M PNEUMO DNA NPH QL NAA+NON-PROBE: NOT DETECTED
MAGNESIUM: 2.1 MG/DL (ref 1.8–2.4)
MCH RBC QN AUTO: 33.1 PG (ref 27–31)
MCHC RBC AUTO-ENTMCNC: 34 % (ref 32–36)
MCV RBC AUTO: 97.3 FL (ref 78–100)
MONOCYTES ABSOLUTE: 1.4 K/CU MM
MONOCYTES RELATIVE PERCENT: 6.1 % (ref 0–4)
O2 SAT, VEN: 92.4 % (ref 50–70)
O2 SAT, VEN: 97.2 % (ref 50–70)
O2 SAT, VEN: 99 % (ref 50–70)
PCO2, VEN: 55 MMHG (ref 41–51)
PCO2, VEN: 59 MMHG (ref 41–51)
PCO2, VEN: 76.1 MMHG (ref 41–51)
PDW BLD-RTO: 12.4 % (ref 11.7–14.9)
PH VENOUS: 7.2 (ref 7.32–7.43)
PH VENOUS: 7.3 (ref 7.32–7.43)
PH VENOUS: 7.33 (ref 7.32–7.43)
PLATELET # BLD: 343 K/CU MM (ref 140–440)
PMV BLD AUTO: 10.1 FL (ref 7.5–11.1)
PO2, VEN: 116.7 MMHG (ref 28–48)
PO2, VEN: 143.3 MMHG (ref 28–48)
PO2, VEN: 81 MMHG (ref 28–48)
POTASSIUM SERPL-SCNC: 4.7 MMOL/L (ref 3.5–5.1)
PRO-BNP: 401.7 PG/ML
RBC # BLD: 4.38 M/CU MM (ref 4.2–5.4)
RSV RNA NPH QL NAA+NON-PROBE: NOT DETECTED
RV+EV RNA NPH QL NAA+NON-PROBE: NOT DETECTED
SARS-COV-2 RNA NPH QL NAA+NON-PROBE: NOT DETECTED
SEGMENTED NEUTROPHILS ABSOLUTE COUNT: 18.9 K/CU MM
SEGMENTED NEUTROPHILS RELATIVE PERCENT: 85.9 % (ref 36–66)
SODIUM BLD-SCNC: 138 MMOL/L (ref 135–145)
SOURCE, BLOOD GAS: ABNORMAL
SOURCE, BLOOD GAS: ABNORMAL
TOTAL IMMATURE NEUTOROPHIL: 0.19 K/CU MM
TOTAL PROTEIN: 7 GM/DL (ref 6.4–8.2)
TROPONIN, HIGH SENSITIVITY: 21 NG/L (ref 0–14)
TROPONIN, HIGH SENSITIVITY: 33 NG/L (ref 0–14)
WBC # BLD: 22.1 K/CU MM (ref 4–10.5)

## 2024-03-24 PROCEDURE — 83880 ASSAY OF NATRIURETIC PEPTIDE: CPT

## 2024-03-24 PROCEDURE — 94660 CPAP INITIATION&MGMT: CPT

## 2024-03-24 PROCEDURE — 2060000000 HC ICU INTERMEDIATE R&B

## 2024-03-24 PROCEDURE — 36415 COLL VENOUS BLD VENIPUNCTURE: CPT

## 2024-03-24 PROCEDURE — 6370000000 HC RX 637 (ALT 250 FOR IP): Performed by: INTERNAL MEDICINE

## 2024-03-24 PROCEDURE — 99285 EMERGENCY DEPT VISIT HI MDM: CPT

## 2024-03-24 PROCEDURE — 94640 AIRWAY INHALATION TREATMENT: CPT

## 2024-03-24 PROCEDURE — 6370000000 HC RX 637 (ALT 250 FOR IP): Performed by: FAMILY MEDICINE

## 2024-03-24 PROCEDURE — 6370000000 HC RX 637 (ALT 250 FOR IP): Performed by: STUDENT IN AN ORGANIZED HEALTH CARE EDUCATION/TRAINING PROGRAM

## 2024-03-24 PROCEDURE — 83735 ASSAY OF MAGNESIUM: CPT

## 2024-03-24 PROCEDURE — 71045 X-RAY EXAM CHEST 1 VIEW: CPT

## 2024-03-24 PROCEDURE — 80053 COMPREHEN METABOLIC PANEL: CPT

## 2024-03-24 PROCEDURE — 96367 TX/PROPH/DG ADDL SEQ IV INF: CPT

## 2024-03-24 PROCEDURE — 2580000003 HC RX 258: Performed by: STUDENT IN AN ORGANIZED HEALTH CARE EDUCATION/TRAINING PROGRAM

## 2024-03-24 PROCEDURE — 0202U NFCT DS 22 TRGT SARS-COV-2: CPT

## 2024-03-24 PROCEDURE — 87040 BLOOD CULTURE FOR BACTERIA: CPT

## 2024-03-24 PROCEDURE — 2700000000 HC OXYGEN THERAPY PER DAY

## 2024-03-24 PROCEDURE — 93005 ELECTROCARDIOGRAM TRACING: CPT | Performed by: STUDENT IN AN ORGANIZED HEALTH CARE EDUCATION/TRAINING PROGRAM

## 2024-03-24 PROCEDURE — 96366 THER/PROPH/DIAG IV INF ADDON: CPT

## 2024-03-24 PROCEDURE — 85025 COMPLETE CBC W/AUTO DIFF WBC: CPT

## 2024-03-24 PROCEDURE — 84484 ASSAY OF TROPONIN QUANT: CPT

## 2024-03-24 PROCEDURE — 6360000002 HC RX W HCPCS: Performed by: INTERNAL MEDICINE

## 2024-03-24 PROCEDURE — 5A09457 ASSISTANCE WITH RESPIRATORY VENTILATION, 24-96 CONSECUTIVE HOURS, CONTINUOUS POSITIVE AIRWAY PRESSURE: ICD-10-PCS | Performed by: INTERNAL MEDICINE

## 2024-03-24 PROCEDURE — 6360000002 HC RX W HCPCS: Performed by: STUDENT IN AN ORGANIZED HEALTH CARE EDUCATION/TRAINING PROGRAM

## 2024-03-24 PROCEDURE — 82805 BLOOD GASES W/O2 SATURATION: CPT

## 2024-03-24 PROCEDURE — 96365 THER/PROPH/DIAG IV INF INIT: CPT

## 2024-03-24 PROCEDURE — 83605 ASSAY OF LACTIC ACID: CPT

## 2024-03-24 PROCEDURE — 94761 N-INVAS EAR/PLS OXIMETRY MLT: CPT

## 2024-03-24 PROCEDURE — 2580000003 HC RX 258: Performed by: INTERNAL MEDICINE

## 2024-03-24 RX ORDER — POLYETHYLENE GLYCOL 3350 17 G/17G
17 POWDER, FOR SOLUTION ORAL DAILY PRN
Status: DISCONTINUED | OUTPATIENT
Start: 2024-03-24 | End: 2024-03-27

## 2024-03-24 RX ORDER — SODIUM CHLORIDE 0.9 % (FLUSH) 0.9 %
5-40 SYRINGE (ML) INJECTION EVERY 12 HOURS SCHEDULED
Status: DISCONTINUED | OUTPATIENT
Start: 2024-03-24 | End: 2024-04-05 | Stop reason: HOSPADM

## 2024-03-24 RX ORDER — SODIUM CHLORIDE 0.9 % (FLUSH) 0.9 %
5-40 SYRINGE (ML) INJECTION PRN
Status: DISCONTINUED | OUTPATIENT
Start: 2024-03-24 | End: 2024-04-05 | Stop reason: HOSPADM

## 2024-03-24 RX ORDER — IPRATROPIUM BROMIDE AND ALBUTEROL SULFATE 2.5; .5 MG/3ML; MG/3ML
2 SOLUTION RESPIRATORY (INHALATION) ONCE
Status: COMPLETED | OUTPATIENT
Start: 2024-03-24 | End: 2024-03-24

## 2024-03-24 RX ORDER — OSELTAMIVIR PHOSPHATE 30 MG/1
30 CAPSULE ORAL 2 TIMES DAILY
Status: DISPENSED | OUTPATIENT
Start: 2024-03-24 | End: 2024-03-29

## 2024-03-24 RX ORDER — SODIUM CHLORIDE, SODIUM LACTATE, POTASSIUM CHLORIDE, AND CALCIUM CHLORIDE .6; .31; .03; .02 G/100ML; G/100ML; G/100ML; G/100ML
1000 INJECTION, SOLUTION INTRAVENOUS ONCE
Status: COMPLETED | OUTPATIENT
Start: 2024-03-24 | End: 2024-03-24

## 2024-03-24 RX ORDER — OSELTAMIVIR PHOSPHATE 75 MG/1
75 CAPSULE ORAL 2 TIMES DAILY
Status: DISCONTINUED | OUTPATIENT
Start: 2024-03-24 | End: 2024-03-24

## 2024-03-24 RX ORDER — GABAPENTIN 300 MG/1
600 CAPSULE ORAL NIGHTLY
Status: DISCONTINUED | OUTPATIENT
Start: 2024-03-24 | End: 2024-03-25

## 2024-03-24 RX ORDER — SODIUM CHLORIDE 9 MG/ML
INJECTION, SOLUTION INTRAVENOUS PRN
Status: DISCONTINUED | OUTPATIENT
Start: 2024-03-24 | End: 2024-04-05 | Stop reason: HOSPADM

## 2024-03-24 RX ORDER — IPRATROPIUM BROMIDE AND ALBUTEROL SULFATE 2.5; .5 MG/3ML; MG/3ML
1 SOLUTION RESPIRATORY (INHALATION)
Status: COMPLETED | OUTPATIENT
Start: 2024-03-24 | End: 2024-03-24

## 2024-03-24 RX ORDER — VANCOMYCIN 1.5 G/300ML
1500 INJECTION, SOLUTION INTRAVENOUS ONCE
Status: COMPLETED | OUTPATIENT
Start: 2024-03-24 | End: 2024-03-24

## 2024-03-24 RX ORDER — ASPIRIN 81 MG/1
324 TABLET, CHEWABLE ORAL ONCE
Status: COMPLETED | OUTPATIENT
Start: 2024-03-24 | End: 2024-03-24

## 2024-03-24 RX ORDER — BUSPIRONE HYDROCHLORIDE 5 MG/1
5 TABLET ORAL 2 TIMES DAILY
Status: DISCONTINUED | OUTPATIENT
Start: 2024-03-24 | End: 2024-04-05 | Stop reason: HOSPADM

## 2024-03-24 RX ORDER — ACETAMINOPHEN 650 MG/1
650 SUPPOSITORY RECTAL EVERY 6 HOURS PRN
Status: DISCONTINUED | OUTPATIENT
Start: 2024-03-24 | End: 2024-04-05 | Stop reason: HOSPADM

## 2024-03-24 RX ORDER — ONDANSETRON 4 MG/1
4 TABLET, ORALLY DISINTEGRATING ORAL EVERY 8 HOURS PRN
Status: DISCONTINUED | OUTPATIENT
Start: 2024-03-24 | End: 2024-04-05 | Stop reason: HOSPADM

## 2024-03-24 RX ORDER — ACETAMINOPHEN 325 MG/1
650 TABLET ORAL EVERY 6 HOURS PRN
Status: DISCONTINUED | OUTPATIENT
Start: 2024-03-24 | End: 2024-04-05 | Stop reason: HOSPADM

## 2024-03-24 RX ORDER — ACETAMINOPHEN 500 MG
1000 TABLET ORAL
Status: COMPLETED | OUTPATIENT
Start: 2024-03-24 | End: 2024-03-24

## 2024-03-24 RX ORDER — ALBUTEROL SULFATE 90 UG/1
2 AEROSOL, METERED RESPIRATORY (INHALATION) EVERY 6 HOURS PRN
Status: DISCONTINUED | OUTPATIENT
Start: 2024-03-24 | End: 2024-04-05 | Stop reason: HOSPADM

## 2024-03-24 RX ORDER — GABAPENTIN 300 MG/1
600 CAPSULE ORAL DAILY
Status: DISCONTINUED | OUTPATIENT
Start: 2024-03-24 | End: 2024-03-24

## 2024-03-24 RX ORDER — ENOXAPARIN SODIUM 100 MG/ML
30 INJECTION SUBCUTANEOUS DAILY
Status: DISCONTINUED | OUTPATIENT
Start: 2024-03-24 | End: 2024-04-05 | Stop reason: HOSPADM

## 2024-03-24 RX ORDER — BUSPIRONE HYDROCHLORIDE 10 MG/1
5 TABLET ORAL 2 TIMES DAILY
COMMUNITY
Start: 2024-02-15

## 2024-03-24 RX ORDER — CLOPIDOGREL BISULFATE 75 MG/1
75 TABLET ORAL DAILY
Status: DISCONTINUED | OUTPATIENT
Start: 2024-03-24 | End: 2024-04-05 | Stop reason: HOSPADM

## 2024-03-24 RX ORDER — PREDNISONE 20 MG/1
40 TABLET ORAL DAILY
Status: COMPLETED | OUTPATIENT
Start: 2024-03-26 | End: 2024-03-28

## 2024-03-24 RX ORDER — ENOXAPARIN SODIUM 100 MG/ML
40 INJECTION SUBCUTANEOUS DAILY
Status: DISCONTINUED | OUTPATIENT
Start: 2024-03-24 | End: 2024-03-24 | Stop reason: DRUGHIGH

## 2024-03-24 RX ORDER — ROSUVASTATIN CALCIUM 20 MG/1
20 TABLET, COATED ORAL DAILY
Status: DISCONTINUED | OUTPATIENT
Start: 2024-03-24 | End: 2024-04-05 | Stop reason: HOSPADM

## 2024-03-24 RX ORDER — BENZONATATE 100 MG/1
100 CAPSULE ORAL 3 TIMES DAILY PRN
Status: DISCONTINUED | OUTPATIENT
Start: 2024-03-24 | End: 2024-04-05 | Stop reason: HOSPADM

## 2024-03-24 RX ORDER — IPRATROPIUM BROMIDE AND ALBUTEROL SULFATE 2.5; .5 MG/3ML; MG/3ML
1 SOLUTION RESPIRATORY (INHALATION)
Status: DISCONTINUED | OUTPATIENT
Start: 2024-03-24 | End: 2024-03-30

## 2024-03-24 RX ORDER — SODIUM CHLORIDE 9 MG/ML
INJECTION, SOLUTION INTRAVENOUS CONTINUOUS
Status: DISPENSED | OUTPATIENT
Start: 2024-03-24 | End: 2024-03-24

## 2024-03-24 RX ORDER — PANTOPRAZOLE SODIUM 40 MG/1
40 TABLET, DELAYED RELEASE ORAL
Status: DISCONTINUED | OUTPATIENT
Start: 2024-03-25 | End: 2024-04-05 | Stop reason: HOSPADM

## 2024-03-24 RX ORDER — 0.9 % SODIUM CHLORIDE 0.9 %
500 INTRAVENOUS SOLUTION INTRAVENOUS ONCE
Status: COMPLETED | OUTPATIENT
Start: 2024-03-24 | End: 2024-03-24

## 2024-03-24 RX ORDER — ONDANSETRON 2 MG/ML
4 INJECTION INTRAMUSCULAR; INTRAVENOUS EVERY 6 HOURS PRN
Status: DISCONTINUED | OUTPATIENT
Start: 2024-03-24 | End: 2024-04-05 | Stop reason: HOSPADM

## 2024-03-24 RX ORDER — RANOLAZINE 500 MG/1
500 TABLET, EXTENDED RELEASE ORAL 2 TIMES DAILY
Status: DISCONTINUED | OUTPATIENT
Start: 2024-03-24 | End: 2024-04-05 | Stop reason: HOSPADM

## 2024-03-24 RX ORDER — AMLODIPINE BESYLATE 5 MG/1
5 TABLET ORAL DAILY
Status: DISCONTINUED | OUTPATIENT
Start: 2024-03-25 | End: 2024-03-29

## 2024-03-24 RX ADMIN — IPRATROPIUM BROMIDE AND ALBUTEROL SULFATE 1 DOSE: 2.5; .5 SOLUTION RESPIRATORY (INHALATION) at 04:43

## 2024-03-24 RX ADMIN — WATER 40 MG: 1 INJECTION INTRAMUSCULAR; INTRAVENOUS; SUBCUTANEOUS at 18:41

## 2024-03-24 RX ADMIN — SODIUM CHLORIDE, PRESERVATIVE FREE 10 ML: 5 INJECTION INTRAVENOUS at 20:13

## 2024-03-24 RX ADMIN — IPRATROPIUM BROMIDE AND ALBUTEROL SULFATE 1 DOSE: .5; 2.5 SOLUTION RESPIRATORY (INHALATION) at 15:07

## 2024-03-24 RX ADMIN — Medication 1 LOZENGE: at 14:02

## 2024-03-24 RX ADMIN — WATER 40 MG: 1 INJECTION INTRAMUSCULAR; INTRAVENOUS; SUBCUTANEOUS at 11:37

## 2024-03-24 RX ADMIN — SODIUM CHLORIDE, POTASSIUM CHLORIDE, SODIUM LACTATE AND CALCIUM CHLORIDE 1000 ML: 600; 310; 30; 20 INJECTION, SOLUTION INTRAVENOUS at 04:55

## 2024-03-24 RX ADMIN — AZITHROMYCIN MONOHYDRATE 500 MG: 500 INJECTION, POWDER, LYOPHILIZED, FOR SOLUTION INTRAVENOUS at 11:37

## 2024-03-24 RX ADMIN — BENZONATATE 100 MG: 100 CAPSULE ORAL at 20:13

## 2024-03-24 RX ADMIN — SODIUM CHLORIDE: 9 INJECTION, SOLUTION INTRAVENOUS at 19:57

## 2024-03-24 RX ADMIN — WATER 40 MG: 1 INJECTION INTRAMUSCULAR; INTRAVENOUS; SUBCUTANEOUS at 23:30

## 2024-03-24 RX ADMIN — SODIUM CHLORIDE: 9 INJECTION, SOLUTION INTRAVENOUS at 13:35

## 2024-03-24 RX ADMIN — GABAPENTIN 600 MG: 300 CAPSULE ORAL at 23:30

## 2024-03-24 RX ADMIN — ACETAMINOPHEN 650 MG: 325 TABLET ORAL at 14:01

## 2024-03-24 RX ADMIN — SODIUM CHLORIDE 500 ML: 9 INJECTION, SOLUTION INTRAVENOUS at 10:58

## 2024-03-24 RX ADMIN — ACETAMINOPHEN 1000 MG: 500 TABLET ORAL at 06:33

## 2024-03-24 RX ADMIN — RANOLAZINE 500 MG: 500 TABLET, EXTENDED RELEASE ORAL at 20:14

## 2024-03-24 RX ADMIN — CEFEPIME 2000 MG: 2 INJECTION, POWDER, FOR SOLUTION INTRAVENOUS at 05:16

## 2024-03-24 RX ADMIN — ACETAMINOPHEN 650 MG: 325 TABLET ORAL at 21:27

## 2024-03-24 RX ADMIN — OSELTAMIVIR PHOSPHATE 75 MG: 75 CAPSULE ORAL at 14:01

## 2024-03-24 RX ADMIN — VANCOMYCIN 1500 MG: 1.5 INJECTION, SOLUTION INTRAVENOUS at 05:55

## 2024-03-24 RX ADMIN — ASPIRIN 81 MG CHEWABLE TABLET 324 MG: 81 TABLET CHEWABLE at 06:33

## 2024-03-24 RX ADMIN — IPRATROPIUM BROMIDE AND ALBUTEROL SULFATE 1 DOSE: .5; 2.5 SOLUTION RESPIRATORY (INHALATION) at 20:55

## 2024-03-24 RX ADMIN — BUSPIRONE HYDROCHLORIDE 5 MG: 5 TABLET ORAL at 20:14

## 2024-03-24 RX ADMIN — BUSPIRONE HYDROCHLORIDE 5 MG: 5 TABLET ORAL at 14:27

## 2024-03-24 RX ADMIN — OSELTAMIVIR PHOSPHATE 30 MG: 75 CAPSULE ORAL at 20:13

## 2024-03-24 RX ADMIN — ENOXAPARIN SODIUM 30 MG: 100 INJECTION SUBCUTANEOUS at 14:01

## 2024-03-24 RX ADMIN — IPRATROPIUM BROMIDE AND ALBUTEROL SULFATE 2 DOSE: 2.5; .5 SOLUTION RESPIRATORY (INHALATION) at 04:44

## 2024-03-24 RX ADMIN — IPRATROPIUM BROMIDE AND ALBUTEROL SULFATE 1 DOSE: .5; 2.5 SOLUTION RESPIRATORY (INHALATION) at 11:46

## 2024-03-24 ASSESSMENT — PAIN DESCRIPTION - LOCATION: LOCATION: HEAD

## 2024-03-24 ASSESSMENT — PAIN DESCRIPTION - PAIN TYPE: TYPE: ACUTE PAIN

## 2024-03-24 ASSESSMENT — PAIN DESCRIPTION - ONSET: ONSET: ON-GOING

## 2024-03-24 ASSESSMENT — PAIN DESCRIPTION - FREQUENCY: FREQUENCY: INTERMITTENT

## 2024-03-24 ASSESSMENT — PAIN SCALES - GENERAL
PAINLEVEL_OUTOF10: 8
PAINLEVEL_OUTOF10: 0
PAINLEVEL_OUTOF10: 0

## 2024-03-24 ASSESSMENT — PAIN - FUNCTIONAL ASSESSMENT: PAIN_FUNCTIONAL_ASSESSMENT: ACTIVITIES ARE NOT PREVENTED

## 2024-03-24 ASSESSMENT — PAIN DESCRIPTION - DESCRIPTORS: DESCRIPTORS: ACHING

## 2024-03-24 NOTE — H&P
V2.0  History and Physical      Name:  Kate Minor /Age/Sex: 1960  (63 y.o. female)   MRN & CSN:  [id number] & [id number] Encounter Date/Time: 3/24/2024 10:35 AM EDT   Location:  -A PCP: Sharyn Delatorre APRN - CNP       Hospital Day: 1    Assessment and Plan:   Kate Minor is a 63 y.o. female who presents with Acute hypoxemic respiratory failure (HCC)    Hospital Problems             Last Modified POA    * (Principal) Acute hypoxemic respiratory failure (HCC) 3/24/2024 Yes    COPD exacerbation ([facility]) 3/24/2024 Yes       Plan:  Acute Hypoxemic Respiratory Failure with Hypercapnia: Secondary to Flu/COPD exacerbation, repeat VBG with normal pH; repeat VBG one time and if appropriate will d/c Bipap and transition to NC  Influenza Positive: Started on Tamiflu and cough suppressants, throat lonzenge when able to transition off of Bipap  COPD Exacerbation: Continue scheduled bronchodilators and steroid therapy, continuous pulse ox  Hypotension: Patient is asymptomatic and does not appear toxic; will give a bolus and continue IV hydration; holding anti-hypertensives  Sinus Tachycardia: Multifactorial secondary to above; HR 90's at bedside  Leukocytosis: Currently on steroid burst at home likely contributing; no evidence of bacterial infection and will repeat in AM; also appears CBC is slightly concentrated   History of Anxiety: Supposed to be taking Buspar at home but never picked up script; will order  Hx CAD: Continue Plavix and Statin  Hx GERD/Salas's Esophagus:  Continue PPI    I had a code status discussion with the patient and the patient would like to be a full code.    Updated by bedside nurse of new VBG results.  Patient did have a short break from Bipap and VBG again mildly acidotic off of bipap; will give her a little break from Bipap per patient request and then put back on continous.  D/w bedside nurse.    Disposition:   Current Living situation: Home  Expected Disposition:  Q24H    sodium chloride  500 mL IntraVENous Once      Infusions:    sodium chloride      sodium chloride       PRN Meds: sodium chloride flush, 5-40 mL, PRN  sodium chloride, , PRN  ondansetron, 4 mg, Q8H PRN   Or  ondansetron, 4 mg, Q6H PRN  polyethylene glycol, 17 g, Daily PRN  acetaminophen, 650 mg, Q6H PRN   Or  acetaminophen, 650 mg, Q6H PRN  benzonatate, 100 mg, TID PRN  albuterol sulfate HFA, 2 puff, Q6H PRN  Benzocaine-Menthol, 1 lozenge, Q2H PRN        Labs      CBC:   Recent Labs     03/24/24 0430   WBC 22.1*   HGB 14.5        BMP:    Recent Labs     03/24/24 0430      K 4.7   CL 99   CO2 28   BUN 16   CREATININE 0.6   GLUCOSE 129*     Hepatic:   Recent Labs     03/24/24 0430   AST 28   ALT 31   BILITOT 0.4   ALKPHOS 98     Lipids:   Lab Results   Component Value Date/Time    CHOL 192 01/24/2024 03:12 AM    CHOL 165 03/21/2023 12:00 AM    HDL 83 01/24/2024 03:12 AM    TRIG 39 01/24/2024 03:12 AM     Hemoglobin A1C:   Lab Results   Component Value Date/Time    LABA1C 5.5 02/05/2024 05:25 AM     TSH:   Lab Results   Component Value Date/Time    TSH 2.50 07/28/2016 02:26 PM     Troponin:   Lab Results   Component Value Date/Time    TROPONINT <0.010 04/17/2020 11:00 AM     Lactic Acid: No results for input(s): \"LACTA\" in the last 72 hours.  BNP:   Recent Labs     03/24/24 0430   PROBNP 401.7*     UA:  Lab Results   Component Value Date/Time    NITRU NEGATIVE 03/16/2024 04:15 AM    COLORU YELLOW 03/16/2024 04:15 AM    PHUR 5.5 02/22/2017 03:37 PM    WBCUA 0 TO 1 03/18/2016 03:00 PM    RBCUA NEGATIVE 03/18/2016 03:00 PM    MUCUS 1+ 09/30/2013 05:45 PM    BACTERIA LARGE 03/18/2016 03:00 PM    CLARITYU CLEAR 03/16/2024 04:15 AM    SPECGRAV 1.020 03/16/2024 04:15 AM    LEUKOCYTESUR NEGATIVE 03/16/2024 04:15 AM    UROBILINOGEN 0.2 03/16/2024 04:15 AM    BILIRUBINUR NEGATIVE 03/16/2024 04:15 AM    BILIRUBINUR Negative 02/22/2017 03:37 PM    BLOODU NEGATIVE 03/16/2024 04:15 AM    GLUCOSEU Negative

## 2024-03-24 NOTE — ED PROVIDER NOTES
Needs (3/16/2024)    PRAPARE - Transportation     Lack of Transportation (Medical): No     Lack of Transportation (Non-Medical): No   Recent Concern: Transportation Needs - Unmet Transportation Needs (1/23/2024)    PRAPARE - Transportation     Lack of Transportation (Medical): Yes     Lack of Transportation (Non-Medical): No   Physical Activity: Not on file   Stress: Not on file   Social Connections: Not on file   Intimate Partner Violence: Not on file   Housing Stability: Low Risk  (3/16/2024)    Housing Stability Vital Sign     Unable to Pay for Housing in the Last Year: No     Number of Places Lived in the Last Year: 1     Unstable Housing in the Last Year: No     Current Facility-Administered Medications   Medication Dose Route Frequency Provider Last Rate Last Admin    aspirin chewable tablet 324 mg  324 mg Oral Once Srinivasan Sainz MD        lactated ringers bolus 1,000 mL  1,000 mL IntraVENous Once Srinivasan Sainz .6 mL/hr at 03/24/24 0455 1,000 mL at 03/24/24 0455    acetaminophen (TYLENOL) tablet 1,000 mg  1,000 mg Oral NOW Srinivasan Sainz MD        vancomycin (VANCOCIN) 1500 mg in 300 mL IVPB  1,500 mg IntraVENous Once Srinivasan Sainz MD         Current Outpatient Medications   Medication Sig Dispense Refill    predniSONE (DELTASONE) 20 MG tablet Take 2 tablets by mouth daily for 5 days 10 tablet 0    amLODIPine (NORVASC) 5 MG tablet Take 1 tablet by mouth daily 30 tablet 0    ranolazine (RANEXA) 500 MG extended release tablet Take 1 tablet by mouth 2 times daily 60 tablet 3    metoprolol tartrate (LOPRESSOR) 25 MG tablet Take 1 tablet by mouth 2 times daily 60 tablet 3    nicotine (NICODERM CQ) 7 MG/24HR Place 1 patch onto the skin daily for 14 days 14 patch 0    clopidogrel (PLAVIX) 75 MG tablet Take 1 tablet by mouth daily 30 tablet 3    tiotropium-olodaterol (STIOLTO RESPIMAT) 2.5-2.5 MCG/ACT AERS Inhale 2 puffs into the lungs daily 1 each 3    albuterol

## 2024-03-24 NOTE — PROGRESS NOTES
RENAL DOSE ADJUSTMENT MADE PER P/T PROTOCOL    PREVIOUS ORDER:  Tamiflu 75 mg BID    Estimated Creatinine Clearance: 50 mL/min (based on SCr of 0.6 mg/dL).  Recent Labs     03/24/24  0430   BUN 16   CREATININE 0.6        NEW RENALLY ADJUSTED ORDER:  Tamflu 30 mg BID    Guadalupe Smith RPH  3/24/2024 2:22 PM

## 2024-03-24 NOTE — PLAN OF CARE
Problem: Discharge Planning  Goal: Discharge to home or other facility with appropriate resources  Outcome: Progressing     Problem: Respiratory - Adult  Goal: Achieves optimal ventilation and oxygenation  Outcome: Progressing     Problem: Infection - Adult  Goal: Absence of infection at discharge  Outcome: Progressing  Goal: Absence of infection during hospitalization  Outcome: Progressing  Goal: Absence of fever/infection during anticipated neutropenic period  Outcome: Progressing     Problem: Anxiety  Goal: Will report anxiety at manageable levels  Description: INTERVENTIONS:  1. Administer medication as ordered  2. Teach and rehearse alternative coping skills  3. Provide emotional support with 1:1 interaction with staff  Outcome: Progressing     Problem: Coping  Goal: Pt/Family able to verbalize concerns and demonstrate effective coping strategies  Description: INTERVENTIONS:  1. Assist patient/family to identify coping skills, available support systems and cultural and spiritual values  2. Provide emotional support, including active listening and acknowledgement of concerns of patient and caregivers  3. Reduce environmental stimuli, as able  4. Instruct patient/family in relaxation techniques, as appropriate  5. Assess for spiritual pain/suffering and initiate Spiritual Care, Psychosocial Clinical Specialist consults as needed  Outcome: Progressing

## 2024-03-24 NOTE — ED NOTES
Patient placed on bipap mask on the following settings: IPAP 16, EPAP 6, set RR 13 and Fi02 35%.  VS , RR 27 and Sp02 100%.  RT will titrate settings as patient tolerates.  ERMD aware

## 2024-03-24 NOTE — PROGRESS NOTES
Received patient from transport. Patient was on 16/6, 35% when brought in on transport BiPAP. Patient is now on 16/6, 30%. Patient is tolerating well.      03/24/24 0940   NIV Type   $NIV $Daily Charge   NIV Started/Stopped On   Equipment Type Trilogy   Mode Bilevel   Mask Type Full face mask   Mask Size Medium   Bonnet size Medium   Settings/Measurements   PIP Observed 16.1 cm H20   IPAP 16 cmH20   CPAP/EPAP 6 cmH2O   Vt (Measured) 546 mL   Rate Ordered 12   Insp Rise Time (%) 1 %   FiO2  35 %   Minute Volume (L/min) 12.1 Liters   Mask Leak (lpm) 65 lpm   Patient's Home Machine No   Alarm Settings   Alarms On Y   Low Pressure (cmH2O) 3 cmH2O   High Pressure (cmH2O) 20 cmH2O   Delay Alarm 20 sec(s)   Apnea (secs) 20 secs   RR Low (bpm) 12   RR High (bpm) 40 br/min

## 2024-03-25 LAB
ANION GAP SERPL CALCULATED.3IONS-SCNC: 11 MMOL/L (ref 7–16)
BASOPHILS ABSOLUTE: 0 K/CU MM
BASOPHILS RELATIVE PERCENT: 0.1 % (ref 0–1)
BUN SERPL-MCNC: 18 MG/DL (ref 6–23)
CALCIUM SERPL-MCNC: 9.3 MG/DL (ref 8.3–10.6)
CHLORIDE BLD-SCNC: 101 MMOL/L (ref 99–110)
CO2: 27 MMOL/L (ref 21–32)
CREAT SERPL-MCNC: 0.6 MG/DL (ref 0.6–1.1)
DIFFERENTIAL TYPE: ABNORMAL
EKG ATRIAL RATE: 144 BPM
EKG DIAGNOSIS: NORMAL
EKG P AXIS: 86 DEGREES
EKG P-R INTERVAL: 126 MS
EKG Q-T INTERVAL: 288 MS
EKG QRS DURATION: 64 MS
EKG QTC CALCULATION (BAZETT): 445 MS
EKG R AXIS: 72 DEGREES
EKG T AXIS: 93 DEGREES
EKG VENTRICULAR RATE: 144 BPM
EOSINOPHILS ABSOLUTE: 0 K/CU MM
EOSINOPHILS RELATIVE PERCENT: 0 % (ref 0–3)
GFR SERPL CREATININE-BSD FRML MDRD: >60 ML/MIN/1.73M2
GLUCOSE SERPL-MCNC: 103 MG/DL (ref 70–99)
HCT VFR BLD CALC: 39.5 % (ref 37–47)
HEMOGLOBIN: 12.6 GM/DL (ref 12.5–16)
IMMATURE NEUTROPHIL %: 0.4 % (ref 0–0.43)
LYMPHOCYTES ABSOLUTE: 0.4 K/CU MM
LYMPHOCYTES RELATIVE PERCENT: 2.2 % (ref 24–44)
MCH RBC QN AUTO: 32.1 PG (ref 27–31)
MCHC RBC AUTO-ENTMCNC: 31.9 % (ref 32–36)
MCV RBC AUTO: 100.8 FL (ref 78–100)
MONOCYTES ABSOLUTE: 0.8 K/CU MM
MONOCYTES RELATIVE PERCENT: 5.2 % (ref 0–4)
NUCLEATED RBC %: 0 %
PDW BLD-RTO: 13 % (ref 11.7–14.9)
PLATELET # BLD: 251 K/CU MM (ref 140–440)
PMV BLD AUTO: 10 FL (ref 7.5–11.1)
POTASSIUM SERPL-SCNC: 5.1 MMOL/L (ref 3.5–5.1)
PROCALCITONIN SERPL-MCNC: 2.69 NG/ML
RBC # BLD: 3.92 M/CU MM (ref 4.2–5.4)
SEGMENTED NEUTROPHILS ABSOLUTE COUNT: 14.8 K/CU MM
SEGMENTED NEUTROPHILS RELATIVE PERCENT: 92.1 % (ref 36–66)
SODIUM BLD-SCNC: 139 MMOL/L (ref 135–145)
TOTAL IMMATURE NEUTOROPHIL: 0.07 K/CU MM
TOTAL NUCLEATED RBC: 0 K/CU MM
WBC # BLD: 16.1 K/CU MM (ref 4–10.5)

## 2024-03-25 PROCEDURE — 80048 BASIC METABOLIC PNL TOTAL CA: CPT

## 2024-03-25 PROCEDURE — 93010 ELECTROCARDIOGRAM REPORT: CPT | Performed by: INTERNAL MEDICINE

## 2024-03-25 PROCEDURE — 94640 AIRWAY INHALATION TREATMENT: CPT

## 2024-03-25 PROCEDURE — 6360000002 HC RX W HCPCS: Performed by: INTERNAL MEDICINE

## 2024-03-25 PROCEDURE — 6370000000 HC RX 637 (ALT 250 FOR IP): Performed by: INTERNAL MEDICINE

## 2024-03-25 PROCEDURE — 99222 1ST HOSP IP/OBS MODERATE 55: CPT | Performed by: INTERNAL MEDICINE

## 2024-03-25 PROCEDURE — 2580000003 HC RX 258: Performed by: STUDENT IN AN ORGANIZED HEALTH CARE EDUCATION/TRAINING PROGRAM

## 2024-03-25 PROCEDURE — 85025 COMPLETE CBC W/AUTO DIFF WBC: CPT

## 2024-03-25 PROCEDURE — 94660 CPAP INITIATION&MGMT: CPT

## 2024-03-25 PROCEDURE — 36415 COLL VENOUS BLD VENIPUNCTURE: CPT

## 2024-03-25 PROCEDURE — 2700000000 HC OXYGEN THERAPY PER DAY

## 2024-03-25 PROCEDURE — 84145 PROCALCITONIN (PCT): CPT

## 2024-03-25 PROCEDURE — 6370000000 HC RX 637 (ALT 250 FOR IP): Performed by: STUDENT IN AN ORGANIZED HEALTH CARE EDUCATION/TRAINING PROGRAM

## 2024-03-25 PROCEDURE — 2060000000 HC ICU INTERMEDIATE R&B

## 2024-03-25 PROCEDURE — 2580000003 HC RX 258: Performed by: INTERNAL MEDICINE

## 2024-03-25 PROCEDURE — 94761 N-INVAS EAR/PLS OXIMETRY MLT: CPT

## 2024-03-25 PROCEDURE — 6360000002 HC RX W HCPCS: Performed by: STUDENT IN AN ORGANIZED HEALTH CARE EDUCATION/TRAINING PROGRAM

## 2024-03-25 RX ORDER — GABAPENTIN 300 MG/1
600 CAPSULE ORAL NIGHTLY
Status: DISCONTINUED | OUTPATIENT
Start: 2024-03-26 | End: 2024-04-05 | Stop reason: HOSPADM

## 2024-03-25 RX ORDER — GABAPENTIN 300 MG/1
600 CAPSULE ORAL DAILY
Status: DISCONTINUED | OUTPATIENT
Start: 2024-03-25 | End: 2024-03-25

## 2024-03-25 RX ADMIN — OSELTAMIVIR PHOSPHATE 30 MG: 75 CAPSULE ORAL at 11:25

## 2024-03-25 RX ADMIN — IPRATROPIUM BROMIDE AND ALBUTEROL SULFATE 1 DOSE: .5; 2.5 SOLUTION RESPIRATORY (INHALATION) at 21:03

## 2024-03-25 RX ADMIN — BUSPIRONE HYDROCHLORIDE 5 MG: 5 TABLET ORAL at 11:25

## 2024-03-25 RX ADMIN — SODIUM CHLORIDE, PRESERVATIVE FREE 10 ML: 5 INJECTION INTRAVENOUS at 21:44

## 2024-03-25 RX ADMIN — WATER 40 MG: 1 INJECTION INTRAMUSCULAR; INTRAVENOUS; SUBCUTANEOUS at 21:43

## 2024-03-25 RX ADMIN — ACETAMINOPHEN 650 MG: 325 TABLET ORAL at 21:42

## 2024-03-25 RX ADMIN — ACETAMINOPHEN 650 MG: 325 TABLET ORAL at 12:23

## 2024-03-25 RX ADMIN — IPRATROPIUM BROMIDE AND ALBUTEROL SULFATE 1 DOSE: .5; 2.5 SOLUTION RESPIRATORY (INHALATION) at 11:17

## 2024-03-25 RX ADMIN — WATER 40 MG: 1 INJECTION INTRAMUSCULAR; INTRAVENOUS; SUBCUTANEOUS at 11:26

## 2024-03-25 RX ADMIN — ROSUVASTATIN CALCIUM 20 MG: 20 TABLET, COATED ORAL at 11:24

## 2024-03-25 RX ADMIN — RANOLAZINE 500 MG: 500 TABLET, EXTENDED RELEASE ORAL at 11:25

## 2024-03-25 RX ADMIN — OSELTAMIVIR PHOSPHATE 30 MG: 75 CAPSULE ORAL at 21:42

## 2024-03-25 RX ADMIN — BUSPIRONE HYDROCHLORIDE 5 MG: 5 TABLET ORAL at 21:41

## 2024-03-25 RX ADMIN — PANTOPRAZOLE SODIUM 40 MG: 40 TABLET, DELAYED RELEASE ORAL at 05:51

## 2024-03-25 RX ADMIN — WATER 40 MG: 1 INJECTION INTRAMUSCULAR; INTRAVENOUS; SUBCUTANEOUS at 17:54

## 2024-03-25 RX ADMIN — CEFTRIAXONE 1000 MG: 1 INJECTION, POWDER, FOR SOLUTION INTRAMUSCULAR; INTRAVENOUS at 11:43

## 2024-03-25 RX ADMIN — GABAPENTIN 600 MG: 300 CAPSULE ORAL at 13:17

## 2024-03-25 RX ADMIN — CLOPIDOGREL BISULFATE 75 MG: 75 TABLET ORAL at 11:24

## 2024-03-25 RX ADMIN — WATER 40 MG: 1 INJECTION INTRAMUSCULAR; INTRAVENOUS; SUBCUTANEOUS at 05:51

## 2024-03-25 RX ADMIN — IPRATROPIUM BROMIDE AND ALBUTEROL SULFATE 1 DOSE: .5; 2.5 SOLUTION RESPIRATORY (INHALATION) at 16:17

## 2024-03-25 RX ADMIN — SODIUM CHLORIDE, PRESERVATIVE FREE 10 ML: 5 INJECTION INTRAVENOUS at 11:27

## 2024-03-25 RX ADMIN — RANOLAZINE 500 MG: 500 TABLET, EXTENDED RELEASE ORAL at 21:41

## 2024-03-25 RX ADMIN — AZITHROMYCIN MONOHYDRATE 500 MG: 500 INJECTION, POWDER, LYOPHILIZED, FOR SOLUTION INTRAVENOUS at 11:44

## 2024-03-25 RX ADMIN — ENOXAPARIN SODIUM 30 MG: 100 INJECTION SUBCUTANEOUS at 11:25

## 2024-03-25 ASSESSMENT — PAIN DESCRIPTION - LOCATION
LOCATION: HEAD
LOCATION: ARM

## 2024-03-25 ASSESSMENT — PAIN DESCRIPTION - ORIENTATION
ORIENTATION: LEFT

## 2024-03-25 ASSESSMENT — PAIN DESCRIPTION - ONSET
ONSET: GRADUAL

## 2024-03-25 ASSESSMENT — PAIN SCALES - WONG BAKER

## 2024-03-25 ASSESSMENT — PAIN SCALES - GENERAL
PAINLEVEL_OUTOF10: 8
PAINLEVEL_OUTOF10: 0
PAINLEVEL_OUTOF10: 8
PAINLEVEL_OUTOF10: 0
PAINLEVEL_OUTOF10: 0
PAINLEVEL_OUTOF10: 9
PAINLEVEL_OUTOF10: 0
PAINLEVEL_OUTOF10: 5

## 2024-03-25 ASSESSMENT — PAIN - FUNCTIONAL ASSESSMENT
PAIN_FUNCTIONAL_ASSESSMENT: ACTIVITIES ARE NOT PREVENTED

## 2024-03-25 ASSESSMENT — PAIN DESCRIPTION - DESCRIPTORS
DESCRIPTORS: BURNING

## 2024-03-25 ASSESSMENT — PAIN DESCRIPTION - PAIN TYPE
TYPE: ACUTE PAIN

## 2024-03-25 ASSESSMENT — PAIN DESCRIPTION - FREQUENCY
FREQUENCY: CONTINUOUS

## 2024-03-25 NOTE — PROGRESS NOTES
Comprehensive Nutrition Assessment    Type and Reason for Visit:  Initial (Low BMI)    Nutrition Recommendations/Plan:   Advance diet as medically able within the next 48 hours as able, pt likely meets criteria for severe malnutrition, will follow up for NFPE tomorrow   Recommend to Start Standard High Calorie/High Protein oral nutrition supplement TID     Malnutrition Assessment:  Malnutrition Status:  At risk for malnutrition (Comment) (hx severe malnutrition 2/4/24) (03/25/24 1205)    Context:  Chronic Illness     Findings of the 6 clinical characteristics of malnutrition:  Energy Intake:  Unable to assess  Weight Loss:  No significant weight loss     Body Fat Loss:   (severe body fat loss last month by previous RD) Triceps, Fat Overlying Ribs   Muscle Mass Loss:   (severe muscle loss last month by previous RD) Clavicles (pectoralis & deltoids), Hand (interosseous)  Fluid Accumulation:  No significant fluid accumulation     Strength:  Not Performed    Nutrition Assessment:    Admitted with acute hypoxemic respiratory failure, COPD excerbation. Pt has been hospitalized multiple times since beginning of the year with similar dx (1/23, 2/3, 3/16). PMH: NSTEMI, HTN, HLD, COPD, TIA, Chronic Low weight, Pulmonary emphysema, Hong's esophagus, GERD. Pt met criteria last month for severe malnutrition per RD's NFPE. Pt undergoing bipap treatment and requested RD to visit later due to increased O2 levels, denied NFPE at this time. Pt has been chronically undweight, stable weights x 1 year, appears thin frame. Recommend to restart Standard high calorie high protein oral nutrition supplements TID once diet advances. Follow as high nutrition risk.    Nutrition Related Findings:    Smoker. Glu 103, WBC 16.1, deltasone, solu-medrol. On 4L/min. Wound Type: None       Current Nutrition Intake & Therapies:    Average Meal Intake: NPO  Average Supplements Intake: None Ordered  Diet NPO    Anthropometric Measures:  Height:

## 2024-03-25 NOTE — PROGRESS NOTES
V2.0    [facility] Progress Note      Name:  Kate Minor /Age/Sex: 1960  (63 y.o. female)   MRN & CSN:  [id number] & [id number] Encounter Date/Time: 3/25/2024 7:52 AM EDT   Location:  -A PCP: Sharyn Delatorre APRN - CNP     Attending:Brigitte Forman MD       Hospital Day: 2    Assessment and Recommendations   Kate Minor is a 63 y.o. female with pmh of COPD, hypertension, Salas's esophagus, protein calorie malnutrition  who presents with Acute hypoxemic respiratory failure (HCC)      # Sepsis and acute  Respiratory Failure with hypoxia and hypercapnia Secondary to Flu/COPD exacerbation: Presented with shortness of breath and sore throat, SIRS 4/4 with tachycardia, tachypnea, temperature, elevated WBC lactic acid normal, VBG showed pCO2 55-59, requiring BiPAP, influenza positive, started on ceftriaxone, azithromycin, oseltamivir, obtain blood cultures, obtain procalcitonin, wean off O2 as tolerated.,  Continue inhalers, continue steroids, patient    # Influenza Positive: Started on Tamiflu and cough suppressants, throat lonzenge.    # COPD Exacerbation: Continue scheduled bronchodilators and steroid therapy, continuous pulse ox    # History of Anxiety: Supposed to be taking Buspar at home but never picked up script; will order    # Hx CAD: Continue Plavix and Statin    # Hx GERD/Salas's Esophagus:  Continue PPI    # Hypertension: On amlodipine 5 mg daily and Lopressor 25 mg daily, hold due to hypotension    # Hyperlipidemia, continue statin    # Severe malnutrition suspected protein calorie: BMI 15.53    # Active tobacco use: Continue encourage for cessation    Comment: Please note this report has been produced using speech recognition software and may contain errors related to that system including errors in grammar, punctuation, and spelling, as well as words and phrases that may be inappropriate. If there are any questions or concerns please feel free to contact the dictating  found for: \"ORG\"      Electronically signed by Brigitte Forman MD on 3/25/2024 at 7:52 AM

## 2024-03-25 NOTE — ACP (ADVANCE CARE PLANNING)
Patient expressed to RN that she wants to be DNR CCA. Evaluated the patient at bedside in the presence of Tin RN, she expressed understanding of code status and goals of care, states that she would want to be DNR CCA- no chest compressions, no intubation.

## 2024-03-25 NOTE — PROGRESS NOTES
Dr Gamino at bedside.  Talking with patient re code status.  Patient reports she does not want chest compressions, shock, and/or mech. Vent.

## 2024-03-25 NOTE — PLAN OF CARE
Problem: Discharge Planning  Goal: Discharge to home or other facility with appropriate resources  Outcome: Progressing     Problem: Respiratory - Adult  Goal: Achieves optimal ventilation and oxygenation  Outcome: Progressing     Problem: Infection - Adult  Goal: Absence of infection at discharge  Outcome: Progressing  Goal: Absence of infection during hospitalization  Outcome: Progressing  Goal: Absence of fever/infection during anticipated neutropenic period  Outcome: Progressing     Problem: Anxiety  Goal: Will report anxiety at manageable levels  Description: INTERVENTIONS:  1. Administer medication as ordered  2. Teach and rehearse alternative coping skills  3. Provide emotional support with 1:1 interaction with staff  Outcome: Progressing     Problem: Coping  Goal: Pt/Family able to verbalize concerns and demonstrate effective coping strategies  Description: INTERVENTIONS:  1. Assist patient/family to identify coping skills, available support systems and cultural and spiritual values  2. Provide emotional support, including active listening and acknowledgement of concerns of patient and caregivers  3. Reduce environmental stimuli, as able  4. Instruct patient/family in relaxation techniques, as appropriate  5. Assess for spiritual pain/suffering and initiate Spiritual Care, Psychosocial Clinical Specialist consults as needed  Outcome: Progressing     Problem: Safety - Adult  Goal: Free from fall injury  Outcome: Progressing     Problem: ABCDS Injury Assessment  Goal: Absence of physical injury  Outcome: Progressing

## 2024-03-25 NOTE — PROGRESS NOTES
Patient reports she does not want [facility]. Vent.  Perfect serve message sent to on-call provider A. Naegele, APRN.

## 2024-03-25 NOTE — PROGRESS NOTES
4 Eyes Skin Assessment     NAME:  Kate Minor  YOB: 1960  MEDICAL RECORD NUMBER:  [id number]    The patient is being assessed for  Admission    I agree that at least one RN has performed a thorough Head to Toe Skin Assessment on the patient. ALL assessment sites listed below have been assessed.      Areas assessed by both nurses:    Head, Face, Ears, Shoulders, Back, Chest, Arms, Elbows, Hands, Sacrum. Buttock, Coccyx, Ischium, Legs. Feet and Heels, Under Medical Devices , and Other          Does the Patient have a Wound? No noted wound(s)       Navid Prevention initiated by RN: Yes  Wound Care Orders initiated by RN: No    Pressure Injury (Stage 3,4, Unstageable, DTI, NWPT, and Complex wounds) if present, place Wound referral order by RN under : No    New Ostomies, if present place, Ostomy referral order under : No     Nurse 1 eSignature: Electronically signed by Carly Walsh RN on 3/25/24 at 8:34 AM EDT    **SHARE this note so that the co-signing nurse can place an eSignature**    Nurse 2 eSignature: Electronically signed by Silvia Alba RN on 3/25/24 at 9:21 AM EDT

## 2024-03-25 NOTE — CONSULTS
Pulmonary Consult Note      Reason for Consult:     acute respiratory failure      Requesting Physician:   Brigitte Forman MD    Subjective:   CHIEF COMPLAINT :SOB    Patient Active Problem List    Diagnosis Date Noted    Acute hypoxemic respiratory failure ([facility]) 03/24/2024    NSTEMI (non-ST elevated myocardial infarction) ([facility]) 03/16/2024    COPD exacerbation ([facility]) 02/05/2024    Severe malnutrition ([facility]) 01/24/2024     Class: Chronic    Chronic obstructive pulmonary disease with acute exacerbation ([facility]) 01/23/2024    Coronary artery disease involving native coronary artery of native heart with angina pectoris ([facility]) 01/23/2024     Overview Note:     Coronary anatomy:   The left main coronary artery has no significant disease.      Left anterior descending artery has no significant disease.     Ramus has 60-70% proximal segment stenosis     Circumflex artery has no significant disease 60-70% distal segment stenosis     The right coronary artery is a non-dominant vessel with no significant disease     Left ventriculogram shows ejection fraction of 55%. Normal wall motion.      Chest pain 07/28/2016    Tobacco use 07/21/2015    Hypertension 05/06/2015    Barretts esophagus 05/06/2015    Emphysema/COPD ([facility]) 05/06/2015        HPI:                The patient is a 63 y.o. female with significant past medical history of COPD, hypertension, Salas's esophagus, protein calorie malnutrition   presents with complaints of Cough,SOB and sore throat x 1 day. She has a 50 pk yr smoking. She has no recent travel, no suck exposure, no headaches, no n/v, no abd pain, no diarrhea,no dysuria, no chest pain, no fever. She was seen at [place] where she was found to have Hypoxia and Hypercapnea. At this time she is sitting in the bed. She is in mild resp distress. She is on Tamiflu, Abx, Inhalers and steroids.     Past Medical History:      Diagnosis Date    Salas's esophagus     Elevated troponin 01/23/2024    Emphysema of lung  polyphagia  MUSCULOSKELETAL:  negative for  pain, joint swelling, decreased range of motion and muscle weakness  NEUROLOGICAL:  negative for headaches, slurred speech, unilateral weakness  PSYCHIATRIC/BEHAVIORAL: negative for hallucinations, behavioral problems, confusion and agitation.     Objective:   PHYSICAL EXAM:      VITALS:  BP (!) 152/64   Pulse 93   Temp 97.6 °F (36.4 °C) (Axillary)   Resp 21   Ht 1.473 m (4' 10\")   Wt 33.7 kg (74 lb 4.7 oz)   SpO2 96%   BMI 15.53 kg/m²   24HR INTAKE/OUTPUT:    Intake/Output Summary (Last 24 hours) at 3/25/2024 1251  Last data filed at 3/25/2024 1130  Gross per 24 hour   Intake 1609.58 ml   Output --   Net 1609.58 ml     CURRENT PULSE OXIMETRY:  SpO2: 96 %  24HR PULSE OXIMETRY RANGE:  SpO2  Av.2 %  Min: 92 %  Max: 100 %    CONSTITUTIONAL:  awake, alert, cooperative, no apparent distress, and appears stated age  NECK:  Supple, symmetrical, trachea midline, no adenopathy, thyroid symmetric, not enlarged and no tenderness, skin normal  LUNGS:  Occasional basal crackles  CARDIOVASCULAR:  normal S1 and S2, no edema and no JVD  ABDOMEN:  normal bowel sounds, non-distended and no masses palpated, and no tenderness to palpation. No hepatospleenomegaly  LYMPHADENOPATHY:  no axillary or supraclavicular adenopathy. No cervical adnenopathy  PSYCHIATRIC: Oriented to person place and time. No obvious depression or anxiety.  MUSCULOSKELETAL: No obvious misalignment or effusion of the joints. No clubbing, cyanosis of the digits.  SKIN:  normal skin color, texture, turgor and no redness, warmth, or swelling. No palpable nodules    DATA:    Old records have been reviewed  CBC with Differential:    Lab Results   Component Value Date/Time    WBC 16.1 2024 04:18 AM    RBC 3.92 2024 04:18 AM    HGB 12.6 2024 04:18 AM    HCT 39.5 2024 04:18 AM     2024 04:18 AM    .8 2024 04:18 AM    MCH 32.1 2024 04:18 AM    MCHC 31.9 2024

## 2024-03-26 LAB
BASOPHILS ABSOLUTE: 0 K/CU MM
BASOPHILS RELATIVE PERCENT: 0.1 % (ref 0–1)
DIFFERENTIAL TYPE: ABNORMAL
EOSINOPHILS ABSOLUTE: 0 K/CU MM
EOSINOPHILS RELATIVE PERCENT: 0 % (ref 0–3)
HCT VFR BLD CALC: 40.4 % (ref 37–47)
HEMOGLOBIN: 12.9 GM/DL (ref 12.5–16)
IMMATURE NEUTROPHIL %: 0.5 % (ref 0–0.43)
LYMPHOCYTES ABSOLUTE: 0.5 K/CU MM
LYMPHOCYTES RELATIVE PERCENT: 4.1 % (ref 24–44)
MCH RBC QN AUTO: 32.2 PG (ref 27–31)
MCHC RBC AUTO-ENTMCNC: 31.9 % (ref 32–36)
MCV RBC AUTO: 100.7 FL (ref 78–100)
MONOCYTES ABSOLUTE: 0.5 K/CU MM
MONOCYTES RELATIVE PERCENT: 3.7 % (ref 0–4)
NUCLEATED RBC %: 0 %
PDW BLD-RTO: 12.8 % (ref 11.7–14.9)
PLATELET # BLD: 245 K/CU MM (ref 140–440)
PMV BLD AUTO: 10.1 FL (ref 7.5–11.1)
PROCALCITONIN SERPL-MCNC: 1.93 NG/ML
RBC # BLD: 4.01 M/CU MM (ref 4.2–5.4)
SEGMENTED NEUTROPHILS ABSOLUTE COUNT: 11.7 K/CU MM
SEGMENTED NEUTROPHILS RELATIVE PERCENT: 91.6 % (ref 36–66)
TOTAL IMMATURE NEUTOROPHIL: 0.06 K/CU MM
TOTAL NUCLEATED RBC: 0 K/CU MM
WBC # BLD: 12.8 K/CU MM (ref 4–10.5)

## 2024-03-26 PROCEDURE — 94640 AIRWAY INHALATION TREATMENT: CPT

## 2024-03-26 PROCEDURE — 94761 N-INVAS EAR/PLS OXIMETRY MLT: CPT

## 2024-03-26 PROCEDURE — 2700000000 HC OXYGEN THERAPY PER DAY

## 2024-03-26 PROCEDURE — 94660 CPAP INITIATION&MGMT: CPT

## 2024-03-26 PROCEDURE — 1200000000 HC SEMI PRIVATE

## 2024-03-26 PROCEDURE — 85025 COMPLETE CBC W/AUTO DIFF WBC: CPT

## 2024-03-26 PROCEDURE — 2580000003 HC RX 258: Performed by: INTERNAL MEDICINE

## 2024-03-26 PROCEDURE — 6370000000 HC RX 637 (ALT 250 FOR IP): Performed by: INTERNAL MEDICINE

## 2024-03-26 PROCEDURE — 6360000002 HC RX W HCPCS: Performed by: INTERNAL MEDICINE

## 2024-03-26 PROCEDURE — 84145 PROCALCITONIN (PCT): CPT

## 2024-03-26 PROCEDURE — 6360000002 HC RX W HCPCS: Performed by: STUDENT IN AN ORGANIZED HEALTH CARE EDUCATION/TRAINING PROGRAM

## 2024-03-26 PROCEDURE — 99232 SBSQ HOSP IP/OBS MODERATE 35: CPT | Performed by: INTERNAL MEDICINE

## 2024-03-26 PROCEDURE — 6370000000 HC RX 637 (ALT 250 FOR IP): Performed by: STUDENT IN AN ORGANIZED HEALTH CARE EDUCATION/TRAINING PROGRAM

## 2024-03-26 PROCEDURE — 36415 COLL VENOUS BLD VENIPUNCTURE: CPT

## 2024-03-26 PROCEDURE — 2580000003 HC RX 258: Performed by: STUDENT IN AN ORGANIZED HEALTH CARE EDUCATION/TRAINING PROGRAM

## 2024-03-26 PROCEDURE — 80048 BASIC METABOLIC PNL TOTAL CA: CPT

## 2024-03-26 RX ORDER — GABAPENTIN 300 MG/1
600 CAPSULE ORAL ONCE
Status: DISCONTINUED | OUTPATIENT
Start: 2024-03-26 | End: 2024-04-05 | Stop reason: HOSPADM

## 2024-03-26 RX ORDER — NICOTINE 21 MG/24HR
1 PATCH, TRANSDERMAL 24 HOURS TRANSDERMAL DAILY
Status: DISCONTINUED | OUTPATIENT
Start: 2024-03-26 | End: 2024-04-05 | Stop reason: HOSPADM

## 2024-03-26 RX ORDER — LIDOCAINE 4 G/G
1 PATCH TOPICAL DAILY
Status: DISCONTINUED | OUTPATIENT
Start: 2024-03-26 | End: 2024-04-05 | Stop reason: HOSPADM

## 2024-03-26 RX ORDER — DEXTROSE AND SODIUM CHLORIDE 5; .9 G/100ML; G/100ML
INJECTION, SOLUTION INTRAVENOUS CONTINUOUS
Status: DISCONTINUED | OUTPATIENT
Start: 2024-03-26 | End: 2024-03-27

## 2024-03-26 RX ADMIN — OSELTAMIVIR PHOSPHATE 30 MG: 75 CAPSULE ORAL at 10:30

## 2024-03-26 RX ADMIN — PREDNISONE 40 MG: 10 TABLET ORAL at 13:15

## 2024-03-26 RX ADMIN — ROSUVASTATIN CALCIUM 20 MG: 20 TABLET, COATED ORAL at 10:30

## 2024-03-26 RX ADMIN — GABAPENTIN 600 MG: 300 CAPSULE ORAL at 22:50

## 2024-03-26 RX ADMIN — BUSPIRONE HYDROCHLORIDE 5 MG: 5 TABLET ORAL at 10:30

## 2024-03-26 RX ADMIN — IPRATROPIUM BROMIDE AND ALBUTEROL SULFATE 1 DOSE: .5; 2.5 SOLUTION RESPIRATORY (INHALATION) at 21:05

## 2024-03-26 RX ADMIN — Medication 1 LOZENGE: at 07:03

## 2024-03-26 RX ADMIN — DEXTROSE AND SODIUM CHLORIDE: 5; 900 INJECTION, SOLUTION INTRAVENOUS at 15:02

## 2024-03-26 RX ADMIN — PANTOPRAZOLE SODIUM 40 MG: 40 TABLET, DELAYED RELEASE ORAL at 07:00

## 2024-03-26 RX ADMIN — BUSPIRONE HYDROCHLORIDE 5 MG: 5 TABLET ORAL at 20:32

## 2024-03-26 RX ADMIN — TIOTROPIUM BROMIDE AND OLODATEROL 2 PUFF: 3.124; 2.736 SPRAY, METERED RESPIRATORY (INHALATION) at 07:37

## 2024-03-26 RX ADMIN — ENOXAPARIN SODIUM 30 MG: 100 INJECTION SUBCUTANEOUS at 10:30

## 2024-03-26 RX ADMIN — WATER 40 MG: 1 INJECTION INTRAMUSCULAR; INTRAVENOUS; SUBCUTANEOUS at 07:00

## 2024-03-26 RX ADMIN — AZITHROMYCIN MONOHYDRATE 500 MG: 500 INJECTION, POWDER, LYOPHILIZED, FOR SOLUTION INTRAVENOUS at 13:14

## 2024-03-26 RX ADMIN — OSELTAMIVIR PHOSPHATE 30 MG: 75 CAPSULE ORAL at 20:33

## 2024-03-26 RX ADMIN — BENZONATATE 100 MG: 100 CAPSULE ORAL at 20:32

## 2024-03-26 RX ADMIN — IPRATROPIUM BROMIDE AND ALBUTEROL SULFATE 1 DOSE: .5; 2.5 SOLUTION RESPIRATORY (INHALATION) at 12:46

## 2024-03-26 RX ADMIN — IPRATROPIUM BROMIDE AND ALBUTEROL SULFATE 1 DOSE: .5; 2.5 SOLUTION RESPIRATORY (INHALATION) at 07:36

## 2024-03-26 RX ADMIN — CLOPIDOGREL BISULFATE 75 MG: 75 TABLET ORAL at 10:30

## 2024-03-26 RX ADMIN — RANOLAZINE 500 MG: 500 TABLET, EXTENDED RELEASE ORAL at 20:32

## 2024-03-26 RX ADMIN — SODIUM CHLORIDE, PRESERVATIVE FREE 10 ML: 5 INJECTION INTRAVENOUS at 10:32

## 2024-03-26 RX ADMIN — ACETAMINOPHEN 650 MG: 325 TABLET ORAL at 20:32

## 2024-03-26 RX ADMIN — CEFTRIAXONE 1000 MG: 1 INJECTION, POWDER, FOR SOLUTION INTRAMUSCULAR; INTRAVENOUS at 13:13

## 2024-03-26 RX ADMIN — RANOLAZINE 500 MG: 500 TABLET, EXTENDED RELEASE ORAL at 10:30

## 2024-03-26 ASSESSMENT — PAIN DESCRIPTION - ONSET
ONSET: GRADUAL

## 2024-03-26 ASSESSMENT — PAIN DESCRIPTION - LOCATION
LOCATION: THROAT
LOCATION: THROAT
LOCATION: HEAD
LOCATION: LEG
LOCATION: LEG

## 2024-03-26 ASSESSMENT — PAIN SCALES - WONG BAKER

## 2024-03-26 ASSESSMENT — PAIN - FUNCTIONAL ASSESSMENT
PAIN_FUNCTIONAL_ASSESSMENT: ACTIVITIES ARE NOT PREVENTED

## 2024-03-26 ASSESSMENT — PAIN SCALES - GENERAL
PAINLEVEL_OUTOF10: 5
PAINLEVEL_OUTOF10: 6
PAINLEVEL_OUTOF10: 8
PAINLEVEL_OUTOF10: 7

## 2024-03-26 ASSESSMENT — PAIN DESCRIPTION - ORIENTATION
ORIENTATION: LEFT;RIGHT
ORIENTATION: RIGHT;LEFT
ORIENTATION: ANTERIOR

## 2024-03-26 ASSESSMENT — PAIN DESCRIPTION - PAIN TYPE
TYPE: ACUTE PAIN

## 2024-03-26 ASSESSMENT — PAIN DESCRIPTION - DESCRIPTORS
DESCRIPTORS: ACHING

## 2024-03-26 ASSESSMENT — PAIN DESCRIPTION - FREQUENCY
FREQUENCY: CONTINUOUS

## 2024-03-26 NOTE — PLAN OF CARE
Problem: Discharge Planning  Goal: Discharge to home or other facility with appropriate resources  Outcome: Progressing     Problem: Respiratory - Adult  Goal: Achieves optimal ventilation and oxygenation  Outcome: Progressing     Problem: Infection - Adult  Goal: Absence of infection at discharge  Outcome: Progressing  Goal: Absence of infection during hospitalization  Outcome: Progressing  Goal: Absence of fever/infection during anticipated neutropenic period  Outcome: Progressing     Problem: Anxiety  Goal: Will report anxiety at manageable levels  Description: INTERVENTIONS:  1. Administer medication as ordered  2. Teach and rehearse alternative coping skills  3. Provide emotional support with 1:1 interaction with staff  Outcome: Progressing     Problem: Coping  Goal: Pt/Family able to verbalize concerns and demonstrate effective coping strategies  Description: INTERVENTIONS:  1. Assist patient/family to identify coping skills, available support systems and cultural and spiritual values  2. Provide emotional support, including active listening and acknowledgement of concerns of patient and caregivers  3. Reduce environmental stimuli, as able  4. Instruct patient/family in relaxation techniques, as appropriate  5. Assess for spiritual pain/suffering and initiate Spiritual Care, Psychosocial Clinical Specialist consults as needed  Outcome: Progressing     Problem: Safety - Adult  Goal: Free from fall injury  Outcome: Progressing     Problem: ABCDS Injury Assessment  Goal: Absence of physical injury  Outcome: Progressing     Problem: Nutrition Deficit:  Goal: Optimize nutritional status  Outcome: Progressing     Problem: Pain  Goal: Verbalizes/displays adequate comfort level or baseline comfort level  Outcome: Progressing

## 2024-03-26 NOTE — CARE COORDINATION
CM attempted to see pt. Had on full face mask for breathing. Stated she was just starting to rest and does not want to speak now. CM to f/u later.      1330 - Spoke with pt and brendon Mcmullen in room. Pt is agreeable to SNF. Would like to return to [facility] at this time. Star is aware. She confirmed they are able to accept the pt.       Returning to [facility]. Will require precert. No PAS required.

## 2024-03-26 NOTE — PROGRESS NOTES
03/26/24 0757   NIV Type   NIV Started/Stopped On   Equipment Type Trellegy   Mode Bilevel   Mask Type Full face mask   Mask Size Medium   Assessment   SpO2 96 %   Level of Consciousness 0   Comfort Level Good   Using Accessory Muscles No   Mask Compliance Good   Breath Sounds   Breath Sounds Bilateral Diminished   Settings/Measurements   IPAP 16 cmH20   CPAP/EPAP 6 cmH2O   Vt (Measured) 497 mL   Rate Ordered 12   FiO2  35 %   Minute Volume (L/min) 16.7 Liters   Patient's Home Machine No   Alarm Settings   Alarms On Y

## 2024-03-26 NOTE — CARE COORDINATION
03/26/24 1540   Service Assessment   Patient Orientation Unable to Assess   History Provided By Medical Record   Primary Caregiver Self   Support Systems Children;Family Members   PCP Verified by CM No   Prior Functional Level   (Pt appears independent from chart review)   Financial Resources Medicaid   Social/Functional History   Lives With Son   Type of Home Apartment   Discharge Planning   Type of Residence Apartment   Living Arrangements Children;Family Members   Potential Assistance Purchasing Medications No   Type of Home Care Services None   Patient expects to be discharged to: Apartment     CM reviewed chart and screened for discharge needs. Pt has insurance and a pcp. Pt is from home with son and son's girlfriend. Pt appears independent with ADLs. Plan home with son. CM available should needs arise.

## 2024-03-26 NOTE — PROGRESS NOTES
pericardial effusion.     XR CHEST PORTABLE    Result Date: 3/16/2024  XR CHEST PORTABLE Indication: Shortness of breath. COPD. COMPARISON: 2/4/2024 Findings: Frontal view of the chest was obtained. The heart is normal in size and configuration. The lungs are clear. There is no effusion or pneumothorax.     Impression: No acute cardiopulmonary abnormality. Electronically signed by Mich Koch MD      CBC:   Recent Labs     03/24/24  0430 03/25/24 0418 03/26/24  0218   WBC 22.1* 16.1* 12.8*   HGB 14.5 12.6 12.9    251 245     BMP:    Recent Labs     03/24/24  0430 03/25/24  0418    139   K 4.7 5.1   CL 99 101   CO2 28 27   BUN 16 18   CREATININE 0.6 0.6   GLUCOSE 129* 103*     Hepatic:   Recent Labs     03/24/24  0430   AST 28   ALT 31   BILITOT 0.4   ALKPHOS 98     Lipids:   Lab Results   Component Value Date/Time    CHOL 192 01/24/2024 03:12 AM    CHOL 165 03/21/2023 12:00 AM    HDL 83 01/24/2024 03:12 AM    TRIG 39 01/24/2024 03:12 AM     Hemoglobin A1C:   Lab Results   Component Value Date/Time    LABA1C 5.5 02/05/2024 05:25 AM     TSH:   Lab Results   Component Value Date/Time    TSH 2.50 07/28/2016 02:26 PM     Troponin:   Lab Results   Component Value Date/Time    TROPONINT <0.010 04/17/2020 11:00 AM     Lactic Acid: No results for input(s): \"LACTA\" in the last 72 hours.  BNP:   Recent Labs     03/24/24 0430   PROBNP 401.7*     UA:  Lab Results   Component Value Date/Time    NITRU NEGATIVE 03/16/2024 04:15 AM    COLORU YELLOW 03/16/2024 04:15 AM    PHUR 5.5 02/22/2017 03:37 PM    WBCUA 0 TO 1 03/18/2016 03:00 PM    RBCUA NEGATIVE 03/18/2016 03:00 PM    MUCUS 1+ 09/30/2013 05:45 PM    BACTERIA LARGE 03/18/2016 03:00 PM    CLARITYU CLEAR 03/16/2024 04:15 AM    SPECGRAV 1.020 03/16/2024 04:15 AM    LEUKOCYTESUR NEGATIVE 03/16/2024 04:15 AM    UROBILINOGEN 0.2 03/16/2024 04:15 AM    BILIRUBINUR NEGATIVE 03/16/2024 04:15 AM    BILIRUBINUR Negative 02/22/2017 03:37 PM    BLOODU NEGATIVE 03/16/2024  04:15 AM    GLUCOSEU Negative 02/22/2017 03:37 PM    KETUA NEGATIVE 03/16/2024 04:15 AM     Urine Cultures: No results found for: \"LABURIN\"  Blood Cultures: No results found for: \"BC\"  No results found for: \"BLOODCULT2\"  Organism: No results found for: \"ORG\"      Electronically signed by Brigitte Forman MD on 3/26/2024 at 8:26 AM

## 2024-03-26 NOTE — PROGRESS NOTES
Comprehensive Nutrition Assessment    Type and Reason for Visit:  Reassess    Nutrition Recommendations/Plan:   Trial variety of high protein oral nutrition supplements during admission  Continue Regular Diet   Consider IVF delivery for better hydration if po intakes/fluid intake remain poor while on BiPAP treatments  Please document all PO intakes in I/O for trends      Malnutrition Assessment:  Malnutrition Status:  Severe malnutrition (03/26/24 1225)    Context:  Chronic Illness     Findings of the 6 clinical characteristics of malnutrition:  Energy Intake:  75% or less estimated energy requirements for 1 month or longer  Weight Loss:  No significant weight loss     Body Fat Loss:  Severe body fat loss Triceps, Fat Overlying Ribs, Orbital   Muscle Mass Loss:  Severe muscle mass loss Clavicles (pectoralis & deltoids), Thigh (quadriceps), Hand (interosseous), Scapula (trapezius)  Fluid Accumulation:  No significant fluid accumulation Extremities   Strength:  Not Performed    Nutrition Assessment:    Pt ongoing BIPAP treatment, c/o poor sleep. Has not eaten anything due to increased SOB. Discussed importance of nutrition for healing with increased respiratory distress. Performed NFPE, pt is thin frame, severe muscle/fat wasting examined, meets criteria for malnutrition. Pt was requesting RD to leave for better sleep, education not appropriate at this time. Diet advanced to regular, will offer supplements. Follow as high nutrition risk.    Nutrition Related Findings:    Smoker. Glu 103, WBC 12.8, deltasone, on 6L/min. C/o LE cramping at visit. Wound Type: None       Current Nutrition Intake & Therapies:    Average Meal Intake: 0%  Average Supplements Intake: Unable to assess  ADULT DIET; Regular  ADULT ORAL NUTRITION SUPPLEMENT; Breakfast, Lunch, Dinner; Standard High Calorie/High Protein Oral Supplement  ADULT ORAL NUTRITION SUPPLEMENT; Lunch, Breakfast, Dinner; Frozen Oral Supplement    Anthropometric

## 2024-03-26 NOTE — PROGRESS NOTES
Pulmonary and Critical Care  Progress Note      VITALS:  /62   Pulse 68   Temp 97.3 °F (36.3 °C) (Oral)   Resp 18   Ht 1.473 m (4' 10\")   Wt 33.7 kg (74 lb 4.7 oz)   SpO2 96%   BMI 15.53 kg/m²     Subjective:   CHIEF COMPLAINT :SOB     HPI:                The patient is a 63 y.o. female is sitting in the bed. She is in mild resp distress      Objective:   PHYSICAL EXAM:    LUNGS:Occasional exp wheeze  Abd-soft,BS+,NT  Ext- no pedal edema  CVS-s1s2, no murmurs      DATA:    CBC:  Recent Labs     03/24/24  0430 03/25/24  0418 03/26/24  0218   WBC 22.1* 16.1* 12.8*   RBC 4.38 3.92* 4.01*   HGB 14.5 12.6 12.9   HCT 42.6 39.5 40.4    251 245   MCV 97.3 100.8* 100.7*   MCH 33.1* 32.1* 32.2*   MCHC 34.0 31.9* 31.9*   RDW 12.4 13.0 12.8   SEGSPCT 85.9* 92.1* 91.6*      BMP:  Recent Labs     03/24/24  0430 03/25/24  0418    139   K 4.7 5.1   CL 99 101   CO2 28 27   BUN 16 18   CREATININE 0.6 0.6   CALCIUM 8.2* 9.3   GLUCOSE 129* 103*      ABG:  No results for input(s): \"PH\", \"PO2ART\", \"LZN5AOY\", \"HCO3\", \"BEART\", \"O2SAT\" in the last 72 hours.  BNP  No results found for: \"BNP\"   D-Dimer:  No results found for: \"DDIMER\"   Radiology: None      Assessment/Plan     Patient Active Problem List    Diagnosis Date Noted    Acute hypoxemic respiratory failure ([facility]) 03/24/2024    NSTEMI (non-ST elevated myocardial infarction) ([facility]) 03/16/2024    COPD exacerbation ([facility]) 02/05/2024    Severe malnutrition ([facility]) 01/24/2024     Class: Chronic    Chronic obstructive pulmonary disease with acute exacerbation ([facility]) 01/23/2024    Coronary artery disease involving native coronary artery of native heart with angina pectoris (HCC) 01/23/2024     Overview Note:     Coronary anatomy:   The left main coronary artery has no significant disease.      Left anterior descending artery has no significant disease.     Ramus has 60-70% proximal segment stenosis     Circumflex artery has no significant disease 60-70% distal segment

## 2024-03-27 ENCOUNTER — APPOINTMENT (OUTPATIENT)
Dept: GENERAL RADIOLOGY | Age: 64
End: 2024-03-27
Attending: INTERNAL MEDICINE
Payer: MEDICAID

## 2024-03-27 LAB
ANION GAP SERPL CALCULATED.3IONS-SCNC: 7 MMOL/L (ref 7–16)
BASE EXCESS MIXED: 9.7 (ref 0–3)
BASOPHILS ABSOLUTE: 0 K/CU MM
BASOPHILS RELATIVE PERCENT: 0.1 % (ref 0–1)
BUN SERPL-MCNC: 26 MG/DL (ref 6–23)
CALCIUM SERPL-MCNC: 8.1 MG/DL (ref 8.3–10.6)
CHLORIDE BLD-SCNC: 99 MMOL/L (ref 99–110)
CO2: 30 MMOL/L (ref 21–32)
COMMENT: ABNORMAL
CREAT SERPL-MCNC: 0.6 MG/DL (ref 0.6–1.1)
DIFFERENTIAL TYPE: ABNORMAL
EOSINOPHILS ABSOLUTE: 0 K/CU MM
EOSINOPHILS RELATIVE PERCENT: 0 % (ref 0–3)
GFR SERPL CREATININE-BSD FRML MDRD: >90 ML/MIN/1.73M2
GLUCOSE SERPL-MCNC: 214 MG/DL (ref 70–99)
HCO3 VENOUS: 35.4 MMOL/L (ref 22–29)
HCT VFR BLD CALC: 36.9 % (ref 37–47)
HEMOGLOBIN: 12.2 GM/DL (ref 12.5–16)
IMMATURE NEUTROPHIL %: 0.4 % (ref 0–0.43)
LYMPHOCYTES ABSOLUTE: 0.4 K/CU MM
LYMPHOCYTES RELATIVE PERCENT: 3 % (ref 24–44)
MCH RBC QN AUTO: 32.3 PG (ref 27–31)
MCHC RBC AUTO-ENTMCNC: 33.1 % (ref 32–36)
MCV RBC AUTO: 97.6 FL (ref 78–100)
MONOCYTES ABSOLUTE: 0.8 K/CU MM
MONOCYTES RELATIVE PERCENT: 5.8 % (ref 0–4)
NUCLEATED RBC %: 0 %
O2 SAT, VEN: 96.7 % (ref 50–70)
PCO2, VEN: 51 MMHG (ref 41–51)
PDW BLD-RTO: 12.5 % (ref 11.7–14.9)
PH VENOUS: 7.45 (ref 7.32–7.43)
PLATELET # BLD: 243 K/CU MM (ref 140–440)
PMV BLD AUTO: 10.3 FL (ref 7.5–11.1)
PO2, VEN: 188 MMHG (ref 28–48)
POTASSIUM SERPL-SCNC: 4.3 MMOL/L (ref 3.5–5.1)
RBC # BLD: 3.78 M/CU MM (ref 4.2–5.4)
SEGMENTED NEUTROPHILS ABSOLUTE COUNT: 12.6 K/CU MM
SEGMENTED NEUTROPHILS RELATIVE PERCENT: 90.7 % (ref 36–66)
SODIUM BLD-SCNC: 136 MMOL/L (ref 135–145)
TOTAL IMMATURE NEUTOROPHIL: 0.05 K/CU MM
TOTAL NUCLEATED RBC: 0 K/CU MM
WBC # BLD: 13.8 K/CU MM (ref 4–10.5)

## 2024-03-27 PROCEDURE — 6370000000 HC RX 637 (ALT 250 FOR IP): Performed by: INTERNAL MEDICINE

## 2024-03-27 PROCEDURE — 94660 CPAP INITIATION&MGMT: CPT

## 2024-03-27 PROCEDURE — 6370000000 HC RX 637 (ALT 250 FOR IP): Performed by: STUDENT IN AN ORGANIZED HEALTH CARE EDUCATION/TRAINING PROGRAM

## 2024-03-27 PROCEDURE — 82805 BLOOD GASES W/O2 SATURATION: CPT

## 2024-03-27 PROCEDURE — 85025 COMPLETE CBC W/AUTO DIFF WBC: CPT

## 2024-03-27 PROCEDURE — 97166 OT EVAL MOD COMPLEX 45 MIN: CPT

## 2024-03-27 PROCEDURE — 80048 BASIC METABOLIC PNL TOTAL CA: CPT

## 2024-03-27 PROCEDURE — 36415 COLL VENOUS BLD VENIPUNCTURE: CPT

## 2024-03-27 PROCEDURE — 2580000003 HC RX 258: Performed by: STUDENT IN AN ORGANIZED HEALTH CARE EDUCATION/TRAINING PROGRAM

## 2024-03-27 PROCEDURE — 97161 PT EVAL LOW COMPLEX 20 MIN: CPT

## 2024-03-27 PROCEDURE — 97535 SELF CARE MNGMENT TRAINING: CPT

## 2024-03-27 PROCEDURE — 74018 RADEX ABDOMEN 1 VIEW: CPT

## 2024-03-27 PROCEDURE — 1200000000 HC SEMI PRIVATE

## 2024-03-27 PROCEDURE — 97116 GAIT TRAINING THERAPY: CPT

## 2024-03-27 PROCEDURE — 6360000002 HC RX W HCPCS: Performed by: STUDENT IN AN ORGANIZED HEALTH CARE EDUCATION/TRAINING PROGRAM

## 2024-03-27 PROCEDURE — 2580000003 HC RX 258: Performed by: INTERNAL MEDICINE

## 2024-03-27 PROCEDURE — 99232 SBSQ HOSP IP/OBS MODERATE 35: CPT | Performed by: INTERNAL MEDICINE

## 2024-03-27 PROCEDURE — 94761 N-INVAS EAR/PLS OXIMETRY MLT: CPT

## 2024-03-27 PROCEDURE — 94640 AIRWAY INHALATION TREATMENT: CPT

## 2024-03-27 PROCEDURE — 2700000000 HC OXYGEN THERAPY PER DAY

## 2024-03-27 PROCEDURE — 6360000002 HC RX W HCPCS: Performed by: INTERNAL MEDICINE

## 2024-03-27 RX ORDER — POLYETHYLENE GLYCOL 3350 17 G/17G
17 POWDER, FOR SOLUTION ORAL DAILY
Status: DISCONTINUED | OUTPATIENT
Start: 2024-03-27 | End: 2024-04-05 | Stop reason: HOSPADM

## 2024-03-27 RX ORDER — SENNOSIDES A AND B 8.6 MG/1
1 TABLET, FILM COATED ORAL NIGHTLY
Status: DISCONTINUED | OUTPATIENT
Start: 2024-03-27 | End: 2024-04-05 | Stop reason: HOSPADM

## 2024-03-27 RX ADMIN — IPRATROPIUM BROMIDE AND ALBUTEROL SULFATE 1 DOSE: .5; 2.5 SOLUTION RESPIRATORY (INHALATION) at 11:31

## 2024-03-27 RX ADMIN — BUSPIRONE HYDROCHLORIDE 5 MG: 5 TABLET ORAL at 10:31

## 2024-03-27 RX ADMIN — SODIUM CHLORIDE, PRESERVATIVE FREE 10 ML: 5 INJECTION INTRAVENOUS at 10:33

## 2024-03-27 RX ADMIN — CLOPIDOGREL BISULFATE 75 MG: 75 TABLET ORAL at 10:31

## 2024-03-27 RX ADMIN — ALBUTEROL SULFATE 2 PUFF: 90 AEROSOL, METERED RESPIRATORY (INHALATION) at 04:08

## 2024-03-27 RX ADMIN — ENOXAPARIN SODIUM 30 MG: 100 INJECTION SUBCUTANEOUS at 10:32

## 2024-03-27 RX ADMIN — CEFTRIAXONE 1000 MG: 1 INJECTION, POWDER, FOR SOLUTION INTRAMUSCULAR; INTRAVENOUS at 12:02

## 2024-03-27 RX ADMIN — ACETAMINOPHEN 650 MG: 325 TABLET ORAL at 10:31

## 2024-03-27 RX ADMIN — OSELTAMIVIR PHOSPHATE 30 MG: 75 CAPSULE ORAL at 10:31

## 2024-03-27 RX ADMIN — PANTOPRAZOLE SODIUM 40 MG: 40 TABLET, DELAYED RELEASE ORAL at 05:33

## 2024-03-27 RX ADMIN — IPRATROPIUM BROMIDE AND ALBUTEROL SULFATE 1 DOSE: .5; 2.5 SOLUTION RESPIRATORY (INHALATION) at 22:30

## 2024-03-27 RX ADMIN — RANOLAZINE 500 MG: 500 TABLET, EXTENDED RELEASE ORAL at 10:31

## 2024-03-27 RX ADMIN — PREDNISONE 40 MG: 10 TABLET ORAL at 10:32

## 2024-03-27 RX ADMIN — TIOTROPIUM BROMIDE AND OLODATEROL 2 PUFF: 3.124; 2.736 SPRAY, METERED RESPIRATORY (INHALATION) at 11:31

## 2024-03-27 RX ADMIN — ROSUVASTATIN CALCIUM 20 MG: 20 TABLET, COATED ORAL at 10:32

## 2024-03-27 RX ADMIN — ACETAMINOPHEN 650 MG: 325 TABLET ORAL at 05:15

## 2024-03-27 RX ADMIN — DEXTROSE AND SODIUM CHLORIDE: 5; 900 INJECTION, SOLUTION INTRAVENOUS at 05:14

## 2024-03-27 RX ADMIN — AZITHROMYCIN MONOHYDRATE 500 MG: 500 INJECTION, POWDER, LYOPHILIZED, FOR SOLUTION INTRAVENOUS at 10:39

## 2024-03-27 RX ADMIN — POLYETHYLENE GLYCOL 3350 17 G: 17 POWDER, FOR SOLUTION ORAL at 11:58

## 2024-03-27 RX ADMIN — IPRATROPIUM BROMIDE AND ALBUTEROL SULFATE 1 DOSE: .5; 2.5 SOLUTION RESPIRATORY (INHALATION) at 15:39

## 2024-03-27 ASSESSMENT — PAIN DESCRIPTION - LOCATION
LOCATION: HEAD
LOCATION: HEAD
LOCATION: THROAT

## 2024-03-27 ASSESSMENT — PAIN DESCRIPTION - PAIN TYPE
TYPE: ACUTE PAIN
TYPE: ACUTE PAIN

## 2024-03-27 ASSESSMENT — PAIN DESCRIPTION - ONSET
ONSET: OTHER (COMMENT)
ONSET: ON-GOING

## 2024-03-27 ASSESSMENT — PAIN DESCRIPTION - DESCRIPTORS
DESCRIPTORS: ACHING

## 2024-03-27 ASSESSMENT — PAIN SCALES - GENERAL
PAINLEVEL_OUTOF10: 8
PAINLEVEL_OUTOF10: 3
PAINLEVEL_OUTOF10: 6

## 2024-03-27 ASSESSMENT — PAIN DESCRIPTION - FREQUENCY
FREQUENCY: INTERMITTENT
FREQUENCY: INTERMITTENT

## 2024-03-27 ASSESSMENT — PAIN DESCRIPTION - ORIENTATION: ORIENTATION: MID

## 2024-03-27 NOTE — PROGRESS NOTES
V2.0    [facility] Progress Note      Name:  Kate Minor /Age/Sex: 1960  (63 y.o. female)   MRN & CSN:  [id number] & [id number] Encounter Date/Time: 3/27/2024 7:52 AM EDT   Location:  -A PCP: Sharyn Delatorre APRN - CNP     Attending:Brigitte Forman MD       Hospital Day: 4    Assessment and Recommendations   Kate Minor is a 63 y.o. female with pmh of COPD, hypertension, Salas's esophagus, protein calorie malnutrition  who presents with Acute hypoxemic respiratory failure (HCC)      # Sepsis and acute  Respiratory Failure with hypoxia and hypercapnia Secondary to Flu/COPD exacerbation/CAP: Presented with shortness of breath and sore throat, SIRS 4/4 with tachycardia, tachypnea, temperature, elevated WBC lactic acid normal, VBG showed pCO2 55-59, requiring BiPAP, respiratory panel influenza positive, started on ceftriaxone, azithromycin, oseltamivir, obtain blood cultures no growth till date,  procalcitonin 1.93, wean off O2 as tolerated.,  Continue inhalers, continue steroids,     # Influenza Positive: Started on Tamiflu and cough suppressants, throat lonzenge.    # COPD Exacerbation: Continue scheduled bronchodilators and steroid therapy, continuous pulse ox    # Constipation: KUB for stool burden, if stool continue bowel protocol    # History of Anxiety: Continue BuSpar    # Hx CAD: Continue Plavix and Statin    # Hx GERD/Salas's Esophagus:  Continue PPI    # Hypertension: On amlodipine 5 mg daily and Lopressor 25 mg daily, hold due to hypotension    # Hyperlipidemia, continue statin    # Severe malnutrition suspected protein calorie: BMI 15.53    # Active tobacco use: Continue encourage for cessation    Comment: Please note this report has been produced using speech recognition software and may contain errors related to that system including errors in grammar, punctuation, and spelling, as well as words and phrases that may be inappropriate. If there are any questions or  Electronically signed by Karthikeyan De La Vega MD    Echo (TTE) limited (PRN contrast/bubble/strain/3D)    Result Date: 3/18/2024    Left Ventricle: Hyperdynamic left ventricular systolic function with a visually estimated EF of 75 - 80%. Left ventricle size is normal. Normal wall thickness.   Aortic Valve: Mild to moderate regurgitation. AV PHT is 507.0 ms.   Mitral Valve: Mild regurgitation.   Tricuspid Valve: Mild regurgitation. The estimated RVSP is 44 mmHg.   Pericardium: No pericardial effusion.     XR CHEST PORTABLE    Result Date: 3/16/2024  XR CHEST PORTABLE Indication: Shortness of breath. COPD. COMPARISON: 2/4/2024 Findings: Frontal view of the chest was obtained. The heart is normal in size and configuration. The lungs are clear. There is no effusion or pneumothorax.     Impression: No acute cardiopulmonary abnormality. Electronically signed by Mich Koch MD      CBC:   Recent Labs     03/25/24  0418 03/26/24  0218 03/27/24  0202   WBC 16.1* 12.8* 13.8*   HGB 12.6 12.9 12.2*    245 243     BMP:    Recent Labs     03/25/24  0418 03/27/24  0202    136   K 5.1 4.3    99   CO2 27 30   BUN 18 26*   CREATININE 0.6 0.6   GLUCOSE 103* 214*     Hepatic:   No results for input(s): \"AST\", \"ALT\", \"ALB\", \"BILITOT\", \"ALKPHOS\" in the last 72 hours.    Lipids:   Lab Results   Component Value Date/Time    CHOL 192 01/24/2024 03:12 AM    CHOL 165 03/21/2023 12:00 AM    HDL 83 01/24/2024 03:12 AM    TRIG 39 01/24/2024 03:12 AM     Hemoglobin A1C:   Lab Results   Component Value Date/Time    LABA1C 5.5 02/05/2024 05:25 AM     TSH:   Lab Results   Component Value Date/Time    TSH 2.50 07/28/2016 02:26 PM     Troponin:   Lab Results   Component Value Date/Time    TROPONINT <0.010 04/17/2020 11:00 AM     Lactic Acid: No results for input(s): \"LACTA\" in the last 72 hours.  BNP:   No results for input(s): \"PROBNP\" in the last 72 hours.    UA:  Lab Results   Component Value Date/Time    NITRU NEGATIVE 03/16/2024

## 2024-03-27 NOTE — CONSULTS
[facility] ACUTE CARE PHYSICAL THERAPY EVALUATION  Kate Minor, 1960, 2018/2018-A, 3/27/2024    History  [place]:  There were no encounter diagnoses.  Patient  has a past medical history of Salas's esophagus, Elevated troponin, Emphysema of lung (HCC), H/O Doppler ultrasound carotid, H/O echocardiogram, H/O exercise stress test, History of Holter monitoring, History of nuclear stress test, HLD (hyperlipidemia), Hypertension, NSTEMI (non-ST elevated myocardial infarction) (HCC), Pulmonary emphysema (HCC), and TIA (transient ischemic attack).  Patient  has a past surgical history that includes Appendectomy; Colonoscopy; Colonoscopy (N/A, 6/8/2023); Upper gastrointestinal endoscopy (N/A, 6/8/2023); and Cardiac procedure (N/A, 1/24/2024).    Discharge Recommendation: Patient has minimal/mild post-acute PT/OT therapy service needs. Encourage facility for post-acute rehabilitation, anticipate 1-2 hours per day and 5 days per week.    Subjective:    Patient states:  \"I threw a can down the bro trying to get my sons attention.\"      Pain:  did not state numerical value.      Communication with other providers:  Handoff to RNDarlin, co-eval with INA Back for safety and tolerance    Restrictions: General Precautions, Fall Risk    Home Setup/Prior level of function  Social/Functional History  Lives With: Son, Family  Type of Home: Apartment  Home Layout: One level  Home Access: Stairs to enter with rails  Entrance Stairs - Number of Steps: 37 stairs to get to apartment  Bathroom Shower/Tub: Tub/Shower unit  Bathroom Toilet: Standard  Bathroom Equipment: Tub transfer bench  Has the patient had two or more falls in the past year or any fall with injury in the past year?: No  Receives Help From: Family  ADL Assistance: Independent  Homemaking Assistance: Independent (light cleaning. family completes rest)  Ambulation Assistance: Independent  Transfer Assistance: Independent    Examination of body

## 2024-03-27 NOTE — CONSULTS
[facility] ACUTE CARE OCCUPATIONAL THERAPY EVALUATION    Kate Minor, 1960, 2018/2018-A, 3/27/2024      Discharge Recommendation: Patient has moderate post-acute PT/OT therapy service needs. Encourage facility for post-acute rehabilitation, anticipate 1-2 hours per day and 5 days per week. To be safe at home, would need significant support/supervision, assistance for IADLs, physical assist for home entry/exit, and home health PT/OT service (S3, S4).    History:  [place]:  There were no encounter diagnoses.  Past Medical History:   Diagnosis Date    Salas's esophagus     Elevated troponin 01/23/2024    Emphysema of lung (HCC)     H/O Doppler ultrasound carotid 05/16/2023    Carotid Moderate (50-69%) disease of the Right proximal Internal carotid artery. Mild (0-49%) disease of the Left mid Internal carotid artery.    H/O echocardiogram 05/16/2023    Echo EF 55-60%. Doppler evaluation reveals mild aortic, mild to moderate mitral, and mild tricuspid regurgitation. Right ventricular systolic pressure of 45 mmHg consistent with mild to moderate pulmonary hypertension    H/O exercise stress test 01/05/2016    Good exercise capacity. EF 70% Normal test.    History of Holter monitoring 05/03/2023    Normal sinus rhythm with frequent complex supraventricular ectopy and isolated nonsustained 7 beat run of atrial tachycardia.    History of nuclear stress test 05/16/2023    NM Normal isotope uptake following exercise and at rest. There is no evidence of exercise induced ischemia.    HLD (hyperlipidemia)     Hypertension     NSTEMI (non-ST elevated myocardial infarction) ([facility]) 01/23/2024 1/23/2024    Pulmonary emphysema (HCC)     TIA (transient ischemic attack)          Subjective:  Patient states: \"I just got bad like this recently and I lost a lot of weight without trying\"  Pain: denied   Communication with other providers: RN, PT   Restrictions: general precautions, fall risk, droplet + ISO   No one  at bedside    Home Setup/Prior level of function:  Social/Functional History  Lives With: Son, Family  Type of Home: Apartment  Home Layout: One level  Home Access: Stairs to enter with rails  Entrance Stairs - Number of Steps: 37 stairs to get to apartment  Bathroom Shower/Tub: Tub/Shower unit  Bathroom Toilet: Standard  Bathroom Equipment: Tub transfer bench  Has the patient had two or more falls in the past year or any fall with injury in the past year?: No  Receives Help From: Family  ADL Assistance: Independent  Homemaking Assistance: Independent (light cleaning. family completes rest)  Ambulation Assistance: Independent  Transfer Assistance: Independent    Examination:  Observation: Pt was in bed upon arrival, agreeable to session  Vision: WFL  Hearing: WFL  Objective Measures: 98% on 3L O2 supp, does not wear at baseline. BP measured 172/78 (105) supine in bed.  Pt demo desat to ~85% s/p ambulation and required inc time to rebound to 90s with cues for PLB.     Body Systems and functions:  ROM: WFL in BUE  Strength: 4/5 in BUE  Sensation: WFL  Tone: normotonic in BUE  Coordination: movements fluid and coordinated  Posture: normal posture  Activity Tolerance: Good     Activities of Daily Living (ADLs):  Feeding: SetupA   Grooming: CGA (standing sinkside)   Toileting: CGA (over standard commode)   UB dressing: SetupA   LB dressing: SetupA   UB bathing: Mary   LB bathing: Mary       *pt ADL function inferred from gross functional assessment of mobility, balance, posture, safety awareness, activity tolerance (unless otherwise indicated)    Cognitive and Psychosocial Functioning:  Overall cognitive status: WFL   Affect: normal     Balance:   Sitting: fair+   Standing: fair+     Functional Mobility:  Rolling Laterally in Bed: SBA   Supine to Sit: SBA   Sit to supine: SBA   Scooting: SBA   Sit to Stand: CGA from EOB and standard commode     Ambulation: CGA using no AD for ~30'. See PT note for further details

## 2024-03-27 NOTE — PROGRESS NOTES
Pulmonary and Critical Care  Progress Note      VITALS:  BP (!) 144/99   Pulse 86   Temp 99.2 °F (37.3 °C) (Oral)   Resp 26   Ht 1.473 m (4' 10\")   Wt 33 kg (72 lb 12 oz)   SpO2 97%   BMI 15.21 kg/m²     Subjective:   CHIEF COMPLAINT :SOB     HPI:                The patient is a 63 y.o. female is sitting in the bed, eating lunch. She is in mild resp distress    Objective:   PHYSICAL EXAM:    LUNGS:Occasional exp wheeze  Abd-soft,BS+,NT  Ext- no pedal edema  CVS-s1s2, no murmurs      DATA:    CBC:  Recent Labs     03/25/24  0418 03/26/24  0218 03/27/24  0202   WBC 16.1* 12.8* 13.8*   RBC 3.92* 4.01* 3.78*   HGB 12.6 12.9 12.2*   HCT 39.5 40.4 36.9*    245 243   .8* 100.7* 97.6   MCH 32.1* 32.2* 32.3*   MCHC 31.9* 31.9* 33.1   RDW 13.0 12.8 12.5   SEGSPCT 92.1* 91.6* 90.7*      BMP:  Recent Labs     03/25/24  0418 03/27/24  0202    136   K 5.1 4.3    99   CO2 27 30   BUN 18 26*   CREATININE 0.6 0.6   CALCIUM 9.3 8.1*   GLUCOSE 103* 214*      ABG:  No results for input(s): \"PH\", \"PO2ART\", \"ITD4OQD\", \"HCO3\", \"BEART\", \"O2SAT\" in the last 72 hours.  BNP  No results found for: \"BNP\"   D-Dimer:  No results found for: \"DDIMER\"   Radiology: Nonobstructive bowel gas pattern with a moderate stool burden.         Assessment/Plan     Patient Active Problem List    Diagnosis Date Noted    Acute hypoxemic respiratory failure (HCC) 03/24/2024    NSTEMI (non-ST elevated myocardial infarction) ([facility]) 03/16/2024    COPD exacerbation ([facility]) 02/05/2024    Severe malnutrition ([facility]) 01/24/2024     Class: Chronic    Chronic obstructive pulmonary disease with acute exacerbation ([facility]) 01/23/2024    Coronary artery disease involving native coronary artery of native heart with angina pectoris ([facility]) 01/23/2024     Overview Note:     Coronary anatomy:   The left main coronary artery has no significant disease.      Left anterior descending artery has no significant disease.     Ramus has 60-70% proximal segment

## 2024-03-27 NOTE — PROGRESS NOTES
notified: Pt complaining of choking and food getting stuck while eating dinner, states she feels like a \"pocket of food\" is stuck in her throat on the left side and that she is having trouble swallowing.      Pt states she was supposed to be seen for scope for \"possible throat cancer\" several months ago for difficulty swallowing but wasn't able to due to other medical issues

## 2024-03-27 NOTE — PLAN OF CARE
Problem: Discharge Planning  Goal: Discharge to home or other facility with appropriate resources  Outcome: Progressing  Flowsheets (Taken 3/27/2024 0800)  Discharge to home or other facility with appropriate resources:   Identify barriers to discharge with patient and caregiver   Arrange for needed discharge resources and transportation as appropriate   Identify discharge learning needs (meds, wound care, etc)   Refer to discharge planning if patient needs post-hospital services based on physician order or complex needs related to functional status, cognitive ability or social support system     Problem: Respiratory - Adult  Goal: Achieves optimal ventilation and oxygenation  Outcome: Progressing  Flowsheets (Taken 3/27/2024 0800)  Achieves optimal ventilation and oxygenation:   Assess for changes in respiratory status   Assess for changes in mentation and behavior   Position to facilitate oxygenation and minimize respiratory effort   Oxygen supplementation based on oxygen saturation or arterial blood gases   Initiate smoking cessation protocol as indicated   Encourage broncho-pulmonary hygiene including cough, deep breathe, incentive spirometry   Assess the need for suctioning and aspirate as needed   Assess and instruct to report shortness of breath or any respiratory difficulty   Respiratory therapy support as indicated     Problem: Infection - Adult  Goal: Absence of infection at discharge  Outcome: Progressing  Flowsheets (Taken 3/27/2024 0800)  Absence of infection at discharge:   Assess and monitor for signs and symptoms of infection   Monitor lab/diagnostic results   Monitor all insertion sites i.e., indwelling lines, tubes and drains   Monitor endotracheal (as able) and nasal secretions for changes in amount and color   [place] appropriate cooling/warming therapies per order   Administer medications as ordered   Instruct and encourage patient and family to use good hand hygiene technique   Identify and  able  4. Instruct patient/family in relaxation techniques, as appropriate  5. Assess for spiritual pain/suffering and initiate Spiritual Care, Psychosocial Clinical Specialist consults as needed  Outcome: Progressing  Flowsheets (Taken 3/27/2024 0800)  Patient/family able to verbalize anxieties, fears, and concerns, and demonstrate effective coping:   Assist patient/family to identify coping skills, available support systems and cultural and spiritual values   Provide emotional support, including active listening and acknowledgement of concerns of patient and caregivers   Reduce environmental stimuli, as able   Instruct patient/family in relaxation techniques, as appropriate   Assess for spiritual pain/suffering and initiate Spiritual Care, Psychosocial Clinical Specialist consults as needed     Problem: Safety - Adult  Goal: Free from fall injury  Outcome: Progressing     Problem: ABCDS Injury Assessment  Goal: Absence of physical injury  Outcome: Progressing     Problem: Nutrition Deficit:  Goal: Optimize nutritional status  Outcome: Progressing     Problem: Pain  Goal: Verbalizes/displays adequate comfort level or baseline comfort level  Outcome: Progressing  Flowsheets (Taken 3/27/2024 1000)  Verbalizes/displays adequate comfort level or baseline comfort level:   Encourage patient to monitor pain and request assistance   Assess pain using appropriate pain scale   Administer analgesics based on type and severity of pain and evaluate response   Implement non-pharmacological measures as appropriate and evaluate response   Consider cultural and social influences on pain and pain management   Notify Licensed Independent Practitioner if interventions unsuccessful or patient reports new pain     Problem: Neurosensory - Adult  Goal: Achieves stable or improved neurological status  Outcome: Progressing  Flowsheets (Taken 3/27/2024 0800)  Achieves stable or improved neurological status:   Assess for and report changes in

## 2024-03-27 NOTE — PROGRESS NOTES
PT IV RFA infiltrated [facility] pharmacy no reversal agent needed just normal compress as needed.

## 2024-03-28 LAB
ANION GAP SERPL CALCULATED.3IONS-SCNC: 9 MMOL/L (ref 7–16)
BASOPHILS ABSOLUTE: 0 K/CU MM
BASOPHILS RELATIVE PERCENT: 0.1 % (ref 0–1)
BUN SERPL-MCNC: 16 MG/DL (ref 6–23)
CALCIUM SERPL-MCNC: 8.3 MG/DL (ref 8.3–10.6)
CHLORIDE BLD-SCNC: 100 MMOL/L (ref 99–110)
CO2: 30 MMOL/L (ref 21–32)
CREAT SERPL-MCNC: 0.6 MG/DL (ref 0.6–1.1)
DIFFERENTIAL TYPE: ABNORMAL
EOSINOPHILS ABSOLUTE: 0 K/CU MM
EOSINOPHILS RELATIVE PERCENT: 0 % (ref 0–3)
GFR SERPL CREATININE-BSD FRML MDRD: >90 ML/MIN/1.73M2
GLUCOSE SERPL-MCNC: 120 MG/DL (ref 70–99)
HCT VFR BLD CALC: 39.7 % (ref 37–47)
HEMOGLOBIN: 13.1 GM/DL (ref 12.5–16)
IMMATURE NEUTROPHIL %: 0.2 % (ref 0–0.43)
LYMPHOCYTES ABSOLUTE: 0.8 K/CU MM
LYMPHOCYTES RELATIVE PERCENT: 7.4 % (ref 24–44)
MCH RBC QN AUTO: 32.3 PG (ref 27–31)
MCHC RBC AUTO-ENTMCNC: 33 % (ref 32–36)
MCV RBC AUTO: 97.8 FL (ref 78–100)
MONOCYTES ABSOLUTE: 0.5 K/CU MM
MONOCYTES RELATIVE PERCENT: 4.8 % (ref 0–4)
NUCLEATED RBC %: 0 %
PDW BLD-RTO: 12.3 % (ref 11.7–14.9)
PLATELET # BLD: 228 K/CU MM (ref 140–440)
PMV BLD AUTO: 9.7 FL (ref 7.5–11.1)
POTASSIUM SERPL-SCNC: 4.2 MMOL/L (ref 3.5–5.1)
PROCALCITONIN SERPL-MCNC: 0.56 NG/ML
RBC # BLD: 4.06 M/CU MM (ref 4.2–5.4)
SEGMENTED NEUTROPHILS ABSOLUTE COUNT: 8.8 K/CU MM
SEGMENTED NEUTROPHILS RELATIVE PERCENT: 87.5 % (ref 36–66)
SODIUM BLD-SCNC: 139 MMOL/L (ref 135–145)
TOTAL IMMATURE NEUTOROPHIL: 0.02 K/CU MM
TOTAL NUCLEATED RBC: 0 K/CU MM
WBC # BLD: 10.1 K/CU MM (ref 4–10.5)

## 2024-03-28 PROCEDURE — 6370000000 HC RX 637 (ALT 250 FOR IP): Performed by: INTERNAL MEDICINE

## 2024-03-28 PROCEDURE — 1200000000 HC SEMI PRIVATE

## 2024-03-28 PROCEDURE — 2580000003 HC RX 258: Performed by: STUDENT IN AN ORGANIZED HEALTH CARE EDUCATION/TRAINING PROGRAM

## 2024-03-28 PROCEDURE — 6360000002 HC RX W HCPCS: Performed by: STUDENT IN AN ORGANIZED HEALTH CARE EDUCATION/TRAINING PROGRAM

## 2024-03-28 PROCEDURE — 99232 SBSQ HOSP IP/OBS MODERATE 35: CPT | Performed by: INTERNAL MEDICINE

## 2024-03-28 PROCEDURE — 85025 COMPLETE CBC W/AUTO DIFF WBC: CPT

## 2024-03-28 PROCEDURE — 92610 EVALUATE SWALLOWING FUNCTION: CPT

## 2024-03-28 PROCEDURE — 94640 AIRWAY INHALATION TREATMENT: CPT

## 2024-03-28 PROCEDURE — 2580000003 HC RX 258: Performed by: INTERNAL MEDICINE

## 2024-03-28 PROCEDURE — 2700000000 HC OXYGEN THERAPY PER DAY

## 2024-03-28 PROCEDURE — 6360000002 HC RX W HCPCS: Performed by: INTERNAL MEDICINE

## 2024-03-28 PROCEDURE — 94761 N-INVAS EAR/PLS OXIMETRY MLT: CPT

## 2024-03-28 PROCEDURE — 84145 PROCALCITONIN (PCT): CPT

## 2024-03-28 PROCEDURE — 36415 COLL VENOUS BLD VENIPUNCTURE: CPT

## 2024-03-28 PROCEDURE — 6370000000 HC RX 637 (ALT 250 FOR IP): Performed by: STUDENT IN AN ORGANIZED HEALTH CARE EDUCATION/TRAINING PROGRAM

## 2024-03-28 PROCEDURE — 80048 BASIC METABOLIC PNL TOTAL CA: CPT

## 2024-03-28 RX ADMIN — PREDNISONE 40 MG: 10 TABLET ORAL at 11:19

## 2024-03-28 RX ADMIN — SODIUM CHLORIDE, PRESERVATIVE FREE 10 ML: 5 INJECTION INTRAVENOUS at 11:44

## 2024-03-28 RX ADMIN — CEFTRIAXONE 1000 MG: 1 INJECTION, POWDER, FOR SOLUTION INTRAMUSCULAR; INTRAVENOUS at 11:31

## 2024-03-28 RX ADMIN — ACETAMINOPHEN 650 MG: 325 TABLET ORAL at 22:35

## 2024-03-28 RX ADMIN — ROSUVASTATIN CALCIUM 20 MG: 20 TABLET, COATED ORAL at 11:18

## 2024-03-28 RX ADMIN — RANOLAZINE 500 MG: 500 TABLET, EXTENDED RELEASE ORAL at 22:36

## 2024-03-28 RX ADMIN — TIOTROPIUM BROMIDE AND OLODATEROL 2 PUFF: 3.124; 2.736 SPRAY, METERED RESPIRATORY (INHALATION) at 07:12

## 2024-03-28 RX ADMIN — CLOPIDOGREL BISULFATE 75 MG: 75 TABLET ORAL at 11:19

## 2024-03-28 RX ADMIN — AZITHROMYCIN MONOHYDRATE 500 MG: 500 INJECTION, POWDER, LYOPHILIZED, FOR SOLUTION INTRAVENOUS at 11:22

## 2024-03-28 RX ADMIN — IPRATROPIUM BROMIDE AND ALBUTEROL SULFATE 1 DOSE: .5; 2.5 SOLUTION RESPIRATORY (INHALATION) at 07:11

## 2024-03-28 RX ADMIN — SENNOSIDES 8.6 MG: 8.6 TABLET, FILM COATED ORAL at 22:36

## 2024-03-28 RX ADMIN — OSELTAMIVIR PHOSPHATE 30 MG: 75 CAPSULE ORAL at 22:37

## 2024-03-28 RX ADMIN — IPRATROPIUM BROMIDE AND ALBUTEROL SULFATE 1 DOSE: .5; 2.5 SOLUTION RESPIRATORY (INHALATION) at 21:05

## 2024-03-28 RX ADMIN — BUSPIRONE HYDROCHLORIDE 5 MG: 5 TABLET ORAL at 22:36

## 2024-03-28 RX ADMIN — SODIUM CHLORIDE, PRESERVATIVE FREE 10 ML: 5 INJECTION INTRAVENOUS at 22:36

## 2024-03-28 RX ADMIN — OSELTAMIVIR PHOSPHATE 30 MG: 75 CAPSULE ORAL at 11:19

## 2024-03-28 RX ADMIN — ONDANSETRON 4 MG: 4 TABLET, ORALLY DISINTEGRATING ORAL at 12:26

## 2024-03-28 RX ADMIN — BUSPIRONE HYDROCHLORIDE 5 MG: 5 TABLET ORAL at 11:20

## 2024-03-28 RX ADMIN — GABAPENTIN 600 MG: 300 CAPSULE ORAL at 22:36

## 2024-03-28 RX ADMIN — RANOLAZINE 500 MG: 500 TABLET, EXTENDED RELEASE ORAL at 11:18

## 2024-03-28 RX ADMIN — BENZONATATE 100 MG: 100 CAPSULE ORAL at 11:19

## 2024-03-28 RX ADMIN — ENOXAPARIN SODIUM 30 MG: 100 INJECTION SUBCUTANEOUS at 11:19

## 2024-03-28 ASSESSMENT — PAIN SCALES - WONG BAKER
WONGBAKER_NUMERICALRESPONSE: NO HURT
WONGBAKER_NUMERICALRESPONSE: NO HURT

## 2024-03-28 ASSESSMENT — PAIN DESCRIPTION - ORIENTATION
ORIENTATION: RIGHT
ORIENTATION: UPPER

## 2024-03-28 ASSESSMENT — PAIN DESCRIPTION - LOCATION
LOCATION: SHOULDER
LOCATION: HEAD

## 2024-03-28 ASSESSMENT — PAIN DESCRIPTION - FREQUENCY: FREQUENCY: INTERMITTENT

## 2024-03-28 ASSESSMENT — PAIN - FUNCTIONAL ASSESSMENT: PAIN_FUNCTIONAL_ASSESSMENT: PREVENTS OR INTERFERES SOME ACTIVE ACTIVITIES AND ADLS

## 2024-03-28 ASSESSMENT — PAIN DESCRIPTION - PAIN TYPE: TYPE: ACUTE PAIN

## 2024-03-28 ASSESSMENT — PAIN DESCRIPTION - DESCRIPTORS
DESCRIPTORS: SHARP;SHOOTING;SPASM
DESCRIPTORS: ACHING

## 2024-03-28 ASSESSMENT — PAIN SCALES - GENERAL
PAINLEVEL_OUTOF10: 7
PAINLEVEL_OUTOF10: 8

## 2024-03-28 ASSESSMENT — PAIN DESCRIPTION - ONSET: ONSET: ON-GOING

## 2024-03-28 NOTE — PROGRESS NOTES
V2.0    [facility] [place]      Name:  Kate Minor /Age/Sex: 1960  (63 y.o. female)   MRN & CSN:  [id number] & [id number] Encounter Date/Time: 3/28/2024 7:52 AM EDT   Location:  [address] [place] [address]-A PCP: Sharyn Delatorre, APRN - CNP     Attending:Brigitte Forman MD       Hospital Day: 5    Assessment and Recommendations   Kate Minor is a 63 y.o. female with pmh of COPD, hypertension, Salas's esophagus, protein calorie malnutrition  who presents with Acute hypoxemic respiratory failure (HCC)    # Dysphagia: Had difficulty swallowing on 3/27, kept n.p.o., consulted SLP and GI.  Evaluated by GI and recommended EGD today or tomorrow, continue IV PPI.  Continue to monitor.    # Sepsis and acute  Respiratory Failure with hypoxia and hypercapnia Secondary to Flu/COPD exacerbation/CAP: Presented with shortness of breath and sore throat, SIRS 4/4 with tachycardia, tachypnea, temperature, elevated WBC lactic acid normal, VBG showed pCO2 55-59, requiring BiPAP, respiratory panel influenza positive, started on ceftriaxone, azithromycin, oseltamivir, obtain blood cultures no growth till date,  procalcitonin 1.93, wean off O2 as tolerated.,  Continue inhalers, continue steroids,     # Influenza Positive: Started on Tamiflu and cough suppressants, throat lonzenge.    # COPD Exacerbation: Continue scheduled bronchodilators and steroid therapy, continuous pulse ox    # History of Anxiety: Continue BuSpar    # Hx CAD: Continue Plavix and Statin    # Hx GERD/Salas's Esophagus:  Continue PPI    # Hypertension: On amlodipine 5 mg daily and Lopressor 25 mg daily, hold due to hypotension    # Hyperlipidemia, continue statin    # Severe malnutrition suspected protein calorie: BMI 15.53    # Active tobacco use: Continue encourage for cessation    Comment: Please note this report has been produced using speech recognition software and may contain errors related to that system including errors in grammar, punctuation,  and spelling, as well as words and phrases that may be inappropriate. If there are any questions or concerns please feel free to contact the dictating provider for clarification.       Diet Diet NPO   DVT Prophylaxis [x] Lovenox, []  Heparin, [] SCDs, [] Ambulation,  [] Eliquis, [] Xarelto  [] Coumadin   Code Status DNR-CCA   Disposition From: Home  Expected Disposition: Swing bed  Estimated Date of Discharge: TBD  Patient requires continued admission due to acute respiratory failure and dysphagia   Surrogate Decision Maker/ POA Son     Personally reviewed Lab Studies and Imaging     Imaging that was interpreted personally includes chest x-ray and results no acute cardiopulmonary disease    Drugs that require monitoring for toxicity include Lovenox and the method of monitoring was CBC        Subjective:     Chief Complaint: Shortness of breath      Patient seen and examined at bedside.  Patient states he has difficulty swallowing yesterday and could not swallow and is stated she felt like she is dying, still have short of breath and cough.  Abdominal distention went down today.    Review of Systems:      Pertinent positives and negatives discussed in HPI    Objective:     Intake/Output Summary (Last 24 hours) at 3/28/2024 0810  Last data filed at 3/28/2024 0508  Gross per 24 hour   Intake --   Output 700 ml   Net -700 ml      Vitals:   Vitals:    03/27/24 2100 03/27/24 2230 03/28/24 0212 03/28/24 0415   BP: (!) 186/96  (!) 182/74 (!) 166/78   Pulse: 87 80 72 78   Resp: 18 16 16 15   Temp:   97.8 °F (36.6 °C) 97.9 °F (36.6 °C)   TempSrc:   Oral Oral   SpO2: 97% 99% 98% 99%   Weight:       Height:             Physical Exam:      General: Afebrile, in distress  Eyes: EOMI  ENT: neck supple, no JVD  Cardiovascular: S1-S2 normal no murmur  Respiratory: Air entry good bilaterally bilateral expiratory wheeze heard  Gastrointestinal: Soft, nontender  bowel sounds normal  Genitourinary: no suprapubic

## 2024-03-28 NOTE — PLAN OF CARE
Problem: Discharge Planning  Goal: Discharge to home or other facility with appropriate resources  Outcome: Progressing     Problem: Respiratory - Adult  Goal: Achieves optimal ventilation and oxygenation  Outcome: Progressing     Problem: Infection - Adult  Goal: Absence of infection at discharge  Outcome: Progressing  Goal: Absence of infection during hospitalization  Outcome: Progressing  Goal: Absence of fever/infection during anticipated neutropenic period  Outcome: Progressing     Problem: Anxiety  Goal: Will report anxiety at manageable levels  Description: INTERVENTIONS:  1. Administer medication as ordered  2. Teach and rehearse alternative coping skills  3. Provide emotional support with 1:1 interaction with staff  Outcome: Progressing     Problem: Coping  Goal: Pt/Family able to verbalize concerns and demonstrate effective coping strategies  Description: INTERVENTIONS:  1. Assist patient/family to identify coping skills, available support systems and cultural and spiritual values  2. Provide emotional support, including active listening and acknowledgement of concerns of patient and caregivers  3. Reduce environmental stimuli, as able  4. Instruct patient/family in relaxation techniques, as appropriate  5. Assess for spiritual pain/suffering and initiate Spiritual Care, Psychosocial Clinical Specialist consults as needed  Outcome: Progressing     Problem: Safety - Adult  Goal: Free from fall injury  Outcome: Progressing     Problem: ABCDS Injury Assessment  Goal: Absence of physical injury  Outcome: Progressing     Problem: Nutrition Deficit:  Goal: Optimize nutritional status  Outcome: Progressing     Problem: Pain  Goal: Verbalizes/displays adequate comfort level or baseline comfort level  Outcome: Progressing     Problem: Neurosensory - Adult  Goal: Achieves stable or improved neurological status  Outcome: Progressing  Goal: Achieves maximal functionality and self care  Outcome: Progressing    75 75

## 2024-03-28 NOTE — PROGRESS NOTES
Pulmonary and Critical Care  Progress Note      VITALS:  BP (!) 148/81   Pulse 99   Temp 98.2 °F (36.8 °C) (Oral)   Resp 19   Ht 1.473 m (4' 10\")   Wt 35.6 kg (78 lb 7.7 oz)   SpO2 99%   BMI 16.40 kg/m²     Subjective:   CHIEF COMPLAINT :SOB     HPI:                The patient is a 63 y.o. female is lying in the bed.She is in mild resp distress    Objective:   PHYSICAL EXAM:    LUNGS:Occasional exp wheeze  Abd-soft,BS+,NT  Ext- no pedal edema  CVS-s1s2, no murmurs      DATA:    CBC:  Recent Labs     03/26/24  0218 03/27/24 0202 03/28/24 0223   WBC 12.8* 13.8* 10.1   RBC 4.01* 3.78* 4.06*   HGB 12.9 12.2* 13.1   HCT 40.4 36.9* 39.7    243 228   .7* 97.6 97.8   MCH 32.2* 32.3* 32.3*   MCHC 31.9* 33.1 33.0   RDW 12.8 12.5 12.3   SEGSPCT 91.6* 90.7* 87.5*      BMP:  Recent Labs     03/27/24 0202 03/28/24 0223    139   K 4.3 4.2   CL 99 100   CO2 30 30   BUN 26* 16   CREATININE 0.6 0.6   CALCIUM 8.1* 8.3   GLUCOSE 214* 120*      ABG:  No results for input(s): \"PH\", \"PO2ART\", \"MIK5XIR\", \"HCO3\", \"BEART\", \"O2SAT\" in the last 72 hours.  BNP  No results found for: \"BNP\"   D-Dimer:  No results found for: \"DDIMER\"   Radiology: None      Assessment/Plan     Patient Active Problem List    Diagnosis Date Noted    Acute hypoxemic respiratory failure (HCC) 03/24/2024    NSTEMI (non-ST elevated myocardial infarction) ([facility]) 03/16/2024    COPD exacerbation ([facility]) 02/05/2024    Severe malnutrition ([facility]) 01/24/2024     Class: Chronic    Chronic obstructive pulmonary disease with acute exacerbation ([facility]) 01/23/2024    Coronary artery disease involving native coronary artery of native heart with angina pectoris (HCC) 01/23/2024     Overview Note:     Coronary anatomy:   The left main coronary artery has no significant disease.      Left anterior descending artery has no significant disease.     Ramus has 60-70% proximal segment stenosis     Circumflex artery has no significant disease 60-70% distal segment

## 2024-03-28 NOTE — CARE COORDINATION
Chart reviewed and patient discussed in IDR. No new needs identified. CM available should any new needs arise.

## 2024-03-28 NOTE — PROGRESS NOTES
4 Eyes Skin Assessment     NAME:  Kate Minor  YOB: 1960  MEDICAL RECORD NUMBER:  [id number]    The patient is being assessed for  Admission    I agree that at least one RN has performed a thorough Head to Toe Skin Assessment on the patient. ALL assessment sites listed below have been assessed.      Areas assessed by both nurses:    Head, Face, Ears, Shoulders, Back, Chest, Arms, Elbows, Hands, Sacrum. Buttock, Coccyx, Ischium, Legs. Feet and Heels, and Under Medical Devices         Does the Patient have a Wound? No noted wound(s)       Navid Prevention initiated by RN: No  Wound Care Orders initiated by RN: No    Pressure Injury (Stage 3,4, Unstageable, DTI, NWPT, and Complex wounds) if present, place Wound referral order by RN under : No    New Ostomies, if present place, Ostomy referral order under : No     Nurse 1 eSignature: Electronically signed by Silvana Roger RN on 3/27/24 at 11:29 PM EDT    **SHARE this note so that the co-signing nurse can place an eSignature**    Nurse 2 eSignature: {Esignature:[id number]}

## 2024-03-28 NOTE — PROGRESS NOTES
sticking on left side of throat, \"vomiting\" foods    Pain:  Pain Assessment  Pain Assessment: None - Denies Pain  Pain Level: 6  Puentes-Baker Pain Rating: No hurt  Patient's Stated Pain Goal: 0 - No pain  Pain Location: Throat  Pain Orientation: Mid  Pain Descriptors: Aching  Functional Pain Assessment: Activities are not prevented  Pain Type: Acute pain  Pain Radiating Towards: n/a  Pain Frequency: Intermittent  Pain Onset: Other (Comment)  Non-Pharmaceutical Pain Intervention(s): Rest  Response to Pain Intervention: Pain improved but above pain goal  Side Effects: No reported side effects  Multiple Pain Sites: No    Reason for Referral  Kate Minor was referred for a bedside swallow evaluation to assess the efficiency of her swallow function, identify signs and symptoms of aspiration and make recommendations regarding safe dietary consistencies, effective compensatory strategies, and safe eating environment.    Impression  Dysphagia Diagnosis: Concerns for esophageal stage dysphagia;Swallow function appears WF  Dysphagia Outcome Severity Scale: Level 6: Within functional limits/Modified independence     Treatment Plan  Requires SLP Intervention: No  Duration of Treatment: N/A          Recommended Diet and Intervention  Diet Solids Recommendation: Easy to Chew  Liquid Consistency Recommendation: Thin  Recommended Form of Meds: PO          Compensatory Swallowing Strategies  Compensatory Swallowing Strategies : Upright as possible for all oral intake;Eat/Feed slowly;Remain upright for 30-45 minutes after meals;Small bites/sips;Alternate solids and liquids    Treatment/Goals  Short-term Goals  Timeframe for Short-term Goals: N/A    General  Chart Reviewed: Yes  Behavior/Cognition: Alert;Cooperative  Respiratory Status: O2 via nasual cannula  O2 Device: Nasal cannula  Communication Observation: Functional  Follows Directions: Simple  Dentition: Dentures top;Edentulous  Patient Positioning: Upright in bed  Baseline  Vocal Quality: Normal  Prior Dysphagia History: reports some history of difficulty swallowing solids 2/2 Salas's esophagus  Consistencies Administered: Regular;Pureed;Thin - straw;Thin - cup    Vision/Hearing       Oral Motor Deficits  Labial: No impairment  Dentition: Upper dentures  Lingual: No impairment  Velum: No Impairment  Mandible: No impairment  Consistencies Administered: Regular;Pureed;Thin - straw;Thin - cup    Oral Phase Dysfunction  Oral Phase  Oral Phase: WFL     Indicators of Pharyngeal Phase Dysfunction   Pharyngeal Phase  Pharyngeal Phase: WFL  Pharyngeal Phase   Pharyngeal Phase: WFL    Prognosis  Consulted and agree with results and recommendations: Patient;RN    Education  Patient Education: results, recommendations  Patient Education Response: Verbalizes understanding  Safety Devices in place: Yes  Type of devices: All fall risk precautions in place       Therapy Time  SLP Individual Minutes  Time In: 0910  Time Out: 0930  Minutes: 20            KRISTA Campbell  3/28/2024 10:31 AM

## 2024-03-28 NOTE — PROGRESS NOTES
4 Eyes Skin Assessment     NAME:  Kate Minor  YOB: 1960  MEDICAL RECORD NUMBER:  [id number]    The patient is being assessed for    Admission    I agree that at least one RN has performed a thorough Head to Toe Skin Assessment on the patient. ALL assessment sites listed below have been assessed.      Areas assessed by both nurses:            Does the Patient have a Wound? No noted wound(s)       Navid Prevention initiated by RN: No  Wound Care Orders initiated by RN: No    Pressure Injury (Stage 3,4, Unstageable, DTI, NWPT, and Complex wounds) if present, place Wound referral order by RN under : No    New Ostomies, if present place, Ostomy referral order under : No     Nurse 1 eSignature: Electronically signed by Silvana Roger RN on 3/27/24 at 11:27 PM EDT    **SHARE this note so that the co-signing nurse can place an eSignature**    Nurse 2 eSignature: {Esignature:[id number]}

## 2024-03-28 NOTE — CONSULTS
Admitted with shortness of breath COPD exacerbation  Reports GERD reports Salas's  Reports new onset dysphagia  Swallow eval today nondiagnostic  May have esophageal candidiasis  Advance diet as tolerated  Will keep n.p.o. after midnight  EGD a.m. abdomen soft    I saw and evaluated the patient myself.  I personally reviewed the chart, labs, and imaging studies and provided a substantive portion of the care for this patient.  I personally performed all aspects of medical decision making for this encounter. I have spoken with the patient, nursing staff and provided them with appropriate verbal and written instructions.      I have reviewed and verified this documentation done by TANI ROBERTS and it accurately reflects our care and I have added a separate note to reflect my clinical impression and suggestions.    [facility]    Please note that time of note may not reflect the time of encounter   [facility]  Gastroenterology Consultation    3/28/2024  8:23 AM  _________________________________________________________________________  Patient:    Kate Minor  : 1960   63 y.o.             MRN: [id number]  Admitted: 3/24/2024 10:03 AM ATT: Brigitte Forman MD   [id number]/[id number]-A  AdmitDx: Acute hypoxemic respiratory failure (HCC) [J96.01]  COPD exacerbation (HCC) [J44.1] PCP: Sharyn Delatorre, KJ - [facility]  _________________________________________________________________________    Reason for Consult: Dysphagia  Requesting Physician:  Brigitte Forman MD    _________________________________________________________________________  IMPRESSION and RECOMMENDATIONS:    The patient is a 63 y.o. female with hx per HPI. GI consulted for dysphagia.     Impression:   Dysphagia  COPD  Influenza A  Hx of CAD , on Plavix   Constipation    Discussion:  Patient with progressively worsening difficult swallowing over the past few days. Uses steroids an steroid  patch onto the skin daily for 14 days 2/2/24 2/16/24  Doc Rubio MD   clopidogrel (PLAVIX) 75 MG tablet Take 1 tablet by mouth daily 1/25/24   Grisel Neely MD   tiotropium-olodaterol (STIOLTO RESPIMAT) 2.5-2.5 MCG/ACT AERS Inhale 2 puffs into the lungs daily 1/25/24   Grisel Neely MD   albuterol (PROVENTIL) (2.5 MG/3ML) 0.083% nebulizer solution Take 3 mLs by nebulization every 6 hours as needed for Wheezing 1/25/24   Grisel Neely MD   omeprazole (PRILOSEC) 40 MG delayed release capsule Take 1 capsule by mouth daily    ProviderIndu MD   rosuvastatin (CRESTOR) 20 MG tablet Take 1 tablet by mouth daily    ProviderIndu MD   albuterol sulfate HFA (VENTOLIN HFA) 108 (90 BASE) MCG/ACT inhaler Inhale 2 puffs into the lungs every 6 hours as needed for Wheezing 3/16/17   Ebony Tijerina, APRN - CNP   gabapentin (NEURONTIN) 600 MG tablet Take 1 tablet by mouth daily 2/22/17   Ebony Tijerina, APRN - CNP        Scheduled Medications:    senna  1 tablet Oral Nightly    polyethylene glycol  17 g Oral Daily    gabapentin  600 mg Oral Once    lidocaine  1 patch TransDERmal Daily    lidocaine  1 patch TransDERmal Daily    nicotine  1 patch TransDERmal Daily    cefTRIAXone (ROCEPHIN) IV  1,000 mg IntraVENous Q24H    gabapentin  600 mg Oral Nightly    sodium chloride flush  5-40 mL IntraVENous 2 times per day    ipratropium 0.5 mg-albuterol 2.5 mg  1 Dose Inhalation Q4H WA RT    predniSONE  40 mg Oral Daily    [Held by provider] amLODIPine  5 mg Oral Daily    clopidogrel  75 mg Oral Daily    pantoprazole  40 mg Oral QAM AC    ranolazine  500 mg Oral BID    rosuvastatin  20 mg Oral Daily    azithromycin  500 mg IntraVENous Q24H    [Held by provider] metoprolol tartrate  25 mg Oral BID    enoxaparin  30 mg SubCUTAneous Daily    tiotropium-olodaterol  2 puff Inhalation Daily    busPIRone  5 mg Oral BID    oseltamivir  30 mg Oral BID     Infusions:    sodium

## 2024-03-28 NOTE — PROGRESS NOTES
03/28/24 1453   Encounter Summary   Encounter Overview/Reason  Initial Encounter   Service Provided For: Patient   Referral/Consult From: [facility]   Support System Family members   Last Encounter  03/28/24  (PT resting, did not want to be disturbed, has the flu, coping today, continue to provide support as needed.)   Complexity of Encounter Low   Begin Time 1451   End Time  1455   Total Time Calculated 4 min   Assessment/Intervention/Outcome   Assessment Unable to assess   Plan and Referrals   Plan/Referrals Continue Support (comment)

## 2024-03-28 NOTE — PROGRESS NOTES
4 Eyes Skin Assessment     NAME:  Kate Minor  YOB: 1960  MEDICAL RECORD NUMBER:  [id number]    The patient is being assessed for  Admission    I agree that at least one RN has performed a thorough Head to Toe Skin Assessment on the patient. ALL assessment sites listed below have been assessed.      Areas assessed by both nurses:    Head, Face, Ears, Shoulders, Back, Chest, Arms, Elbows, Hands, Sacrum. Buttock, Coccyx, Ischium, Legs. Feet and Heels, Under Medical Devices , and Other          Does the Patient have a Wound? No noted wound(s) scattered bruising over body and arms        Navid Prevention initiated by RN: No  Wound Care Orders initiated by RN: No    Pressure Injury (Stage 3,4, Unstageable, DTI, NWPT, and Complex wounds) if present, place Wound referral order by RN under : No    New Ostomies, if present place, Ostomy referral order under : No     Nurse 1 eSignature: Electronically signed by Mónica Tijerina RN on 3/27/24 at 11:12 PM EDT    **SHARE this note so that the co-signing nurse can place an eSignature**    Nurse 2 eSignature: {Esignature:[id number]}

## 2024-03-29 ENCOUNTER — ANESTHESIA EVENT (OUTPATIENT)
Dept: ENDOSCOPY | Age: 64
End: 2024-03-29

## 2024-03-29 LAB
ANION GAP SERPL CALCULATED.3IONS-SCNC: 11 MMOL/L (ref 7–16)
BUN SERPL-MCNC: 29 MG/DL (ref 6–23)
CALCIUM SERPL-MCNC: 9 MG/DL (ref 8.3–10.6)
CHLORIDE BLD-SCNC: 98 MMOL/L (ref 99–110)
CO2: 30 MMOL/L (ref 21–32)
CREAT SERPL-MCNC: 0.6 MG/DL (ref 0.6–1.1)
CULTURE: NORMAL
CULTURE: NORMAL
GFR SERPL CREATININE-BSD FRML MDRD: >90 ML/MIN/1.73M2
GLUCOSE SERPL-MCNC: 61 MG/DL (ref 70–99)
Lab: NORMAL
Lab: NORMAL
POTASSIUM SERPL-SCNC: 4.7 MMOL/L (ref 3.5–5.1)
SODIUM BLD-SCNC: 139 MMOL/L (ref 135–145)
SPECIMEN: NORMAL
SPECIMEN: NORMAL

## 2024-03-29 PROCEDURE — 6360000002 HC RX W HCPCS: Performed by: STUDENT IN AN ORGANIZED HEALTH CARE EDUCATION/TRAINING PROGRAM

## 2024-03-29 PROCEDURE — 6370000000 HC RX 637 (ALT 250 FOR IP): Performed by: INTERNAL MEDICINE

## 2024-03-29 PROCEDURE — 6370000000 HC RX 637 (ALT 250 FOR IP): Performed by: STUDENT IN AN ORGANIZED HEALTH CARE EDUCATION/TRAINING PROGRAM

## 2024-03-29 PROCEDURE — 2580000003 HC RX 258: Performed by: STUDENT IN AN ORGANIZED HEALTH CARE EDUCATION/TRAINING PROGRAM

## 2024-03-29 PROCEDURE — 1200000000 HC SEMI PRIVATE

## 2024-03-29 PROCEDURE — 94761 N-INVAS EAR/PLS OXIMETRY MLT: CPT

## 2024-03-29 PROCEDURE — 94640 AIRWAY INHALATION TREATMENT: CPT

## 2024-03-29 PROCEDURE — 99232 SBSQ HOSP IP/OBS MODERATE 35: CPT | Performed by: INTERNAL MEDICINE

## 2024-03-29 PROCEDURE — 36415 COLL VENOUS BLD VENIPUNCTURE: CPT

## 2024-03-29 PROCEDURE — 6360000002 HC RX W HCPCS: Performed by: INTERNAL MEDICINE

## 2024-03-29 PROCEDURE — 2580000003 HC RX 258: Performed by: INTERNAL MEDICINE

## 2024-03-29 PROCEDURE — 2700000000 HC OXYGEN THERAPY PER DAY

## 2024-03-29 PROCEDURE — 80048 BASIC METABOLIC PNL TOTAL CA: CPT

## 2024-03-29 RX ORDER — AMLODIPINE BESYLATE 5 MG/1
5 TABLET ORAL DAILY
Status: DISCONTINUED | OUTPATIENT
Start: 2024-03-29 | End: 2024-04-05 | Stop reason: HOSPADM

## 2024-03-29 RX ORDER — HYDRALAZINE HYDROCHLORIDE 20 MG/ML
10 INJECTION INTRAMUSCULAR; INTRAVENOUS EVERY 4 HOURS PRN
Status: DISCONTINUED | OUTPATIENT
Start: 2024-03-29 | End: 2024-04-05 | Stop reason: HOSPADM

## 2024-03-29 RX ORDER — LABETALOL HYDROCHLORIDE 5 MG/ML
10 INJECTION, SOLUTION INTRAVENOUS EVERY 4 HOURS PRN
Status: DISCONTINUED | OUTPATIENT
Start: 2024-03-29 | End: 2024-04-05 | Stop reason: HOSPADM

## 2024-03-29 RX ADMIN — GABAPENTIN 600 MG: 300 CAPSULE ORAL at 23:09

## 2024-03-29 RX ADMIN — ONDANSETRON 4 MG: 4 TABLET, ORALLY DISINTEGRATING ORAL at 00:07

## 2024-03-29 RX ADMIN — SODIUM CHLORIDE, PRESERVATIVE FREE 10 ML: 5 INJECTION INTRAVENOUS at 10:03

## 2024-03-29 RX ADMIN — ENOXAPARIN SODIUM 30 MG: 100 INJECTION SUBCUTANEOUS at 10:01

## 2024-03-29 RX ADMIN — SENNOSIDES 8.6 MG: 8.6 TABLET, FILM COATED ORAL at 21:19

## 2024-03-29 RX ADMIN — METOPROLOL TARTRATE 25 MG: 25 TABLET, FILM COATED ORAL at 12:36

## 2024-03-29 RX ADMIN — TIOTROPIUM BROMIDE AND OLODATEROL 2 PUFF: 3.124; 2.736 SPRAY, METERED RESPIRATORY (INHALATION) at 08:28

## 2024-03-29 RX ADMIN — POLYETHYLENE GLYCOL 3350 17 G: 17 POWDER, FOR SOLUTION ORAL at 10:00

## 2024-03-29 RX ADMIN — METOPROLOL TARTRATE 25 MG: 25 TABLET, FILM COATED ORAL at 21:18

## 2024-03-29 RX ADMIN — AZITHROMYCIN MONOHYDRATE 500 MG: 500 INJECTION, POWDER, LYOPHILIZED, FOR SOLUTION INTRAVENOUS at 12:32

## 2024-03-29 RX ADMIN — RANOLAZINE 500 MG: 500 TABLET, EXTENDED RELEASE ORAL at 21:18

## 2024-03-29 RX ADMIN — IPRATROPIUM BROMIDE AND ALBUTEROL SULFATE 1 DOSE: .5; 2.5 SOLUTION RESPIRATORY (INHALATION) at 20:05

## 2024-03-29 RX ADMIN — AMLODIPINE BESYLATE 5 MG: 5 TABLET ORAL at 12:33

## 2024-03-29 RX ADMIN — BUSPIRONE HYDROCHLORIDE 5 MG: 5 TABLET ORAL at 10:00

## 2024-03-29 RX ADMIN — SODIUM CHLORIDE, PRESERVATIVE FREE 10 ML: 5 INJECTION INTRAVENOUS at 21:19

## 2024-03-29 RX ADMIN — IPRATROPIUM BROMIDE AND ALBUTEROL SULFATE 1 DOSE: .5; 2.5 SOLUTION RESPIRATORY (INHALATION) at 11:55

## 2024-03-29 RX ADMIN — SODIUM CHLORIDE: 9 INJECTION, SOLUTION INTRAVENOUS at 12:28

## 2024-03-29 RX ADMIN — ROSUVASTATIN CALCIUM 20 MG: 20 TABLET, COATED ORAL at 10:00

## 2024-03-29 RX ADMIN — ONDANSETRON 4 MG: 4 TABLET, ORALLY DISINTEGRATING ORAL at 21:18

## 2024-03-29 RX ADMIN — CEFTRIAXONE 1000 MG: 1 INJECTION, POWDER, FOR SOLUTION INTRAMUSCULAR; INTRAVENOUS at 12:29

## 2024-03-29 RX ADMIN — RANOLAZINE 500 MG: 500 TABLET, EXTENDED RELEASE ORAL at 10:00

## 2024-03-29 RX ADMIN — IPRATROPIUM BROMIDE AND ALBUTEROL SULFATE 1 DOSE: .5; 2.5 SOLUTION RESPIRATORY (INHALATION) at 08:27

## 2024-03-29 RX ADMIN — BUSPIRONE HYDROCHLORIDE 5 MG: 5 TABLET ORAL at 21:19

## 2024-03-29 RX ADMIN — IPRATROPIUM BROMIDE AND ALBUTEROL SULFATE 1 DOSE: .5; 2.5 SOLUTION RESPIRATORY (INHALATION) at 16:03

## 2024-03-29 ASSESSMENT — PAIN SCALES - GENERAL: PAINLEVEL_OUTOF10: 0

## 2024-03-29 NOTE — CARE COORDINATION
This RN case manager spoke with patient regarding therapy recs . She wants to speak wit her son tonight and verify that he still good with her coming home and not to SNF. Adding patient to the weekend DC list for CM to follow up with in the am. As of right now, patient's plan remains home.

## 2024-03-29 NOTE — PLAN OF CARE
Problem: Discharge Planning  Goal: Discharge to home or other facility with appropriate resources  Outcome: Progressing     Problem: Respiratory - Adult  Goal: Achieves optimal ventilation and oxygenation  Outcome: Progressing     Problem: Infection - Adult  Goal: Absence of infection at discharge  Outcome: Progressing

## 2024-03-29 NOTE — PROGRESS NOTES
Pulmonary and Critical Care  Progress Note      VITALS:  BP (!) 169/62   Pulse 80   Temp 97.6 °F (36.4 °C) (Oral)   Resp 16   Ht 1.473 m (4' 10\")   Wt 35.6 kg (78 lb 7.7 oz)   SpO2 96%   BMI 16.40 kg/m²     Subjective:   CHIEF COMPLAINT :SOB     HPI:                The patient is a 63 y.o. female is sitting in the bed. She is in mild resp distress    Objective:   PHYSICAL EXAM:    LUNGS:Occasional basal crackles  Abd-soft,BS+,NT  Ext- no pedal edema  CVS-s1s2, no murmurs      DATA:    CBC:  Recent Labs     03/27/24  0202 03/28/24 0223   WBC 13.8* 10.1   RBC 3.78* 4.06*   HGB 12.2* 13.1   HCT 36.9* 39.7    228   MCV 97.6 97.8   MCH 32.3* 32.3*   MCHC 33.1 33.0   RDW 12.5 12.3   SEGSPCT 90.7* 87.5*      BMP:  Recent Labs     03/27/24  0202 03/28/24 0223    139   K 4.3 4.2   CL 99 100   CO2 30 30   BUN 26* 16   CREATININE 0.6 0.6   CALCIUM 8.1* 8.3   GLUCOSE 214* 120*      ABG:  No results for input(s): \"PH\", \"PO2ART\", \"VMI3EGK\", \"HCO3\", \"BEART\", \"O2SAT\" in the last 72 hours.  BNP  No results found for: \"BNP\"   D-Dimer:  No results found for: \"DDIMER\"   Radiology: None      Assessment/Plan     Patient Active Problem List    Diagnosis Date Noted    Acute hypoxemic respiratory failure ([facility]) 03/24/2024    NSTEMI (non-ST elevated myocardial infarction) ([facility]) 03/16/2024    COPD exacerbation ([facility]) 02/05/2024    Severe malnutrition ([facility]) 01/24/2024     Class: Chronic    Chronic obstructive pulmonary disease with acute exacerbation ([facility]) 01/23/2024    Coronary artery disease involving native coronary artery of native heart with angina pectoris ([facility]) 01/23/2024     Overview Note:     Coronary anatomy:   The left main coronary artery has no significant disease.      Left anterior descending artery has no significant disease.     Ramus has 60-70% proximal segment stenosis     Circumflex artery has no significant disease 60-70% distal segment stenosis     The right coronary artery is a non-dominant vessel

## 2024-03-29 NOTE — PROGRESS NOTES
V2.0    [facility] Progress Note      Name:  Kate Minor /Age/Sex: 1960  (63 y.o. female)   MRN & CSN:  [id number] & [id number] Encounter Date/Time: 3/29/2024 7:52 AM EDT   Location:  [address] [place], [place] [address] PCP: Sharyn Delatorre APRN - CNP     Attending:Venus Marmolejo MD       Hospital Day: 6    Assessment and Recommendations   Kate Minor is a 63 y.o. female with pmh of COPD, hypertension, Salas's esophagus, protein calorie malnutrition  who presents with Acute hypoxemic respiratory failure (HCC)    # Dysphagia: Had difficulty swallowing on 3/27, kept n.p.o., consulted SLP and GI.  Evaluated by GI and recommended EGD tomorrow, continue IV PPI.  Continue to monitor.  -Patient reports that swallowing is relatively improved today  -GI team had to reschedule EGD for tomorrow  -Clear liquid diet today  -N.p.o. from midnight    # Sepsis and acute  Respiratory Failure with hypoxia and hypercapnia Secondary to Flu/COPD exacerbation/CAP: Presented with shortness of breath and sore throat, SIRS 4/4 with tachycardia, tachypnea, temperature, elevated WBC lactic acid normal, VBG showed pCO2 55-59, requiring BiPAP, respiratory panel influenza positive, started on ceftriaxone, azithromycin, oseltamivir, obtain blood cultures no growth till date,  procalcitonin 1.93, wean off O2 as tolerated.,  Continue inhalers, continue steroids,     # Influenza Positive: Started on Tamiflu and cough suppressants, throat lonzenge.    # COPD Exacerbation: Continue scheduled bronchodilators and steroid therapy, continuous pulse ox  # Acute hypoxic respiratory failure 2/2 above: Wean oxygen as able.  Target saturation 90-94% and not more.    # History of Anxiety: Continue BuSpar    # Hx CAD: Continue Plavix and Statin    # Hx GERD/Salas's Esophagus:  Continue PPI    # Hypertension: On amlodipine 5 mg daily and Lopressor 25 mg daily, resumed    # Hyperlipidemia, continue statin    # Severe malnutrition suspected protein calorie: BMI      General: Afebrile, in distress  Eyes: EOMI  ENT: neck supple, no JVD  Cardiovascular: S1-S2 normal no murmur  Respiratory: Air entry good bilaterally bilateral expiratory wheeze heard  Gastrointestinal: Soft, nontender  bowel sounds normal  Genitourinary: no suprapubic tenderness  Musculoskeletal: No edema  Skin: warm, dry  Neuro: Alert.  Oriented no focal deficit  Psych: Mood appropriate.         Medications:   Medications:    amLODIPine  5 mg Oral Daily    metoprolol tartrate  25 mg Oral BID    senna  1 tablet Oral Nightly    polyethylene glycol  17 g Oral Daily    gabapentin  600 mg Oral Once    lidocaine  1 patch TransDERmal Daily    lidocaine  1 patch TransDERmal Daily    nicotine  1 patch TransDERmal Daily    cefTRIAXone (ROCEPHIN) IV  1,000 mg IntraVENous Q24H    gabapentin  600 mg Oral Nightly    sodium chloride flush  5-40 mL IntraVENous 2 times per day    ipratropium 0.5 mg-albuterol 2.5 mg  1 Dose Inhalation Q4H WA RT    [Held by provider] clopidogrel  75 mg Oral Daily    pantoprazole  40 mg Oral QAM AC    ranolazine  500 mg Oral BID    rosuvastatin  20 mg Oral Daily    azithromycin  500 mg IntraVENous Q24H    enoxaparin  30 mg SubCUTAneous Daily    tiotropium-olodaterol  2 puff Inhalation Daily    busPIRone  5 mg Oral BID      Infusions:    sodium chloride 25 mL/hr at 03/29/24 1228     PRN Meds: hydrALAZINE, 10 mg, Q4H PRN  labetalol, 10 mg, Q4H PRN  sodium chloride flush, 5-40 mL, PRN  sodium chloride, , PRN  ondansetron, 4 mg, Q8H PRN   Or  ondansetron, 4 mg, Q6H PRN  acetaminophen, 650 mg, Q6H PRN   Or  acetaminophen, 650 mg, Q6H PRN  benzonatate, 100 mg, TID PRN  albuterol sulfate HFA, 2 puff, Q6H PRN  Benzocaine-Menthol, 1 lozenge, Q2H PRN        Labs and Imaging   XR CHEST PORTABLE    Result Date: 3/24/2024  History: Shortness of breath. COMPARISON: 3/16/2024. FINDINGS: Portable AP view of the chest was obtained. Lungs are clear. Cardiomediastinal contours are normal. No pleural effusion or

## 2024-03-29 NOTE — PROGRESS NOTES
EGD rescheduled today. Plan for EGD, tomorrow.   Patient feeling a little better today. She reports she is swallowing food and drink a little better. Abdomen soft.  Continue to hold Plavix. Clear liquid diet. NPO at midnight. Patient and primary nurse updated.  Consent signed and in chart.      no

## 2024-03-29 NOTE — CARE COORDINATION
Chart reviewed and patient discussed in IDR. No new needs identified. Plan remains home. CM following. SB

## 2024-03-29 NOTE — PROGRESS NOTES
Comprehensive Nutrition Assessment    Type and Reason for Visit:  Reassess    Nutrition Recommendations/Plan:   Diet per GI, will offer clear liquid oral nutrition supplement TID until diet advances  Re-order Standard High calorie oral nutrition supplement TID once advanced past fulls   Consider appetite stimulant prn   Monitor PO intakes, GI status, weights, fluids, labs, lytes, glucose, and plan of care      Malnutrition Assessment:  Malnutrition Status:  Severe malnutrition (03/26/24 1225)    Context:  Chronic Illness       Nutrition Assessment:    EGD rescheduled to tomorrow. SLP recommended easy to chew/thin liquid diet. Overall pt consuming <75% over the past 5 days. Previous 2 meals of %, but most meals during admit of 0% over the past 5 days. Oxygen levels are improving, currently on 2L nC> Remains malnourished. Follow as high nutrition risk.    Nutrition Related Findings:    Some POCT <70. Started on clears for test. Meds and labs reviewed. Wound Type: None       Current Nutrition Intake & Therapies:    Average Meal Intake: 0%, 1-25%, %, 51-75%, Refusing to eat (mostly 0%)  Average Supplements Intake: Unable to assess  ADULT DIET; Clear Liquid  ADULT ORAL NUTRITION SUPPLEMENT; Breakfast, Lunch, Dinner; Standard High Calorie/High Protein Oral Supplement  Diet NPO    Anthropometric Measures:  Height: 147.3 cm (4' 10\")  Ideal Body Weight (IBW): 90 lbs (41 kg)    Admission Body Weight: 33.7 kg (74 lb 4.7 oz)  Current Body Weight: 34.8 kg (76 lb 11.5 oz), 85.2 % IBW. Weight Source: Bed Scale  Current BMI (kg/m2): 16  Usual Body Weight: 33.4 kg (73 lb 9.4 oz) (3/17/2023)  % Weight Change (Calculated): 1  Weight Adjustment For: No Adjustment                 BMI Categories: Underweight (BMI less than 22) age over 65    Estimated Daily Nutrient Needs:  Energy Requirements Based On: Kcal/kg  Weight Used for Energy Requirements: Current  Energy (kcal/day): 7042-1302 (35-40 kcals/kg, for weight

## 2024-03-30 ENCOUNTER — ANESTHESIA (OUTPATIENT)
Dept: ENDOSCOPY | Age: 64
End: 2024-03-30

## 2024-03-30 LAB — PROCALCITONIN SERPL-MCNC: 0.19 NG/ML

## 2024-03-30 PROCEDURE — 6370000000 HC RX 637 (ALT 250 FOR IP): Performed by: INTERNAL MEDICINE

## 2024-03-30 PROCEDURE — 3700000001 HC ADD 15 MINUTES (ANESTHESIA): Performed by: INTERNAL MEDICINE

## 2024-03-30 PROCEDURE — 6360000002 HC RX W HCPCS: Performed by: INTERNAL MEDICINE

## 2024-03-30 PROCEDURE — 2709999900 HC NON-CHARGEABLE SUPPLY: Performed by: INTERNAL MEDICINE

## 2024-03-30 PROCEDURE — 94640 AIRWAY INHALATION TREATMENT: CPT

## 2024-03-30 PROCEDURE — 6360000002 HC RX W HCPCS: Performed by: NURSE PRACTITIONER

## 2024-03-30 PROCEDURE — 84145 PROCALCITONIN (PCT): CPT

## 2024-03-30 PROCEDURE — 2500000003 HC RX 250 WO HCPCS: Performed by: NURSE ANESTHETIST, CERTIFIED REGISTERED

## 2024-03-30 PROCEDURE — 97530 THERAPEUTIC ACTIVITIES: CPT

## 2024-03-30 PROCEDURE — 2580000003 HC RX 258: Performed by: INTERNAL MEDICINE

## 2024-03-30 PROCEDURE — 94618 PULMONARY STRESS TESTING: CPT

## 2024-03-30 PROCEDURE — 94761 N-INVAS EAR/PLS OXIMETRY MLT: CPT

## 2024-03-30 PROCEDURE — 3609012400 HC EGD TRANSORAL BIOPSY SINGLE/MULTIPLE: Performed by: INTERNAL MEDICINE

## 2024-03-30 PROCEDURE — 97116 GAIT TRAINING THERAPY: CPT

## 2024-03-30 PROCEDURE — 36415 COLL VENOUS BLD VENIPUNCTURE: CPT

## 2024-03-30 PROCEDURE — 6360000002 HC RX W HCPCS: Performed by: NURSE ANESTHETIST, CERTIFIED REGISTERED

## 2024-03-30 PROCEDURE — 0DB58ZX EXCISION OF ESOPHAGUS, VIA NATURAL OR ARTIFICIAL OPENING ENDOSCOPIC, DIAGNOSTIC: ICD-10-PCS | Performed by: INTERNAL MEDICINE

## 2024-03-30 PROCEDURE — 99232 SBSQ HOSP IP/OBS MODERATE 35: CPT | Performed by: INTERNAL MEDICINE

## 2024-03-30 PROCEDURE — 1200000000 HC SEMI PRIVATE

## 2024-03-30 PROCEDURE — 2700000000 HC OXYGEN THERAPY PER DAY

## 2024-03-30 PROCEDURE — 0DB68ZX EXCISION OF STOMACH, VIA NATURAL OR ARTIFICIAL OPENING ENDOSCOPIC, DIAGNOSTIC: ICD-10-PCS | Performed by: INTERNAL MEDICINE

## 2024-03-30 PROCEDURE — 2580000003 HC RX 258: Performed by: NURSE ANESTHETIST, CERTIFIED REGISTERED

## 2024-03-30 PROCEDURE — 3700000000 HC ANESTHESIA ATTENDED CARE: Performed by: INTERNAL MEDICINE

## 2024-03-30 RX ORDER — MORPHINE SULFATE 2 MG/ML
2 INJECTION, SOLUTION INTRAMUSCULAR; INTRAVENOUS ONCE
Status: COMPLETED | OUTPATIENT
Start: 2024-03-30 | End: 2024-03-30

## 2024-03-30 RX ORDER — IPRATROPIUM BROMIDE AND ALBUTEROL SULFATE 2.5; .5 MG/3ML; MG/3ML
1 SOLUTION RESPIRATORY (INHALATION) EVERY 4 HOURS PRN
Status: DISCONTINUED | OUTPATIENT
Start: 2024-03-30 | End: 2024-04-05 | Stop reason: HOSPADM

## 2024-03-30 RX ORDER — BUDESONIDE AND FORMOTEROL FUMARATE DIHYDRATE 160; 4.5 UG/1; UG/1
2 AEROSOL RESPIRATORY (INHALATION)
Status: DISCONTINUED | OUTPATIENT
Start: 2024-03-30 | End: 2024-04-05 | Stop reason: HOSPADM

## 2024-03-30 RX ORDER — PROPOFOL 10 MG/ML
INJECTION, EMULSION INTRAVENOUS PRN
Status: DISCONTINUED | OUTPATIENT
Start: 2024-03-30 | End: 2024-03-30 | Stop reason: SDUPTHER

## 2024-03-30 RX ORDER — SODIUM CHLORIDE 9 MG/ML
INJECTION, SOLUTION INTRAVENOUS CONTINUOUS PRN
Status: DISCONTINUED | OUTPATIENT
Start: 2024-03-30 | End: 2024-03-30 | Stop reason: SDUPTHER

## 2024-03-30 RX ORDER — FLUCONAZOLE 100 MG/1
100 TABLET ORAL DAILY
Status: DISCONTINUED | OUTPATIENT
Start: 2024-03-30 | End: 2024-04-05 | Stop reason: HOSPADM

## 2024-03-30 RX ORDER — LIDOCAINE HYDROCHLORIDE 20 MG/ML
INJECTION, SOLUTION EPIDURAL; INFILTRATION; INTRACAUDAL; PERINEURAL PRN
Status: DISCONTINUED | OUTPATIENT
Start: 2024-03-30 | End: 2024-03-30 | Stop reason: SDUPTHER

## 2024-03-30 RX ADMIN — NYSTATIN 500000 UNITS: 100000 SUSPENSION ORAL at 13:16

## 2024-03-30 RX ADMIN — IPRATROPIUM BROMIDE AND ALBUTEROL SULFATE 1 DOSE: .5; 2.5 SOLUTION RESPIRATORY (INHALATION) at 08:07

## 2024-03-30 RX ADMIN — AZITHROMYCIN MONOHYDRATE 500 MG: 500 INJECTION, POWDER, LYOPHILIZED, FOR SOLUTION INTRAVENOUS at 11:40

## 2024-03-30 RX ADMIN — BUDESONIDE AND FORMOTEROL FUMARATE DIHYDRATE 2 PUFF: 160; 4.5 AEROSOL RESPIRATORY (INHALATION) at 21:39

## 2024-03-30 RX ADMIN — SENNOSIDES 8.6 MG: 8.6 TABLET, FILM COATED ORAL at 22:46

## 2024-03-30 RX ADMIN — METOPROLOL TARTRATE 25 MG: 25 TABLET, FILM COATED ORAL at 11:26

## 2024-03-30 RX ADMIN — IPRATROPIUM BROMIDE AND ALBUTEROL SULFATE 1 DOSE: .5; 2.5 SOLUTION RESPIRATORY (INHALATION) at 15:28

## 2024-03-30 RX ADMIN — SODIUM CHLORIDE, PRESERVATIVE FREE 10 ML: 5 INJECTION INTRAVENOUS at 22:52

## 2024-03-30 RX ADMIN — PROPOFOL 50 MG: 10 INJECTION, EMULSION INTRAVENOUS at 09:40

## 2024-03-30 RX ADMIN — BUSPIRONE HYDROCHLORIDE 5 MG: 5 TABLET ORAL at 22:44

## 2024-03-30 RX ADMIN — BUSPIRONE HYDROCHLORIDE 5 MG: 5 TABLET ORAL at 11:25

## 2024-03-30 RX ADMIN — RANOLAZINE 500 MG: 500 TABLET, EXTENDED RELEASE ORAL at 11:26

## 2024-03-30 RX ADMIN — SODIUM CHLORIDE, PRESERVATIVE FREE 10 ML: 5 INJECTION INTRAVENOUS at 11:43

## 2024-03-30 RX ADMIN — METOPROLOL TARTRATE 25 MG: 25 TABLET, FILM COATED ORAL at 22:44

## 2024-03-30 RX ADMIN — SODIUM CHLORIDE: 9 INJECTION, SOLUTION INTRAVENOUS at 09:14

## 2024-03-30 RX ADMIN — MORPHINE SULFATE 2 MG: 2 INJECTION, SOLUTION INTRAMUSCULAR; INTRAVENOUS at 03:51

## 2024-03-30 RX ADMIN — NYSTATIN 500000 UNITS: 100000 SUSPENSION ORAL at 22:46

## 2024-03-30 RX ADMIN — IPRATROPIUM BROMIDE AND ALBUTEROL SULFATE 1 DOSE: .5; 2.5 SOLUTION RESPIRATORY (INHALATION) at 11:39

## 2024-03-30 RX ADMIN — TIOTROPIUM BROMIDE AND OLODATEROL 2 PUFF: 3.124; 2.736 SPRAY, METERED RESPIRATORY (INHALATION) at 08:07

## 2024-03-30 RX ADMIN — RANOLAZINE 500 MG: 500 TABLET, EXTENDED RELEASE ORAL at 22:46

## 2024-03-30 RX ADMIN — ROSUVASTATIN CALCIUM 20 MG: 20 TABLET, COATED ORAL at 11:26

## 2024-03-30 RX ADMIN — FLUCONAZOLE 100 MG: 100 TABLET ORAL at 11:28

## 2024-03-30 RX ADMIN — CEFTRIAXONE 1000 MG: 1 INJECTION, POWDER, FOR SOLUTION INTRAMUSCULAR; INTRAVENOUS at 11:35

## 2024-03-30 RX ADMIN — GABAPENTIN 600 MG: 300 CAPSULE ORAL at 22:43

## 2024-03-30 RX ADMIN — AMLODIPINE BESYLATE 5 MG: 5 TABLET ORAL at 11:25

## 2024-03-30 RX ADMIN — POLYETHYLENE GLYCOL 3350 17 G: 17 POWDER, FOR SOLUTION ORAL at 11:42

## 2024-03-30 RX ADMIN — ONDANSETRON 4 MG: 4 TABLET, ORALLY DISINTEGRATING ORAL at 15:12

## 2024-03-30 RX ADMIN — SODIUM CHLORIDE: 9 INJECTION, SOLUTION INTRAVENOUS at 11:34

## 2024-03-30 RX ADMIN — LIDOCAINE HYDROCHLORIDE 50 MG: 20 INJECTION, SOLUTION EPIDURAL; INFILTRATION; INTRACAUDAL; PERINEURAL at 09:40

## 2024-03-30 RX ADMIN — ENOXAPARIN SODIUM 30 MG: 100 INJECTION SUBCUTANEOUS at 11:46

## 2024-03-30 ASSESSMENT — PAIN DESCRIPTION - ORIENTATION: ORIENTATION: POSTERIOR

## 2024-03-30 ASSESSMENT — PAIN DESCRIPTION - DESCRIPTORS
DESCRIPTORS: ACHING
DESCRIPTORS: PRESSURE

## 2024-03-30 ASSESSMENT — PAIN SCALES - GENERAL
PAINLEVEL_OUTOF10: 7
PAINLEVEL_OUTOF10: 6

## 2024-03-30 ASSESSMENT — LIFESTYLE VARIABLES: SMOKING_STATUS: 1

## 2024-03-30 ASSESSMENT — PAIN - FUNCTIONAL ASSESSMENT: PAIN_FUNCTIONAL_ASSESSMENT: ACTIVITIES ARE NOT PREVENTED

## 2024-03-30 ASSESSMENT — PAIN DESCRIPTION - LOCATION
LOCATION: HEAD
LOCATION: HEAD

## 2024-03-30 NOTE — CARE COORDINATION
DATE OF SERVICE: 1/28/2022    DIAGNOSIS:  Prostate cancer (HCC)  Staging form: Prostate, AJCC 8th Edition  - Clinical stage from 10/8/2021: Stage IIB (cT1c, cN0, cM0, PSA: 4.1, Grade Group: 2) - Signed by Kirk DELANEY M.D. on 10/8/2021  Histopathologic type: Adenocarcinoma, NOS  Stage prefix: Initial diagnosis  Prostate specific antigen (PSA) range: Less than 10  Sarah primary pattern: 3  [place] secondary pattern: 4  Sarah score: 7  Histologic grading system: 5 grade system  Bilateral cancer: Yes  Number of biopsy cores examined: 12  Number of biopsy cores positive: 6  Location of positive needle core biopsies: Both sides       DATE OF SERVICE: 1/28/2022    TYPE OF SIMULATION: Pelvis    GOAL OF TREATMENT:   [x] Curative  [] Palliative  [] Oligometastatic    CONTRAST:    [x] IV Contrast*  [x] Small Bowel  [] Rectal  [] Urethral              POSITION:    [x]  Supine  [] Prone with belly board    COMPLEX:  [] Complex Blocking   [x]Arcs  [] Custom Blocks  [] >3 Sites    PROCEDURE: Patient positioned on CT table in Vac-Cayla immobilization device with or without belly board depending on position. CT acquired thorough the entire volume of interest.  Images reviewed and exported to treatment planning system.    I have personally reviewed the relevant data, performed the target localization, and determined all relevant factors for this patient’s simulation.    *Omnipaque 80 -100cc IVP in conjunction with 500cc NS     Spoke with pt in room. Pt is agreeable to SNF. She stated that she is agreeable to [facility] more than SNF.  Pt is listed as self-pay. Spoke with pt about insurance. She states that she has been approved for medicaid. CM sent message to  asking to confirm this. [facility] does not accept medicaid.     CM brought SNF list to pt to review. The pt is unable to read without her glasses which are with her son at home. CM asked her to call her son and have him bring her glasses in the morning or tonight so she can read the list. Instructed her to choose 2-3 options. Kept on weekend list for CM to f/u for choice in the morning.

## 2024-03-30 NOTE — PROGRESS NOTES
Physical Therapy    Physical Therapy Treatment Note  Name: Kate Minor MRN: [id number] :   1960   Date:  3/30/2024   Admission Date: 3/24/2024 Room:  [address] [place] [address]   Restrictions/Precautions:          General Precautions, Fall Risk   Communication with other providers:  per chart review pt is appropriate for tx  Subjective:  Patient states:  pt agreeable to tx. Pt states \" I don't want to go to a nursing home\"  Pain:   Location, Type, Intensity (0/10 to 10/10):  no c/o pain during tx session  Objective:    Observation:  alert and oriented on 4 liters O2.  Objective Measures:  O2 drops into upper 80's with activity. Pt is very talkative and cued for PLB and then recovers quickly.  Treatment, including education/measures:  Sup<=>sit sba  Sit<=>stand from bed and commode sba  Pt is independent I with sera care in sitting and sba for standing at sink unsupported to wash hands.  Amb without assistive device 10' x 2,  20' x 1 sba  Safety  Patient left safely in the bed, with call light/phone in reach. Gait belt was used for transfers and gait. nursing notified pt is so small bed alarm goes of as soon as it is set.  Assessment / Impression:      Patient's tolerance of treatment:  good   Adverse Reaction: na  Significant change in status and impact:  na  Barriers to improvement:  SOB with activity  Plan for Next Session:    Cont. POC  Time in:  1555  Time out:  1620  Timed treatment minutes:  25  Total treatment time:  25    Previously filed items:  Social/Functional History  Lives With: Son, Family  Type of Home: Apartment  Home Layout: One level  Home Access: Stairs to enter with rails  Entrance Stairs - Number of Steps: 37 stairs to get to apartment  Bathroom Shower/Tub: Tub/Shower unit  Bathroom Toilet: Standard  Bathroom Equipment: Tub transfer bench  Has the patient had two or more falls in the past year or any fall with injury in the past year?: No  Receives Help From: Family  ADL Assistance:  Independent  Homemaking Assistance: Independent (light cleaning. family completes rest)  Ambulation Assistance: Independent  Transfer Assistance: Independent        Short Term Goals  Time Frame for Short Term Goals: 1 week or until discharge  Short Term Goal 1: Patient will perform all aspects of bed mobility with mod-I  Short Term Goal 2: Patient will perform functional transfers with LRAD and mod-I  Short Term Goal 3: Patient will ambulate 100' with LRAD and mod-I  Short Term Goal 4: Patient will negotiate 37 steps with unilateral HR and CGA    Electronically signed by:    Irina Mejia PTA  3/30/2024, 8:20 AM

## 2024-03-30 NOTE — OP NOTE
Operative Note      Patient: Kate Minor  YOB: 1960  MRN: [id number]    Date of Procedure: 3/30/2024    Pre-Op Diagnosis Codes:     * Dysphagia, unspecified type [R13.10]    [facility]      BRIEF OP REPORT:    Impression:    1) EGD showing normal esophagus mucosa but some white exudative spots scattered all over consistent with Candida  Biopsies of midesophagus for eosinophilic esophagitis and Candida   2) stomach showing patches of erythema in the antrum consistent with gastritis rest of gastric mucosa normal biopsy of antrum and body for H. pylori done   3) duodenum normal        Suggest:   1) keep PPI once a day   2) start Diflucan 100 mg once a day for 10 days   3) advance diet as tolerated can be discharged from GI standpoint follow-up as outpatient     Full EGD/COLONOSCOPY report available by going to \"chart review\" then \"procedures\" then  \"EGD/Colonoscopy\"  then \"View Endoscopy Report\"       Please note that time of note may not reflect time of procedure     Electronically signed by Maximo Mittal MD on 3/30/2024 at 9:46 AM

## 2024-03-30 NOTE — PROGRESS NOTES
Patient was seen in hospital for COPD .  I am prescribing oxygen because the diagnosis and testing requires the patient to have oxygen in the home.  Conditions will improve or be benefited by oxygen use.  The patient is able to perform good mobility and therefore requires the use of a portable oxygen system for ambulation.

## 2024-03-30 NOTE — ANESTHESIA PRE PROCEDURE
Department of Anesthesiology  Preprocedure Note       Name:  Kate Minor   Age:  63 y.o.  :  1960                                          MRN:  [id number]         Date:  3/30/2024      Surgeon: Surgeon(s):  Maximo Mittal MD    Procedure: Procedure(s):  ESOPHAGOGASTRODUODENOSCOPY    Medications prior to admission:   Prior to Admission medications    Medication Sig Start Date End Date Taking? Authorizing Provider   busPIRone (BUSPAR) 10 MG tablet Take 0.5 tablets by mouth 2 times daily 2/15/24  Yes Indu Hudson MD   amLODIPine (NORVASC) 5 MG tablet Take 1 tablet by mouth daily 3/20/24   Debra Land APRN - CNP   ranolazine (RANEXA) 500 MG extended release tablet Take 1 tablet by mouth 2 times daily 3/20/24   Debra Land APRN - CNP   metoprolol tartrate (LOPRESSOR) 25 MG tablet Take 1 tablet by mouth 2 times daily 24   Doc Rubio MD   nicotine (NICODERM CQ) 7 MG/24HR Place 1 patch onto the skin daily for 14 days 24  Doc Rubio MD   clopidogrel (PLAVIX) 75 MG tablet Take 1 tablet by mouth daily 24   Grisel Neely MD   tiotropium-olodaterol (STIOLTO RESPIMAT) 2.5-2.5 MCG/ACT AERS Inhale 2 puffs into the lungs daily 24   Grisel Neely MD   albuterol (PROVENTIL) (2.5 MG/3ML) 0.083% nebulizer solution Take 3 mLs by nebulization every 6 hours as needed for Wheezing 24   Grisel Neely MD   omeprazole (PRILOSEC) 40 MG delayed release capsule Take 1 capsule by mouth daily    Indu Hudson MD   rosuvastatin (CRESTOR) 20 MG tablet Take 1 tablet by mouth daily    Indu Hudson MD   albuterol sulfate HFA (VENTOLIN HFA) 108 (90 BASE) MCG/ACT inhaler Inhale 2 puffs into the lungs every 6 hours as needed for Wheezing 3/16/17   Ebony Tijerina, APRN - CNP   gabapentin (NEURONTIN) 600 MG tablet Take 1 tablet by mouth daily 17   Ebony Tijerina, APRN - CNP       Current medications:  
disease:, TIA            GI/Hepatic/Renal:             Endo/Other:                     Abdominal:             Vascular:          Other Findings:             Anesthesia Plan      MAC     ASA 4     (Preop chartreview only. )                              Jeff Jovel APRN - CRNA   3/29/2024

## 2024-03-30 NOTE — PROGRESS NOTES
3/30/2024 9:55 AM  Patient Room #: ENDO/NONE  Patient Name: Kate Minor    (Step 1 Done by RN if possible otherwise call Pulmonary Diagnostics)  Place patient on room air at rest for at least 30 minutes.  If patient falls below 88% before 30 minutes then you can record the level and stop.  Record room air saturation level _84_ %.  If patient is at 88% or below, they will qualify for home oxygen and you can stop.  If level does not fall below 88%, fill in level above. If indicated continue to Step 2.   Signature:Willy Cartwright, RRT Date: 03/30/2024___  (Step 2&3 Done by [facility])  Ambulate patient on room air until saturation falls below 89%.  Record level of room air saturation with ambulation___ %.  Next, place patient back on ___lpm oxygen and ambulate, record level __%.  (Note:  this level must show improvement from room air level done with ambulation.)  If patient’s saturation on room air with ambulation is 88% or below AND patient shows improvement with oxygen during ambulation, they will qualify for home oxygen and you can stop.  If patient does not drop below 89%, then patient should have an overnight oximetry trending on room air to see if level falls below 88%.  Complete level in Step 3 below.    Room air overnight oximetry level 88 % for___  cumulative minutes.  If patient’s room air oxygen level is below <89% for any amount of time they will qualify for nocturnal home oxygen.        (Attach Night Trending Report)    Complete order below: Diagnosis:COPD  Home oxygen at:  Length of Need: X Lifetime ? 3 Months     _2__lpm or __%   via  [x] nasal cannula  []mask  [] other         [x]continuous [x]  with activity  [x]  Nocturnal   [x] Portable Tanks [x]  Concentrator  [x] Conserving Device        Therapist Signature:Yvonne Stokes, RRT  Date:03/30/2024  ___  Physician Signature:  __Electronically Signed in EMR_    Date:___  Physician Printed Name:Ordering User: Venus Marmolejo MD  Provider ID:

## 2024-03-30 NOTE — ANESTHESIA POSTPROCEDURE EVALUATION
Department of Anesthesiology  Postprocedure Note    Patient: Kate Minor  MRN: [id number]  YOB: 1960  Date of evaluation: 3/30/2024    Procedure Summary       Date: 03/30/24 Room / Location: [address]    Anesthesia Start: 0927 Anesthesia Stop: 0953    Procedure: ESOPHAGOGASTRODUODENOSCOPY BIOPSY Diagnosis:       Dysphagia, unspecified type      (Dysphagia, unspecified type [R13.10])    Surgeons: Maximo Mittal MD Responsible Provider: Conor Parham MD    Anesthesia Type: MAC ASA Status: 4            Anesthesia Type: No value filed.    Vick Phase I:  10    Vick Phase II:  10    Anesthesia Post Evaluation    Patient location during evaluation: bedside  Patient participation: complete - patient participated  Level of consciousness: awake and alert  Pain score: 0  Airway patency: patent  Nausea & Vomiting: no nausea and no vomiting  Cardiovascular status: hemodynamically stable  Respiratory status: acceptable, room air, spontaneous ventilation and nonlabored ventilation  Hydration status: euvolemic  Pain management: adequate        No notable events documented.

## 2024-03-30 NOTE — PROGRESS NOTES
V2.0    [facility] Progress Note      Name:  Kate Minor /Age/Sex: 1960  (63 y.o. female)   MRN & CSN:  [id number] & [id number] Encounter Date/Time: 3/30/2024 7:52 AM EDT   [facility] PCP: Sharyn Delatorre APRN - CNP     Attending:Venus Marmolejo MD       Hospital Day: 7    Assessment and Recommendations   Kate Minor is a 63 y.o. female with pmh of COPD, hypertension, Salas's esophagus, protein calorie malnutrition  who presents with Acute hypoxemic respiratory failure (HCC)    # Dysphagia likely 2/2 fungal esophagitis: Had difficulty swallowing on 3/27, kept n.p.o., consulted SLP and GI.  Evaluated by GI and recommended EGD tomorrow, continue IV PPI.  Continue to monitor.  -Patient reports that swallowing is significantly improved  -GI team on board-EGD done 3/30/2024-normal esophagus mucosa but some white exudative spots scattered all over consistent with Candida esophagitis; biopsy of midesophagus for eosinophilic esophagitis and Candida taken; stomach showing patches of erythema in the antrum consistent with gastritis/biopsy for H. pylori taken; duodenum reported to be normal  -Diet advanced to regular  -Patient stable for discharge but needs placement    # Sepsis and acute  Respiratory Failure with hypoxia and hypercapnia Secondary to Flu/COPD exacerbation/CAP: Presented with shortness of breath and sore throat, SIRS 4/4 with tachycardia, tachypnea, temperature, elevated WBC lactic acid normal, VBG showed pCO2 55-59, requiring BiPAP, respiratory panel showed influenza A positive, started on ceftriaxone, azithromycin, oseltamivir, obtain blood cultures no growth till date,  procalcitonin 1.93  -wean off O2 as tolerated  -change inhaler to include LABA, LAMA, and ICS - symbicort + tiotropium  -PRN duoneb    # Influenza Positive: Completed Tamiflu and continue prn cough suppressants, throat lonzenge.    # COPD Exacerbation: Continue scheduled bronchodilators and steroid therapy,  3/30/2024 1629  Last data filed at 3/30/2024 1247  Gross per 24 hour   Intake 440 ml   Output 700 ml   Net -260 ml        Vitals:   Vitals:    03/30/24 1045 03/30/24 1125 03/30/24 1140 03/30/24 1508   BP: (!) 142/64 (!) 142/64  126/68   Pulse: 69 63  65   Resp: 17   16   Temp: 97.7 °F (36.5 °C)   98.4 °F (36.9 °C)   TempSrc: Oral   Oral   SpO2: 95%  96% 93%   Weight:       Height:             Physical Exam:      General: Afebrile, in distress  Eyes: EOMI  ENT: neck supple, no JVD  Cardiovascular: S1-S2 normal no murmur  Respiratory: Air entry good bilaterally bilateral expiratory wheeze heard  Gastrointestinal: Soft, nontender  bowel sounds normal  Genitourinary: no suprapubic tenderness  Musculoskeletal: No edema  Skin: warm, dry  Neuro: Alert.  Oriented no focal deficit  Psych: Mood appropriate.         Medications:   Medications:    fluconazole  100 mg Oral Daily    nystatin  5 mL Oral 4x Daily    amLODIPine  5 mg Oral Daily    metoprolol tartrate  25 mg Oral BID    senna  1 tablet Oral Nightly    polyethylene glycol  17 g Oral Daily    gabapentin  600 mg Oral Once    lidocaine  1 patch TransDERmal Daily    lidocaine  1 patch TransDERmal Daily    nicotine  1 patch TransDERmal Daily    cefTRIAXone (ROCEPHIN) IV  1,000 mg IntraVENous Q24H    gabapentin  600 mg Oral Nightly    sodium chloride flush  5-40 mL IntraVENous 2 times per day    ipratropium 0.5 mg-albuterol 2.5 mg  1 Dose Inhalation Q4H WA RT    [Held by provider] clopidogrel  75 mg Oral Daily    pantoprazole  40 mg Oral QAM AC    ranolazine  500 mg Oral BID    rosuvastatin  20 mg Oral Daily    azithromycin  500 mg IntraVENous Q24H    enoxaparin  30 mg SubCUTAneous Daily    tiotropium-olodaterol  2 puff Inhalation Daily    busPIRone  5 mg Oral BID      Infusions:    sodium chloride 25 mL/hr at 03/30/24 1134     PRN Meds: hydrALAZINE, 10 mg, Q4H PRN  labetalol, 10 mg, Q4H PRN  sodium chloride flush, 5-40 mL, PRN  sodium chloride, , PRN  ondansetron, 4 mg,

## 2024-03-30 NOTE — PROGRESS NOTES
Occupational Therapy  Attempted to see pt on this date for OT session. Pt off floor at this time. Will attempt as able and appropriate.    Carmina BARAHONA

## 2024-03-30 NOTE — PROGRESS NOTES
Pulmonary and Critical Care  Progress Note      VITALS:  BP (!) 142/64   Pulse 63   Temp 97.7 °F (36.5 °C) (Oral)   Resp 17   Ht 1.473 m (4' 10\")   Wt 34.8 kg (76 lb 11.5 oz)   SpO2 96%   BMI 16.03 kg/m²     Subjective:   CHIEF COMPLAINT :SOB     HPI:                The patient is a 63 y.o. female is sitting in the bed, eating lunch. She is in mild resp distress.    Objective:   PHYSICAL EXAM:    LUNGS:Occasional basal crackles  Abd-soft,BS+,NT  Ext- no pedal edema  CVS-s1s2, no murmurs      DATA:    CBC:  Recent Labs     03/28/24 0223   WBC 10.1   RBC 4.06*   HGB 13.1   HCT 39.7      MCV 97.8   MCH 32.3*   MCHC 33.0   RDW 12.3   SEGSPCT 87.5*      BMP:  Recent Labs     03/28/24 0223 03/29/24  0806    139   K 4.2 4.7    98*   CO2 30 30   BUN 16 29*   CREATININE 0.6 0.6   CALCIUM 8.3 9.0   GLUCOSE 120* 61*      ABG:  No results for input(s): \"PH\", \"PO2ART\", \"YLN0FOL\", \"HCO3\", \"BEART\", \"O2SAT\" in the last 72 hours.  BNP  No results found for: \"BNP\"   D-Dimer:  No results found for: \"DDIMER\"   Radiology: None      Assessment/Plan     Patient Active Problem List    Diagnosis Date Noted    Acute hypoxemic respiratory failure ([facility]) 03/24/2024    NSTEMI (non-ST elevated myocardial infarction) ([facility]) 03/16/2024    COPD exacerbation ([facility]) 02/05/2024    Severe malnutrition ([facility]) 01/24/2024     Class: Chronic    Chronic obstructive pulmonary disease with acute exacerbation ([facility]) 01/23/2024    Coronary artery disease involving native coronary artery of native heart with angina pectoris ([facility]) 01/23/2024     Overview Note:     Coronary anatomy:   The left main coronary artery has no significant disease.      Left anterior descending artery has no significant disease.     Ramus has 60-70% proximal segment stenosis     Circumflex artery has no significant disease 60-70% distal segment stenosis     The right coronary artery is a non-dominant vessel with no significant disease     Left ventriculogram shows  ejection fraction of 55%. Normal wall motion.      Chest pain 07/28/2016    Tobacco use 07/21/2015    Hypertension 05/06/2015    Barretts esophagus 05/06/2015    Emphysema/COPD (HCC) 05/06/2015     Acute on chronic Hypoxic Hypercapnic resp failure-  improving  Chronic Metabolic alkalosis  AECOPD- improving  Mild to Moderate AR  Mild Pulmonary HTN  Debility  Cachexia  Influenza A pneumonia     Inhalers  Abx  F/u C&S  ICS  OOB  Keep sats > 92%  Prednisone taper  DVT and GI Prophylaxis  Home O2 eval  C/w present management    Electronically signed by Cristhian Holder MD on 3/30/2024 at 11:41 AM

## 2024-03-31 PROCEDURE — 2700000000 HC OXYGEN THERAPY PER DAY

## 2024-03-31 PROCEDURE — 6370000000 HC RX 637 (ALT 250 FOR IP): Performed by: INTERNAL MEDICINE

## 2024-03-31 PROCEDURE — 2580000003 HC RX 258: Performed by: INTERNAL MEDICINE

## 2024-03-31 PROCEDURE — 6360000002 HC RX W HCPCS: Performed by: INTERNAL MEDICINE

## 2024-03-31 PROCEDURE — 94761 N-INVAS EAR/PLS OXIMETRY MLT: CPT

## 2024-03-31 PROCEDURE — 1200000000 HC SEMI PRIVATE

## 2024-03-31 PROCEDURE — 94150 VITAL CAPACITY TEST: CPT

## 2024-03-31 PROCEDURE — 94640 AIRWAY INHALATION TREATMENT: CPT

## 2024-03-31 PROCEDURE — 99232 SBSQ HOSP IP/OBS MODERATE 35: CPT | Performed by: INTERNAL MEDICINE

## 2024-03-31 RX ADMIN — GABAPENTIN 600 MG: 300 CAPSULE ORAL at 22:01

## 2024-03-31 RX ADMIN — ONDANSETRON 4 MG: 4 TABLET, ORALLY DISINTEGRATING ORAL at 11:40

## 2024-03-31 RX ADMIN — BUDESONIDE AND FORMOTEROL FUMARATE DIHYDRATE 2 PUFF: 160; 4.5 AEROSOL RESPIRATORY (INHALATION) at 21:01

## 2024-03-31 RX ADMIN — FLUCONAZOLE 100 MG: 100 TABLET ORAL at 11:22

## 2024-03-31 RX ADMIN — NYSTATIN 500000 UNITS: 100000 SUSPENSION ORAL at 17:02

## 2024-03-31 RX ADMIN — ENOXAPARIN SODIUM 30 MG: 100 INJECTION SUBCUTANEOUS at 11:23

## 2024-03-31 RX ADMIN — NYSTATIN 500000 UNITS: 100000 SUSPENSION ORAL at 11:24

## 2024-03-31 RX ADMIN — BUDESONIDE AND FORMOTEROL FUMARATE DIHYDRATE 2 PUFF: 160; 4.5 AEROSOL RESPIRATORY (INHALATION) at 07:19

## 2024-03-31 RX ADMIN — SODIUM CHLORIDE, PRESERVATIVE FREE 10 ML: 5 INJECTION INTRAVENOUS at 11:29

## 2024-03-31 RX ADMIN — RANOLAZINE 500 MG: 500 TABLET, EXTENDED RELEASE ORAL at 22:02

## 2024-03-31 RX ADMIN — AZITHROMYCIN MONOHYDRATE 500 MG: 500 INJECTION, POWDER, LYOPHILIZED, FOR SOLUTION INTRAVENOUS at 11:55

## 2024-03-31 RX ADMIN — CEFTRIAXONE 1000 MG: 1 INJECTION, POWDER, FOR SOLUTION INTRAMUSCULAR; INTRAVENOUS at 11:58

## 2024-03-31 RX ADMIN — METOPROLOL TARTRATE 25 MG: 25 TABLET, FILM COATED ORAL at 11:22

## 2024-03-31 RX ADMIN — METOPROLOL TARTRATE 25 MG: 25 TABLET, FILM COATED ORAL at 22:02

## 2024-03-31 RX ADMIN — BUSPIRONE HYDROCHLORIDE 5 MG: 5 TABLET ORAL at 11:23

## 2024-03-31 RX ADMIN — ROSUVASTATIN CALCIUM 20 MG: 20 TABLET, COATED ORAL at 11:23

## 2024-03-31 RX ADMIN — AMLODIPINE BESYLATE 5 MG: 5 TABLET ORAL at 11:23

## 2024-03-31 RX ADMIN — BUSPIRONE HYDROCHLORIDE 5 MG: 5 TABLET ORAL at 22:01

## 2024-03-31 RX ADMIN — POLYETHYLENE GLYCOL 3350 17 G: 17 POWDER, FOR SOLUTION ORAL at 11:26

## 2024-03-31 RX ADMIN — SODIUM CHLORIDE, PRESERVATIVE FREE 10 ML: 5 INJECTION INTRAVENOUS at 22:10

## 2024-03-31 RX ADMIN — ACETAMINOPHEN 650 MG: 325 TABLET ORAL at 01:48

## 2024-03-31 RX ADMIN — IPRATROPIUM BROMIDE AND ALBUTEROL SULFATE 1 DOSE: 2.5; .5 SOLUTION RESPIRATORY (INHALATION) at 22:51

## 2024-03-31 RX ADMIN — TIOTROPIUM BROMIDE INHALATION SPRAY 2 PUFF: 3.12 SPRAY, METERED RESPIRATORY (INHALATION) at 07:19

## 2024-03-31 RX ADMIN — NYSTATIN 500000 UNITS: 100000 SUSPENSION ORAL at 22:05

## 2024-03-31 RX ADMIN — PANTOPRAZOLE SODIUM 40 MG: 40 TABLET, DELAYED RELEASE ORAL at 06:08

## 2024-03-31 RX ADMIN — SENNOSIDES 8.6 MG: 8.6 TABLET, FILM COATED ORAL at 22:01

## 2024-03-31 RX ADMIN — RANOLAZINE 500 MG: 500 TABLET, EXTENDED RELEASE ORAL at 11:23

## 2024-03-31 RX ADMIN — ACETAMINOPHEN 650 MG: 325 TABLET ORAL at 22:08

## 2024-03-31 ASSESSMENT — PAIN SCALES - WONG BAKER
WONGBAKER_NUMERICALRESPONSE: NO HURT

## 2024-03-31 ASSESSMENT — PAIN SCALES - GENERAL
PAINLEVEL_OUTOF10: 0
PAINLEVEL_OUTOF10: 7
PAINLEVEL_OUTOF10: 7
PAINLEVEL_OUTOF10: 0

## 2024-03-31 ASSESSMENT — PAIN - FUNCTIONAL ASSESSMENT
PAIN_FUNCTIONAL_ASSESSMENT: PREVENTS OR INTERFERES SOME ACTIVE ACTIVITIES AND ADLS
PAIN_FUNCTIONAL_ASSESSMENT: PREVENTS OR INTERFERES SOME ACTIVE ACTIVITIES AND ADLS

## 2024-03-31 ASSESSMENT — PAIN DESCRIPTION - LOCATION
LOCATION: HEAD
LOCATION: HEAD

## 2024-03-31 ASSESSMENT — PAIN DESCRIPTION - DESCRIPTORS
DESCRIPTORS: POUNDING
DESCRIPTORS: THROBBING

## 2024-03-31 NOTE — PROGRESS NOTES
V2.0    [facility] Progress Note      Name:  Kate Minor /Age/Sex: 1960  (63 y.o. female)   MRN & CSN:  [id number] & [id number] Encounter Date/Time: 3/31/2024 7:52 AM EDT   Location:  [facility]-A PCP: Sharyn Delatorre APRN - CNP     Attending:Venus Marmolejo MD       Hospital Day: 8    Assessment and Recommendations   Kate Minor is a 63 y.o. female with pmh of COPD, hypertension, Salas's esophagus, protein calorie malnutrition  who presents with Acute hypoxemic respiratory failure (HCC)    # Dysphagia likely 2/2 fungal esophagitis: Had difficulty swallowing on 3/27, kept n.p.o., consulted SLP and GI.  Evaluated by GI and recommended EGD tomorrow, continue IV PPI.  Continue to monitor.  -Patient reports that swallowing is significantly improved  -GI team on board-EGD done 3/30/2024-normal esophagus mucosa but some white exudative spots scattered all over consistent with Candida esophagitis; biopsy of midesophagus for eosinophilic esophagitis and Candida taken; stomach showing patches of erythema in the antrum consistent with gastritis/biopsy for H. pylori taken; duodenum reported to be normal  -Diet advanced to regular  -Patient stable for discharge but needs placement    # Sepsis and acute  Respiratory Failure with hypoxia and hypercapnia Secondary to Flu/COPD exacerbation/CAP: Presented with shortness of breath and sore throat, SIRS 4/4 with tachycardia, tachypnea, temperature, elevated WBC lactic acid normal, VBG showed pCO2 55-59, requiring BiPAP, respiratory panel showed influenza A positive, started on ceftriaxone, azithromycin, oseltamivir, obtain blood cultures no growth till date,  procalcitonin 1.93  -wean off O2 as tolerated  -change inhaler to include LABA, LAMA, and ICS - symbicort + tiotropium  -PRN duoneb    # Influenza Positive: Completed Tamiflu and continue prn cough suppressants, throat lonzenge.    # COPD Exacerbation: Continue scheduled bronchodilators and steroid therapy,

## 2024-03-31 NOTE — PROGRESS NOTES
Pulmonary and Critical Care  Progress Note      VITALS:  /65   Pulse 65   Temp 97.9 °F (36.6 °C)   Resp 16   Ht 1.473 m (4' 10\")   Wt 34.8 kg (76 lb 11.5 oz)   SpO2 92%   BMI 16.03 kg/m²     Subjective:   CHIEF COMPLAINT :SOB     HPI:                The patient is a 63 y.o. female is sitting in the bed. She is in mild resp distress    Objective:   PHYSICAL EXAM:    LUNGS:Occasional basal crackles  Abd-soft,BS+,NT  Ext- no pedal edema  CVS-s1s2, no murmurs      DATA:    CBC:  No results for input(s): \"WBC\", \"RBC\", \"HGB\", \"HCT\", \"PLT\", \"MCV\", \"MCH\", \"MCHC\", \"RDW\", \"NRBC\", \"SEGSPCT\", \"BANDSPCT\" in the last 72 hours.   BMP:  Recent Labs     03/29/24  0806      K 4.7   CL 98*   CO2 30   BUN 29*   CREATININE 0.6   CALCIUM 9.0   GLUCOSE 61*      ABG:  No results for input(s): \"PH\", \"PO2ART\", \"TWR8QRE\", \"HCO3\", \"BEART\", \"O2SAT\" in the last 72 hours.  BNP  No results found for: \"BNP\"   D-Dimer:  No results found for: \"DDIMER\"   Radiology: None      Assessment/Plan     Patient Active Problem List    Diagnosis Date Noted    Acute hypoxemic respiratory failure ([facility]) 03/24/2024    NSTEMI (non-ST elevated myocardial infarction) ([facility]) 03/16/2024    COPD exacerbation ([facility]) 02/05/2024    Severe malnutrition ([facility]) 01/24/2024     Class: Chronic    Chronic obstructive pulmonary disease with acute exacerbation ([facility]) 01/23/2024    Coronary artery disease involving native coronary artery of native heart with angina pectoris ([facility]) 01/23/2024     Overview Note:     Coronary anatomy:   The left main coronary artery has no significant disease.      Left anterior descending artery has no significant disease.     Ramus has 60-70% proximal segment stenosis     Circumflex artery has no significant disease 60-70% distal segment stenosis     The right coronary artery is a non-dominant vessel with no significant disease     Left ventriculogram shows ejection fraction of 55%. Normal wall motion.      Chest pain 07/28/2016

## 2024-04-01 LAB
BASE EXCESS MIXED: 10.9 (ref 0–3)
COMMENT: ABNORMAL
HCO3 VENOUS: 41.8 MMOL/L (ref 22–29)
O2 SAT, VEN: 61.8 % (ref 50–70)
PCO2, VEN: 91 MMHG (ref 41–51)
PH VENOUS: 7.27 (ref 7.32–7.43)
PO2, VEN: 36 MMHG (ref 28–48)

## 2024-04-01 PROCEDURE — 6360000002 HC RX W HCPCS: Performed by: INTERNAL MEDICINE

## 2024-04-01 PROCEDURE — 2580000003 HC RX 258: Performed by: INTERNAL MEDICINE

## 2024-04-01 PROCEDURE — 6370000000 HC RX 637 (ALT 250 FOR IP): Performed by: INTERNAL MEDICINE

## 2024-04-01 PROCEDURE — 88342 IMHCHEM/IMCYTCHM 1ST ANTB: CPT | Performed by: PATHOLOGY

## 2024-04-01 PROCEDURE — 94761 N-INVAS EAR/PLS OXIMETRY MLT: CPT

## 2024-04-01 PROCEDURE — 97535 SELF CARE MNGMENT TRAINING: CPT

## 2024-04-01 PROCEDURE — 2700000000 HC OXYGEN THERAPY PER DAY

## 2024-04-01 PROCEDURE — 88305 TISSUE EXAM BY PATHOLOGIST: CPT | Performed by: PATHOLOGY

## 2024-04-01 PROCEDURE — 94640 AIRWAY INHALATION TREATMENT: CPT

## 2024-04-01 PROCEDURE — 1200000000 HC SEMI PRIVATE

## 2024-04-01 PROCEDURE — 82805 BLOOD GASES W/O2 SATURATION: CPT

## 2024-04-01 PROCEDURE — 36415 COLL VENOUS BLD VENIPUNCTURE: CPT

## 2024-04-01 PROCEDURE — 94660 CPAP INITIATION&MGMT: CPT

## 2024-04-01 PROCEDURE — 94150 VITAL CAPACITY TEST: CPT

## 2024-04-01 PROCEDURE — 88312 SPECIAL STAINS GROUP 1: CPT | Performed by: PATHOLOGY

## 2024-04-01 PROCEDURE — 97530 THERAPEUTIC ACTIVITIES: CPT

## 2024-04-01 RX ORDER — LORAZEPAM 0.5 MG/1
0.5 TABLET ORAL EVERY 4 HOURS PRN
Status: DISCONTINUED | OUTPATIENT
Start: 2024-04-01 | End: 2024-04-05

## 2024-04-01 RX ADMIN — RANOLAZINE 500 MG: 500 TABLET, EXTENDED RELEASE ORAL at 09:53

## 2024-04-01 RX ADMIN — LORAZEPAM 0.5 MG: 0.5 TABLET ORAL at 18:18

## 2024-04-01 RX ADMIN — RANOLAZINE 500 MG: 500 TABLET, EXTENDED RELEASE ORAL at 22:21

## 2024-04-01 RX ADMIN — BUDESONIDE AND FORMOTEROL FUMARATE DIHYDRATE 2 PUFF: 160; 4.5 AEROSOL RESPIRATORY (INHALATION) at 21:20

## 2024-04-01 RX ADMIN — BUDESONIDE AND FORMOTEROL FUMARATE DIHYDRATE 2 PUFF: 160; 4.5 AEROSOL RESPIRATORY (INHALATION) at 07:40

## 2024-04-01 RX ADMIN — AMLODIPINE BESYLATE 5 MG: 5 TABLET ORAL at 09:55

## 2024-04-01 RX ADMIN — TIOTROPIUM BROMIDE INHALATION SPRAY 2 PUFF: 3.12 SPRAY, METERED RESPIRATORY (INHALATION) at 07:40

## 2024-04-01 RX ADMIN — FLUCONAZOLE 100 MG: 100 TABLET ORAL at 09:55

## 2024-04-01 RX ADMIN — BUSPIRONE HYDROCHLORIDE 5 MG: 5 TABLET ORAL at 22:21

## 2024-04-01 RX ADMIN — ONDANSETRON 4 MG: 2 INJECTION INTRAMUSCULAR; INTRAVENOUS at 15:15

## 2024-04-01 RX ADMIN — METOPROLOL TARTRATE 25 MG: 25 TABLET, FILM COATED ORAL at 22:21

## 2024-04-01 RX ADMIN — BUSPIRONE HYDROCHLORIDE 5 MG: 5 TABLET ORAL at 09:53

## 2024-04-01 RX ADMIN — LORAZEPAM 0.5 MG: 0.5 TABLET ORAL at 22:27

## 2024-04-01 RX ADMIN — BENZONATATE 100 MG: 100 CAPSULE ORAL at 18:17

## 2024-04-01 RX ADMIN — GABAPENTIN 600 MG: 300 CAPSULE ORAL at 23:44

## 2024-04-01 RX ADMIN — AZITHROMYCIN MONOHYDRATE 500 MG: 500 INJECTION, POWDER, LYOPHILIZED, FOR SOLUTION INTRAVENOUS at 11:55

## 2024-04-01 RX ADMIN — ROSUVASTATIN CALCIUM 20 MG: 20 TABLET, COATED ORAL at 09:55

## 2024-04-01 RX ADMIN — SODIUM CHLORIDE, PRESERVATIVE FREE 10 ML: 5 INJECTION INTRAVENOUS at 22:23

## 2024-04-01 RX ADMIN — CEFTRIAXONE 1000 MG: 1 INJECTION, POWDER, FOR SOLUTION INTRAMUSCULAR; INTRAVENOUS at 11:54

## 2024-04-01 RX ADMIN — METOPROLOL TARTRATE 25 MG: 25 TABLET, FILM COATED ORAL at 09:53

## 2024-04-01 RX ADMIN — NYSTATIN 500000 UNITS: 100000 SUSPENSION ORAL at 09:55

## 2024-04-01 RX ADMIN — PANTOPRAZOLE SODIUM 40 MG: 40 TABLET, DELAYED RELEASE ORAL at 06:36

## 2024-04-01 RX ADMIN — NYSTATIN 500000 UNITS: 100000 SUSPENSION ORAL at 22:22

## 2024-04-01 RX ADMIN — ENOXAPARIN SODIUM 30 MG: 100 INJECTION SUBCUTANEOUS at 09:56

## 2024-04-01 RX ADMIN — POLYETHYLENE GLYCOL 3350 17 G: 17 POWDER, FOR SOLUTION ORAL at 09:56

## 2024-04-01 RX ADMIN — SODIUM CHLORIDE, PRESERVATIVE FREE 10 ML: 5 INJECTION INTRAVENOUS at 09:56

## 2024-04-01 RX ADMIN — IPRATROPIUM BROMIDE AND ALBUTEROL SULFATE 1 DOSE: 2.5; .5 SOLUTION RESPIRATORY (INHALATION) at 14:16

## 2024-04-01 RX ADMIN — NYSTATIN 500000 UNITS: 100000 SUSPENSION ORAL at 18:17

## 2024-04-01 ASSESSMENT — PAIN SCALES - GENERAL
PAINLEVEL_OUTOF10: 0
PAINLEVEL_OUTOF10: 1
PAINLEVEL_OUTOF10: 0

## 2024-04-01 ASSESSMENT — PAIN SCALES - WONG BAKER
WONGBAKER_NUMERICALRESPONSE: NO HURT

## 2024-04-01 NOTE — CONSULTS
Consult completed. Procedure/rationale explained to pt & consent obtained. #22ga [employer] high-pressure-injectable PIV initiated without difficulty/complications. Pt tolerated well and no other c/o or needs noted or reported.

## 2024-04-01 NOTE — CARE COORDINATION
Patient is currently on Med Assist Flagged list.  She received voucher assistance at discharge on 1/25/24.  She needs to do a complete application and provide all required documents to determine eligibility for any further assistance.   [phone]

## 2024-04-01 NOTE — CARE COORDINATION
The FC is working on getting patient Medicaid at this time. Son is going to turn in SS letter which is the only document still needed. Patient is agreeable to SNF.  Sandy discussed [facility]. She has list of other facilities also. Awaiting Medicaid pending number once letter is turned in. CM following.

## 2024-04-01 NOTE — PLAN OF CARE
Problem: Discharge Planning  Goal: Discharge to home or other facility with appropriate resources  Outcome: Progressing     Problem: Respiratory - Adult  Goal: Achieves optimal ventilation and oxygenation  Outcome: Progressing     Problem: Infection - Adult  Goal: Absence of infection at discharge  Outcome: Progressing  Goal: Absence of infection during hospitalization  Outcome: Progressing  Goal: Absence of fever/infection during anticipated neutropenic period  Outcome: Progressing     Problem: Anxiety  Goal: Will report anxiety at manageable levels  Description: INTERVENTIONS:  1. Administer medication as ordered  2. Teach and rehearse alternative coping skills  3. Provide emotional support with 1:1 interaction with staff  Outcome: Progressing     Problem: Coping  Goal: Pt/Family able to verbalize concerns and demonstrate effective coping strategies  Description: INTERVENTIONS:  1. Assist patient/family to identify coping skills, available support systems and cultural and spiritual values  2. Provide emotional support, including active listening and acknowledgement of concerns of patient and caregivers  3. Reduce environmental stimuli, as able  4. Instruct patient/family in relaxation techniques, as appropriate  5. Assess for spiritual pain/suffering and initiate Spiritual Care, Psychosocial Clinical Specialist consults as needed  Outcome: Progressing     Problem: Safety - Adult  Goal: Free from fall injury  Outcome: Progressing     Problem: ABCDS Injury Assessment  Goal: Absence of physical injury  Outcome: Progressing     Problem: Nutrition Deficit:  Goal: Optimize nutritional status  Outcome: Progressing     Problem: Pain  Goal: Verbalizes/displays adequate comfort level or baseline comfort level  Outcome: Progressing     Problem: Neurosensory - Adult  Goal: Achieves stable or improved neurological status  Outcome: Progressing  Goal: Achieves maximal functionality and self care  Outcome: Progressing

## 2024-04-01 NOTE — PROGRESS NOTES
pulmonary      SUBJECTIVE:  slowly improving but weak     OBJECTIVE    VITALS:  BP (!) 132/59   Pulse 66   Temp 98.2 °F (36.8 °C) (Oral)   Resp 18   Ht 1.473 m (4' 10\")   Wt 34.8 kg (76 lb 11.5 oz)   SpO2 92%   BMI 16.03 kg/m²   HEAD AND FACE EXAM:  No throat injection, no active exudate,no thrush  NECK EXAM;No JVD, no masses, symmetrical  CHEST EXAM; Expansion equal and symmetrical, no masses  LUNG EXAM; Good breath sounds bilaterally. There are expiratory wheezes both lungs, there are crackles at both lung bases  CARDIOVASCULAR EXAM: Positive S1 and S2, no S3 or S4, no clicks ,no murmurs  RIGHT AND LEFT LOWER EXTRIMITY EXAM: No edema, no swelling, no inflamation  CNS EXAM: Alert and oriented X3          LABS   Lab Results   Component Value Date    WBC 10.1 03/28/2024    HGB 13.1 03/28/2024    HCT 39.7 03/28/2024    MCV 97.8 03/28/2024     03/28/2024     Lab Results   Component Value Date    CREATININE 0.6 03/29/2024    BUN 29 (H) 03/29/2024     03/29/2024    K 4.7 03/29/2024    CL 98 (L) 03/29/2024    CO2 30 03/29/2024     Lab Results   Component Value Date    INR 1.1 01/23/2024    PROTIME 14.6 (H) 01/23/2024          Lab Results   Component Value Date/Time    PHOS 3.9 02/05/2024 05:22 AM    PHOS 4.2 01/24/2024 03:12 AM      No results for input(s): \"PH\", \"PO2ART\", \"ZFM6NTE\", \"HCO3\", \"BEART\", \"O2SAT\" in the last 72 hours.      Wt Readings from Last 3 Encounters:   03/29/24 34.8 kg (76 lb 11.5 oz)   03/20/24 40.3 kg (88 lb 12.8 oz)   03/16/24 33.9 kg (74 lb 12.8 oz)               ASSESMENT  Ac resp failure  Ac copd  Pos inf A        PLAN  Finished tamiflu  Cpm  Bd rx  O2 adm    4/1/2024  Rafael Fallon MD, MJOSE ENRIQUE.

## 2024-04-01 NOTE — PROGRESS NOTES
Comprehensive Nutrition Assessment    Type and Reason for Visit:  Reassess    Nutrition Recommendations/Plan:   Continue current diet and supplements as ordered  Recommend soft and bite sized meats upon request   Endorse adequate chewing and recommend small sips w/ meals or drinks before and after vs during meals   Consider prokinetic agent, refer to GI   Endorse adequate PO intakes/ONS Intake and record in I/O   RD provided patient with high calorie-high protein education/malnutrition bag      Malnutrition Assessment:  Malnutrition Status:  Severe malnutrition (03/26/24 1225)    Context:  Chronic Illness       Nutrition Assessment:    Pt is eating better on regular diet/thins. Reports eating at least half of her meals. C/o troubles with digestion over the years. C/o food pocketing in middle of throat and early satiety prior to finishing meals. GI is following. Currently c/o gum soreness, finding relief with nystatin. Encourage pt to continue to consume adequately. Provided education at bedside. Follow as moderate nutrition risk.    Nutrition Related Findings:    No new labs. Meds reviewed. Sitting up, feeds self. Recommended GERD precautions for nutrition. Wound Type: None       Current Nutrition Intake & Therapies:    Average Meal Intake: 51-75%  Average Supplements Intake: 51-75%  ADULT DIET; Regular  ADULT ORAL NUTRITION SUPPLEMENT; Breakfast, Lunch, Dinner; Standard High Calorie/High Protein Oral Supplement    Anthropometric Measures:  Height: 147.3 cm (4' 10\")  Ideal Body Weight (IBW): 90 lbs (41 kg)    Admission Body Weight: 33.7 kg (74 lb 4.7 oz)  Current Body Weight: 34.8 kg (76 lb 11.5 oz) (not newly taken), 85.2 % IBW. Weight Source: Bed Scale  Current BMI (kg/m2): 16  Usual Body Weight: 33.4 kg (73 lb 9.4 oz) (3/17/2023)  % Weight Change (Calculated): 1  Weight Adjustment For: No Adjustment                 BMI Categories: Underweight (BMI less than 22) age over 65    Estimated Daily Nutrient  Needs:  Energy Requirements Based On: Kcal/kg  Weight Used for Energy Requirements: Current  Energy (kcal/day): 8779-6611 (35-40 kcals/kg, for weight gain)  Weight Used for Protein Requirements: Current  Protein (g/day): 51-67 (1.5-2 g/kg CBW)  Method Used for Fluid Requirements: 1 ml/kcal  Fluid (ml/day): 1200    Nutrition Diagnosis:   Severe malnutrition, In context of chronic illness related to catabolic illness (reduced oral intakes 2/2 chronic settings of COPD, Pulmonary Emphysema) as evidenced by poor intake prior to admission, severe muscle loss, severe loss of subcutaneous fat    Nutrition Interventions:   Food and/or Nutrient Delivery: Continue Current Diet, Continue Oral Nutrition Supplement  Nutrition Education/Counseling: Survival skills/brief education completed (Malnutrition bag/education given)  Coordination of Nutrition Care: Continue to monitor while inpatient  Plan of Care discussed with: pt, PS MD re: malnutrition dx, hydration IVF, lytes    Goals:  Previous Goal Met: Progressing toward Goal(s)  Goals: Meet at least 75% of estimated needs       Nutrition Monitoring and Evaluation:   Behavioral-Environmental Outcomes: None Identified  Food/Nutrient Intake Outcomes: Food and Nutrient Intake, Supplement Intake, IVF Intake  Physical Signs/Symptoms Outcomes: Biochemical Data, Nutrition Focused Physical Findings, Weight, Nausea or Vomiting, GI Status    Discharge Planning:    Continue Oral Nutrition Supplement, Continue current diet, Coordination of community care (Recommend GERD precautions/meal plan)     Clyde Mckeon RD, LD  Contact: [phone]

## 2024-04-01 NOTE — PROGRESS NOTES
Occupational Therapy Treatment Note  Name: Kate Minor MRN: [id number] :   1960   Date:  2024   Admission Date: 3/24/2024 Room:  [address] [place] [address]-     Primary Problem:  The encounter diagnosis was Dysphagia, unspecified type.  Past Medical History:   Diagnosis Date    Salas's esophagus     Elevated troponin 2024    Emphysema of lung (HCC)     H/O Doppler ultrasound carotid 2023    Carotid Moderate (50-69%) disease of the Right proximal Internal carotid artery. Mild (0-49%) disease of the Left mid Internal carotid artery.    H/O echocardiogram 2023    Echo EF 55-60%. Doppler evaluation reveals mild aortic, mild to moderate mitral, and mild tricuspid regurgitation. Right ventricular systolic pressure of 45 mmHg consistent with mild to moderate pulmonary hypertension    H/O exercise stress test 2016    Good exercise capacity. EF 70% Normal test.    History of Holter monitoring 2023    Normal sinus rhythm with frequent complex supraventricular ectopy and isolated nonsustained 7 beat run of atrial tachycardia.    History of nuclear stress test 2023    NM Normal isotope uptake following exercise and at rest. There is no evidence of exercise induced ischemia.    HLD (hyperlipidemia)     Hypertension     NSTEMI (non-ST elevated myocardial infarction) ([facility]) 2024    Pulmonary emphysema ([facility])     TIA (transient ischemic attack)          Communication with other providers:  RN     Subjective:  Patient states:  \"I just didn't need any of this stuff before I came in here, I was normal like you\"  Pain: denied   Restrictions: general, fall, droplet ISO   RN at bedside    Objective:    Observation:  pt was standing at sink for ADLs without O2NC applied upon arrival, agreeable to session. Upon OT handoff, O2NC and SpO2 monitor applied for the rest of the session. Educated patient on importance of O2NC d/t desat and complications of desat.   Objective Measures:  Pt  demo desat to ~86% after application of O2NC and SpO2 monitor. Pt returned to 91% with prolonged time and cues for PLB. Pt desat with ambulation ~87% and rebounds to 91% with time and cues for PLB.     Treatment, including education:    Therapeutic Activity Training:   Therapeutic activity training was instructed today.  Cues were given for safety, sequence, UE/LE placement, visual cues, and balance.    Activities performed today included:    Standing balance:  Pt demo fair- dynamic standing balance and fair+ static standing balance.    Ambulation:  Pt ambulated 8' + 10' + 10' + 10' + 10' CGA without AD. Pt demo general unsteadiness with x3 minor LOB, self-corrected.     SPT:   Pt completed from EOB and to recliner CGA.     Self Care Training:   Self care training was performed today.  Cues were given for safety, sequence, UE/LE placement, visual cues, and balance.    Activities performed today included:    Grooming:  Pt engaged for several minutes standing sinskide with Supervision.     LB dressing:  Pt donned bilat nonslip socks seated with SetupA.       Education: Role of OT, OT POC, safety, benefits of EOB/OOB activity, rationale for treatment  Safety Measures: Gait belt used for safety of pt and therapist, Left in recliner, Alarm in place, call light and phone within reach, lines managed, needs met     Assessment / Impression:      Patient's tolerance of treatment: fair+   Adverse Reaction: none   Significant change in status and impact:  none   Barriers to improvement: strength, endurance, balance     Plan for Next Session:    Continue OT POC    Time in:  0857  Time out:  0921  Timed treatment minutes:  24  Total treatment time:  24    Electronically signed by:    TC Hubbard/L  License: [id number]  4/1/2024, 1:09 PM

## 2024-04-02 ENCOUNTER — APPOINTMENT (OUTPATIENT)
Dept: CT IMAGING | Age: 64
End: 2024-04-02
Attending: INTERNAL MEDICINE
Payer: MEDICAID

## 2024-04-02 PROCEDURE — 94660 CPAP INITIATION&MGMT: CPT

## 2024-04-02 PROCEDURE — 2700000000 HC OXYGEN THERAPY PER DAY

## 2024-04-02 PROCEDURE — 2580000003 HC RX 258: Performed by: INTERNAL MEDICINE

## 2024-04-02 PROCEDURE — 6370000000 HC RX 637 (ALT 250 FOR IP): Performed by: INTERNAL MEDICINE

## 2024-04-02 PROCEDURE — 94761 N-INVAS EAR/PLS OXIMETRY MLT: CPT

## 2024-04-02 PROCEDURE — 6360000002 HC RX W HCPCS: Performed by: INTERNAL MEDICINE

## 2024-04-02 PROCEDURE — 1200000000 HC SEMI PRIVATE

## 2024-04-02 PROCEDURE — 94640 AIRWAY INHALATION TREATMENT: CPT

## 2024-04-02 PROCEDURE — 71250 CT THORAX DX C-: CPT

## 2024-04-02 RX ADMIN — ENOXAPARIN SODIUM 30 MG: 100 INJECTION SUBCUTANEOUS at 10:29

## 2024-04-02 RX ADMIN — NYSTATIN 500000 UNITS: 100000 SUSPENSION ORAL at 10:28

## 2024-04-02 RX ADMIN — IPRATROPIUM BROMIDE AND ALBUTEROL SULFATE 1 DOSE: 2.5; .5 SOLUTION RESPIRATORY (INHALATION) at 08:23

## 2024-04-02 RX ADMIN — METOPROLOL TARTRATE 25 MG: 25 TABLET, FILM COATED ORAL at 10:27

## 2024-04-02 RX ADMIN — SENNOSIDES 8.6 MG: 8.6 TABLET, FILM COATED ORAL at 20:30

## 2024-04-02 RX ADMIN — BUDESONIDE AND FORMOTEROL FUMARATE DIHYDRATE 2 PUFF: 160; 4.5 AEROSOL RESPIRATORY (INHALATION) at 08:23

## 2024-04-02 RX ADMIN — AMLODIPINE BESYLATE 5 MG: 5 TABLET ORAL at 10:28

## 2024-04-02 RX ADMIN — RANOLAZINE 500 MG: 500 TABLET, EXTENDED RELEASE ORAL at 20:30

## 2024-04-02 RX ADMIN — ROSUVASTATIN CALCIUM 20 MG: 20 TABLET, COATED ORAL at 10:28

## 2024-04-02 RX ADMIN — PANTOPRAZOLE SODIUM 40 MG: 40 TABLET, DELAYED RELEASE ORAL at 05:49

## 2024-04-02 RX ADMIN — RANOLAZINE 500 MG: 500 TABLET, EXTENDED RELEASE ORAL at 10:27

## 2024-04-02 RX ADMIN — NYSTATIN 500000 UNITS: 100000 SUSPENSION ORAL at 13:14

## 2024-04-02 RX ADMIN — BUDESONIDE AND FORMOTEROL FUMARATE DIHYDRATE 2 PUFF: 160; 4.5 AEROSOL RESPIRATORY (INHALATION) at 20:48

## 2024-04-02 RX ADMIN — IPRATROPIUM BROMIDE AND ALBUTEROL SULFATE 1 DOSE: 2.5; .5 SOLUTION RESPIRATORY (INHALATION) at 20:40

## 2024-04-02 RX ADMIN — ACETAMINOPHEN 650 MG: 325 TABLET ORAL at 20:30

## 2024-04-02 RX ADMIN — METOPROLOL TARTRATE 25 MG: 25 TABLET, FILM COATED ORAL at 20:30

## 2024-04-02 RX ADMIN — NYSTATIN 500000 UNITS: 100000 SUSPENSION ORAL at 20:30

## 2024-04-02 RX ADMIN — BUSPIRONE HYDROCHLORIDE 5 MG: 5 TABLET ORAL at 10:27

## 2024-04-02 RX ADMIN — FLUCONAZOLE 100 MG: 100 TABLET ORAL at 10:28

## 2024-04-02 RX ADMIN — SODIUM CHLORIDE, PRESERVATIVE FREE 10 ML: 5 INJECTION INTRAVENOUS at 10:27

## 2024-04-02 RX ADMIN — BUSPIRONE HYDROCHLORIDE 5 MG: 5 TABLET ORAL at 20:30

## 2024-04-02 RX ADMIN — SODIUM CHLORIDE, PRESERVATIVE FREE 10 ML: 5 INJECTION INTRAVENOUS at 20:31

## 2024-04-02 RX ADMIN — LORAZEPAM 0.5 MG: 0.5 TABLET ORAL at 10:55

## 2024-04-02 ASSESSMENT — PAIN DESCRIPTION - PAIN TYPE: TYPE: ACUTE PAIN

## 2024-04-02 ASSESSMENT — PAIN SCALES - WONG BAKER
WONGBAKER_NUMERICALRESPONSE: NO HURT
WONGBAKER_NUMERICALRESPONSE: HURTS A LITTLE BIT
WONGBAKER_NUMERICALRESPONSE: NO HURT

## 2024-04-02 ASSESSMENT — PAIN DESCRIPTION - ORIENTATION
ORIENTATION: ANTERIOR
ORIENTATION: ANTERIOR

## 2024-04-02 ASSESSMENT — PAIN SCALES - GENERAL
PAINLEVEL_OUTOF10: 7
PAINLEVEL_OUTOF10: 4

## 2024-04-02 ASSESSMENT — PAIN DESCRIPTION - FREQUENCY
FREQUENCY: INTERMITTENT
FREQUENCY: INTERMITTENT

## 2024-04-02 ASSESSMENT — PAIN DESCRIPTION - DESCRIPTORS
DESCRIPTORS: THROBBING
DESCRIPTORS: THROBBING

## 2024-04-02 ASSESSMENT — PAIN DESCRIPTION - LOCATION
LOCATION: HEAD
LOCATION: HEAD

## 2024-04-02 NOTE — PLAN OF CARE
Problem: Discharge Planning  Goal: Discharge to home or other facility with appropriate resources  4/2/2024 1038 by David Rivera LPN  Outcome: Progressing  4/2/2024 0358 by Mj Jordan RN  Outcome: Progressing     Problem: Respiratory - Adult  Goal: Achieves optimal ventilation and oxygenation  4/2/2024 1038 by David Rivera LPN  Outcome: Progressing  4/2/2024 0358 by Mj Jordan RN  Outcome: Not Progressing     Problem: Infection - Adult  Goal: Absence of infection at discharge  4/2/2024 1038 by David Rivera LPN  Outcome: Progressing  4/2/2024 0358 by Mj Jordan RN  Outcome: Progressing  Goal: Absence of infection during hospitalization  4/2/2024 1038 by David Rivera LPN  Outcome: Progressing  4/2/2024 0358 by Mj Jordan RN  Outcome: Progressing  Goal: Absence of fever/infection during anticipated neutropenic period  4/2/2024 1038 by David Rivera LPN  Outcome: Progressing  4/2/2024 0358 by Mj Jordan RN  Outcome: Progressing     Problem: Anxiety  Goal: Will report anxiety at manageable levels  Description: INTERVENTIONS:  1. Administer medication as ordered  2. Teach and rehearse alternative coping skills  3. Provide emotional support with 1:1 interaction with staff  4/2/2024 1038 by David Rivera LPN  Outcome: Progressing  4/2/2024 0358 by Mj Jordan RN  Outcome: Progressing     Problem: Coping  Goal: Pt/Family able to verbalize concerns and demonstrate effective coping strategies  Description: INTERVENTIONS:  1. Assist patient/family to identify coping skills, available support systems and cultural and spiritual values  2. Provide emotional support, including active listening and acknowledgement of concerns of patient and caregivers  3. Reduce environmental stimuli, as able  4. Instruct patient/family in relaxation techniques, as appropriate  5. Assess for spiritual pain/suffering and initiate Spiritual Care, Psychosocial Clinical Specialist

## 2024-04-02 NOTE — CARE COORDINATION
Chart reviewed and met w/ pt for continued discharge planning. Pt reports that [employer] turned in the needed income information yesterday for patients medicaid application. Pt stated that she has not picked a SNF yet as her children have not came to discuss the list with her.     9:21 AM Message to Angelina, financial counselor, to check on medicaid application. Per Angelina, the needed SS information has not been turned in. Angelina to reach out to [employer] for required information.

## 2024-04-02 NOTE — PROGRESS NOTES
V2.0    [facility] Progress Note      Name:  Kate Minor /Age/Sex: 1960  (63 y.o. female)   MRN & CSN:  [id number] & [id number] Encounter Date/Time: 2024 7:52 AM EDT   [facility] PCP: Sharyn Delatorre APRN - CNP     Attending:Venus Marmolejo MD       Hospital Day: 10    Assessment and Recommendations   Kate Minor is a 63 y.o. female with pmh of COPD, hypertension, Salas's esophagus, protein calorie malnutrition  who presents with Acute hypoxemic respiratory failure (HCC)    # Dysphagia likely 2/2 fungal esophagitis: Had difficulty swallowing on 3/27, kept n.p.o., consulted SLP and GI.  Evaluated by GI and recommended EGD tomorrow, continue IV PPI.  Continue to monitor.  -Patient reports that swallowing is significantly improved  -GI team on board-EGD done 3/30/2024-normal esophagus mucosa but some white exudative spots scattered all over consistent with Candida esophagitis; biopsy of midesophagus for eosinophilic esophagitis and Candida taken; stomach showing patches of erythema in the antrum consistent with gastritis/biopsy for H. pylori taken; duodenum reported to be normal  -Diet advanced to regular  -Patient stable for discharge but needs placement    # Sepsis and acute  Respiratory Failure with hypoxia and hypercapnia Secondary to Flu/COPD exacerbation/CAP: Presented with shortness of breath and sore throat, SIRS 4/4 with tachycardia, tachypnea, temperature, elevated WBC lactic acid normal, VBG showed pCO2 55-59, requiring BiPAP, respiratory panel showed influenza A positive, started on ceftriaxone, azithromycin, oseltamivir, obtain blood cultures no growth till date,  procalcitonin 1.93  -wean off O2 as tolerated  -change inhaler to include LABA, LAMA, and ICS - symbicort + tiotropium  -PRN duoneb    # Influenza Positive: Completed Tamiflu and continue prn cough suppressants, throat lonzenge.    # COPD Exacerbation: Continue scheduled bronchodilators and steroid therapy,

## 2024-04-02 NOTE — PROGRESS NOTES
pulmonary      SUBJECTIVE:  seen early am and sleeping and no sob     OBJECTIVE    VITALS:  BP (!) 165/64   Pulse 65   Temp 98.1 °F (36.7 °C) (Oral)   Resp 24   Ht 1.473 m (4' 10\")   Wt 34.8 kg (76 lb 11.5 oz)   SpO2 94%   BMI 16.03 kg/m²   HEAD AND FACE EXAM:  No throat injection, no active exudate,no thrush  NECK EXAM;No JVD, no masses, symmetrical  CHEST EXAM; Expansion equal and symmetrical, no masses  LUNG EXAM; Good breath sounds bilaterally. There are expiratory wheezes both lungs, there are crackles at both lung bases  CARDIOVASCULAR EXAM: Positive S1 and S2, no S3 or S4, no clicks ,no murmurs  RIGHT AND LEFT LOWER EXTRIMITY EXAM: No edema, no swelling, no           LABS   Lab Results   Component Value Date    WBC 10.1 03/28/2024    HGB 13.1 03/28/2024    HCT 39.7 03/28/2024    MCV 97.8 03/28/2024     03/28/2024     Lab Results   Component Value Date    CREATININE 0.6 03/29/2024    BUN 29 (H) 03/29/2024     03/29/2024    K 4.7 03/29/2024    CL 98 (L) 03/29/2024    CO2 30 03/29/2024     Lab Results   Component Value Date    INR 1.1 01/23/2024    PROTIME 14.6 (H) 01/23/2024          Lab Results   Component Value Date/Time    PHOS 3.9 02/05/2024 05:22 AM    PHOS 4.2 01/24/2024 03:12 AM      No results for input(s): \"PH\", \"PO2ART\", \"IVI5YEL\", \"HCO3\", \"BEART\", \"O2SAT\" in the last 72 hours.      Wt Readings from Last 3 Encounters:   03/29/24 34.8 kg (76 lb 11.5 oz)   03/20/24 40.3 kg (88 lb 12.8 oz)   03/16/24 33.9 kg (74 lb 12.8 oz)               ASSESMENT  Ac resp failure  Ac copd  Positive inf A  Poss pneumonia        PLAN  Tamiflu course finished  Antibx  Bd rx    4/2/2024  Rafael Fallon MD, M.D.

## 2024-04-03 PROCEDURE — 94660 CPAP INITIATION&MGMT: CPT

## 2024-04-03 PROCEDURE — 94640 AIRWAY INHALATION TREATMENT: CPT

## 2024-04-03 PROCEDURE — 6370000000 HC RX 637 (ALT 250 FOR IP): Performed by: INTERNAL MEDICINE

## 2024-04-03 PROCEDURE — 94150 VITAL CAPACITY TEST: CPT

## 2024-04-03 PROCEDURE — 2580000003 HC RX 258: Performed by: INTERNAL MEDICINE

## 2024-04-03 PROCEDURE — 94761 N-INVAS EAR/PLS OXIMETRY MLT: CPT

## 2024-04-03 PROCEDURE — 1200000000 HC SEMI PRIVATE

## 2024-04-03 PROCEDURE — 2700000000 HC OXYGEN THERAPY PER DAY

## 2024-04-03 PROCEDURE — 6360000002 HC RX W HCPCS: Performed by: INTERNAL MEDICINE

## 2024-04-03 PROCEDURE — 94618 PULMONARY STRESS TESTING: CPT

## 2024-04-03 RX ADMIN — ACETAMINOPHEN 650 MG: 325 TABLET ORAL at 19:58

## 2024-04-03 RX ADMIN — BUSPIRONE HYDROCHLORIDE 5 MG: 5 TABLET ORAL at 19:59

## 2024-04-03 RX ADMIN — BUSPIRONE HYDROCHLORIDE 5 MG: 5 TABLET ORAL at 10:03

## 2024-04-03 RX ADMIN — RANOLAZINE 500 MG: 500 TABLET, EXTENDED RELEASE ORAL at 10:03

## 2024-04-03 RX ADMIN — LORAZEPAM 0.5 MG: 0.5 TABLET ORAL at 10:29

## 2024-04-03 RX ADMIN — GABAPENTIN 600 MG: 300 CAPSULE ORAL at 22:59

## 2024-04-03 RX ADMIN — ENOXAPARIN SODIUM 30 MG: 100 INJECTION SUBCUTANEOUS at 10:04

## 2024-04-03 RX ADMIN — TIOTROPIUM BROMIDE INHALATION SPRAY 2 PUFF: 3.12 SPRAY, METERED RESPIRATORY (INHALATION) at 12:31

## 2024-04-03 RX ADMIN — BUDESONIDE AND FORMOTEROL FUMARATE DIHYDRATE 2 PUFF: 160; 4.5 AEROSOL RESPIRATORY (INHALATION) at 20:42

## 2024-04-03 RX ADMIN — NYSTATIN 500000 UNITS: 100000 SUSPENSION ORAL at 15:18

## 2024-04-03 RX ADMIN — SODIUM CHLORIDE, PRESERVATIVE FREE 10 ML: 5 INJECTION INTRAVENOUS at 20:00

## 2024-04-03 RX ADMIN — LORAZEPAM 0.5 MG: 0.5 TABLET ORAL at 19:59

## 2024-04-03 RX ADMIN — SODIUM CHLORIDE, PRESERVATIVE FREE 10 ML: 5 INJECTION INTRAVENOUS at 10:05

## 2024-04-03 RX ADMIN — RANOLAZINE 500 MG: 500 TABLET, EXTENDED RELEASE ORAL at 19:59

## 2024-04-03 RX ADMIN — NYSTATIN 500000 UNITS: 100000 SUSPENSION ORAL at 10:03

## 2024-04-03 RX ADMIN — AMLODIPINE BESYLATE 5 MG: 5 TABLET ORAL at 10:02

## 2024-04-03 RX ADMIN — GABAPENTIN 600 MG: 300 CAPSULE ORAL at 00:04

## 2024-04-03 RX ADMIN — ACETAMINOPHEN 650 MG: 325 TABLET ORAL at 02:44

## 2024-04-03 RX ADMIN — IPRATROPIUM BROMIDE AND ALBUTEROL SULFATE 1 DOSE: 2.5; .5 SOLUTION RESPIRATORY (INHALATION) at 20:35

## 2024-04-03 RX ADMIN — ROSUVASTATIN CALCIUM 20 MG: 20 TABLET, COATED ORAL at 10:02

## 2024-04-03 RX ADMIN — METOPROLOL TARTRATE 25 MG: 25 TABLET, FILM COATED ORAL at 19:59

## 2024-04-03 RX ADMIN — FLUCONAZOLE 100 MG: 100 TABLET ORAL at 10:02

## 2024-04-03 RX ADMIN — PANTOPRAZOLE SODIUM 40 MG: 40 TABLET, DELAYED RELEASE ORAL at 06:35

## 2024-04-03 RX ADMIN — BUDESONIDE AND FORMOTEROL FUMARATE DIHYDRATE 2 PUFF: 160; 4.5 AEROSOL RESPIRATORY (INHALATION) at 12:31

## 2024-04-03 RX ADMIN — NYSTATIN 500000 UNITS: 100000 SUSPENSION ORAL at 19:59

## 2024-04-03 RX ADMIN — METOPROLOL TARTRATE 25 MG: 25 TABLET, FILM COATED ORAL at 10:03

## 2024-04-03 RX ADMIN — BENZONATATE 100 MG: 100 CAPSULE ORAL at 10:29

## 2024-04-03 ASSESSMENT — PAIN SCALES - WONG BAKER
WONGBAKER_NUMERICALRESPONSE: HURTS A LITTLE BIT
WONGBAKER_NUMERICALRESPONSE: NO HURT
WONGBAKER_NUMERICALRESPONSE: NO HURT
WONGBAKER_NUMERICALRESPONSE: HURTS A LITTLE BIT
WONGBAKER_NUMERICALRESPONSE: NO HURT

## 2024-04-03 ASSESSMENT — PAIN SCALES - GENERAL
PAINLEVEL_OUTOF10: 0
PAINLEVEL_OUTOF10: 6
PAINLEVEL_OUTOF10: 3
PAINLEVEL_OUTOF10: 0
PAINLEVEL_OUTOF10: 0
PAINLEVEL_OUTOF10: 2
PAINLEVEL_OUTOF10: 2

## 2024-04-03 ASSESSMENT — PAIN DESCRIPTION - LOCATION
LOCATION: HEAD
LOCATION: HEAD

## 2024-04-03 ASSESSMENT — PAIN DESCRIPTION - FREQUENCY: FREQUENCY: INTERMITTENT

## 2024-04-03 ASSESSMENT — PAIN DESCRIPTION - DESCRIPTORS
DESCRIPTORS: THROBBING
DESCRIPTORS: ACHING

## 2024-04-03 ASSESSMENT — PAIN DESCRIPTION - ORIENTATION: ORIENTATION: ANTERIOR

## 2024-04-03 ASSESSMENT — PAIN - FUNCTIONAL ASSESSMENT: PAIN_FUNCTIONAL_ASSESSMENT: PREVENTS OR INTERFERES SOME ACTIVE ACTIVITIES AND ADLS

## 2024-04-03 NOTE — PROGRESS NOTES
4/3/2024 3:40 PM  Patient Room #: 1118/1118-A  Patient Name: Kate Minor    (Step 1 Done by RN if possible otherwise call Pulmonary Diagnostics)  Place patient on room air at rest for at least 30 minutes.  If patient falls below 88% before 30 minutes then you can record the level and stop.  Record room air saturation level _88_ %.  If patient is at 88% or below, they will qualify for home oxygen and you can stop.  If level does not fall below 88%, fill in level above. If indicated continue to Step 2.   Signature:_Caio Rubio RRT____ Date: _04/03/2024__  (Step 2&3 Done by P)  Ambulate patient on room air until saturation falls below 89%.  Record level of room air saturation with ambulation___ %.  Next, place patient back on ___lpm oxygen and ambulate, record level __%.  (Note:  this level must show improvement from room air level done with ambulation.)  If patient’s saturation on room air with ambulation is 88% or below AND patient shows improvement with oxygen during ambulation, they will qualify for home oxygen and you can stop.  If patient does not drop below 89%, then patient should have an overnight oximetry trending on room air to see if level falls below 88%.  Complete level in Step 3 below.    Room air overnight oximetry level 88 % for___  cumulative minutes.  If patient’s room air oxygen level is below <89% for any amount of time they will qualify for nocturnal home oxygen.        (Attach Night Trending Report)    Complete order below: Diagnosis:_COPD___  Home oxygen at:  Length of Need: ?X Lifetime ? 3 Months     _3__lpm or __%   via  [x] nasal cannula  []mask  [] other         [x]continuous [x]  with activity  [x]  Nocturnal   [x] Portable Tanks [x]  Concentrator  [x] Conserving Device        Therapist Signature:_Caio Rubio RRT______     Date:  _04/04/2024__  Physician Signature:  __Electronically Signed in EMR_    Date:___  Physician Printed Name:  ____Grisel Neely  MD CHRIS  NPI:  [id number] __    [x] Patient Qualifies      [] Patient Does NOT qualify

## 2024-04-03 NOTE — PLAN OF CARE
Problem: Discharge Planning  Goal: Discharge to home or other facility with appropriate resources  4/2/2024 2321 by Manuel Lee RN  Outcome: Progressing  4/2/2024 1038 by David Rivera LPN  Outcome: Progressing     Problem: Respiratory - Adult  Goal: Achieves optimal ventilation and oxygenation  4/2/2024 2321 by Manuel Lee RN  Outcome: Progressing  4/2/2024 1038 by David Rivera LPN  Outcome: Progressing     Problem: Infection - Adult  Goal: Absence of infection at discharge  4/2/2024 2321 by Manuel Lee RN  Outcome: Progressing  4/2/2024 1038 by David Rivera LPN  Outcome: Progressing  Goal: Absence of infection during hospitalization  4/2/2024 2321 by Manuel Lee RN  Outcome: Progressing  4/2/2024 1038 by David Rivera LPN  Outcome: Progressing  Goal: Absence of fever/infection during anticipated neutropenic period  4/2/2024 2321 by Manuel Lee RN  Outcome: Progressing  4/2/2024 1038 by David Rivera LPN  Outcome: Progressing     Problem: Neurosensory - Adult  Goal: Achieves stable or improved neurological status  4/2/2024 2321 by Manuel Lee RN  Outcome: Progressing  4/2/2024 1038 by David Rivera LPN  Outcome: Progressing  Goal: Achieves maximal functionality and self care  4/2/2024 2321 by Manuel Lee RN  Outcome: Progressing  4/2/2024 1038 by David Rivera LPN  Outcome: Progressing     Problem: Cardiovascular - Adult  Goal: Maintains optimal cardiac output and hemodynamic stability  4/2/2024 2321 by Manuel Lee RN  Outcome: Progressing  4/2/2024 1038 by David Rivera LPN  Outcome: Progressing  Goal: Absence of cardiac dysrhythmias or at baseline  4/2/2024 2321 by Manuel Lee RN  Outcome: Progressing  4/2/2024 1038 by David Rivera LPN  Outcome: Progressing     Problem: Skin/Tissue Integrity - Adult  Goal: Skin integrity remains intact  4/2/2024 2321 by Manuel Lee RN  Outcome: Progressing  4/2/2024 1038 by David Rivera LPN  Outcome:

## 2024-04-03 NOTE — PROGRESS NOTES
note for documentation of the administered medications Complications:        -  No immediate complications. Estimated Blood Loss:        -  Estimated blood loss was minimal. Procedure:        - The scope was introduced through the mouth and advanced to the second part           of the duodenum.        -  The upper GI endoscopy was accomplished without difficulty.        -  The patient tolerated the procedure well. Findings:        -  Patchy, white plaques were found in the entire esophagus.  Biopsies were           taken with a cold forceps for histology.        -  Patchy moderate inflammation characterized by erythema was found in the           gastric antrum.  Biopsies were taken with a cold forceps for histology.           histology        -  No other significant abnormalities were identified in a careful examination           of the stomach.        -  The examined duodenum was normal. Impression:        -  Esophageal plaques were found, suspicious for candidiasis.  Biopsied.        -  Acute gastritis, characterized by erythema.  Biopsied.        -  Normal examined duodenum. Recommendation:        -  Resume regular diet today.        -  Continue present medications. Procedure Code(s):        - 26758, Esophagogastroduodenoscopy, flexible, transoral; with biopsy, single           or multiple Diagnosis Code(s):        - R13.14, Dysphagia, pharyngoesophageal phase        - K22.9, Disease of esophagus, unspecified        - K29.00, Acute gastritis without bleeding       CPT(R) - 2023 copyright [employer]. All Rights Reserved.       The CPT codes, CCI edits and ICD codes generated are intended as suggestions       and were generated based on input data.  These codes are preliminary and upon        review may be revised to meet current compliance and payer requirements.       The provider is responsible for the final determination of appropriate codes,       and modifiers. Scope Withdrawal Time:     02/22/2017 03:37 PM    BLOODU NEGATIVE 03/16/2024 04:15 AM    GLUCOSEU Negative 02/22/2017 03:37 PM    KETUA NEGATIVE 03/16/2024 04:15 AM     Urine Cultures: No results found for: \"LABURIN\"  Blood Cultures: No results found for: \"BC\"  No results found for: \"BLOODCULT2\"  Organism: No results found for: \"ORG\"      Electronically signed by Grisel Neely MD on 4/3/2024 at 8:13 AM

## 2024-04-03 NOTE — PROGRESS NOTES
pulmonary      SUBJECTIVE:  she still develop sob with activity     OBJECTIVE    VITALS:  BP (!) 129/49   Pulse 67   Temp 97.8 °F (36.6 °C) (Oral)   Resp 19   Ht 1.473 m (4' 10\")   Wt 34.8 kg (76 lb 11.5 oz)   SpO2 93%   BMI 16.03 kg/m²   HEAD AND FACE EXAM:  No throat injection, no active exudate,no thrush  NECK EXAM;No JVD, no masses, symmetrical  CHEST EXAM; Expansion equal and symmetrical, no masses  LUNG EXAM; Good breath sounds bilaterally. There are expiratory wheezes both lungs, there are crackles at both lung bases  CARDIOVASCULAR EXAM: Positive S1 and S2, no S3 or S4, no clicks ,no murmurs  RIGHT AND LEFT LOWER EXTRIMITY EXAM: No edema, no swelling, no inflamation  CNS EXAM: Alert and oriented X3          LABS   Lab Results   Component Value Date    WBC 10.1 03/28/2024    HGB 13.1 03/28/2024    HCT 39.7 03/28/2024    MCV 97.8 03/28/2024     03/28/2024     Lab Results   Component Value Date    CREATININE 0.6 03/29/2024    BUN 29 (H) 03/29/2024     03/29/2024    K 4.7 03/29/2024    CL 98 (L) 03/29/2024    CO2 30 03/29/2024     Lab Results   Component Value Date    INR 1.1 01/23/2024    PROTIME 14.6 (H) 01/23/2024          Lab Results   Component Value Date/Time    PHOS 3.9 02/05/2024 05:22 AM    PHOS 4.2 01/24/2024 03:12 AM      No results for input(s): \"PH\", \"PO2ART\", \"FXS5KVK\", \"HCO3\", \"BEART\", \"O2SAT\" in the last 72 hours.      Wt Readings from Last 3 Encounters:   03/29/24 34.8 kg (76 lb 11.5 oz)   03/20/24 40.3 kg (88 lb 12.8 oz)   03/16/24 33.9 kg (74 lb 12.8 oz)               ASSESMENT  Ac resp failure  Ac copd  Pos inf A  Poss pneumonia        PLAN  Received tamiflu  Received antibx  Bd rx  O2 adm    4/3/2024  Rafael Fallon MD, M.D.

## 2024-04-04 LAB
BASE EXCESS MIXED: 11 (ref 0–3)
COMMENT: ABNORMAL
HCO3 VENOUS: 39.5 MMOL/L (ref 22–29)
O2 SAT, VEN: 86.1 % (ref 50–70)
PCO2, VEN: 70 MMHG (ref 41–51)
PH VENOUS: 7.36 (ref 7.32–7.43)
PO2, VEN: 54 MMHG (ref 28–48)

## 2024-04-04 PROCEDURE — 97110 THERAPEUTIC EXERCISES: CPT

## 2024-04-04 PROCEDURE — 6370000000 HC RX 637 (ALT 250 FOR IP): Performed by: INTERNAL MEDICINE

## 2024-04-04 PROCEDURE — 1200000000 HC SEMI PRIVATE

## 2024-04-04 PROCEDURE — 2580000003 HC RX 258: Performed by: INTERNAL MEDICINE

## 2024-04-04 PROCEDURE — 94640 AIRWAY INHALATION TREATMENT: CPT

## 2024-04-04 PROCEDURE — 94660 CPAP INITIATION&MGMT: CPT

## 2024-04-04 PROCEDURE — 97530 THERAPEUTIC ACTIVITIES: CPT

## 2024-04-04 PROCEDURE — 97535 SELF CARE MNGMENT TRAINING: CPT

## 2024-04-04 PROCEDURE — 36415 COLL VENOUS BLD VENIPUNCTURE: CPT

## 2024-04-04 PROCEDURE — 82805 BLOOD GASES W/O2 SATURATION: CPT

## 2024-04-04 PROCEDURE — 6360000002 HC RX W HCPCS: Performed by: INTERNAL MEDICINE

## 2024-04-04 RX ORDER — FLUCONAZOLE 100 MG/1
200 TABLET ORAL DAILY
Qty: 18 TABLET | Refills: 0 | Status: SHIPPED | OUTPATIENT
Start: 2024-04-05 | End: 2024-04-14

## 2024-04-04 RX ORDER — BENZONATATE 100 MG/1
100 CAPSULE ORAL 3 TIMES DAILY PRN
Qty: 21 CAPSULE | Refills: 0 | Status: SHIPPED | OUTPATIENT
Start: 2024-04-04 | End: 2024-04-11

## 2024-04-04 RX ADMIN — BUDESONIDE AND FORMOTEROL FUMARATE DIHYDRATE 2 PUFF: 160; 4.5 AEROSOL RESPIRATORY (INHALATION) at 23:24

## 2024-04-04 RX ADMIN — BUSPIRONE HYDROCHLORIDE 5 MG: 5 TABLET ORAL at 20:59

## 2024-04-04 RX ADMIN — AMLODIPINE BESYLATE 5 MG: 5 TABLET ORAL at 09:49

## 2024-04-04 RX ADMIN — METOPROLOL TARTRATE 25 MG: 25 TABLET, FILM COATED ORAL at 10:05

## 2024-04-04 RX ADMIN — ROSUVASTATIN CALCIUM 20 MG: 20 TABLET, COATED ORAL at 10:12

## 2024-04-04 RX ADMIN — BUDESONIDE AND FORMOTEROL FUMARATE DIHYDRATE 2 PUFF: 160; 4.5 AEROSOL RESPIRATORY (INHALATION) at 07:43

## 2024-04-04 RX ADMIN — PANTOPRAZOLE SODIUM 40 MG: 40 TABLET, DELAYED RELEASE ORAL at 05:00

## 2024-04-04 RX ADMIN — NYSTATIN 500000 UNITS: 100000 SUSPENSION ORAL at 17:44

## 2024-04-04 RX ADMIN — GABAPENTIN 600 MG: 300 CAPSULE ORAL at 23:16

## 2024-04-04 RX ADMIN — SENNOSIDES 8.6 MG: 8.6 TABLET, FILM COATED ORAL at 20:59

## 2024-04-04 RX ADMIN — RANOLAZINE 500 MG: 500 TABLET, EXTENDED RELEASE ORAL at 10:05

## 2024-04-04 RX ADMIN — ONDANSETRON 4 MG: 4 TABLET, ORALLY DISINTEGRATING ORAL at 17:47

## 2024-04-04 RX ADMIN — LORAZEPAM 0.5 MG: 0.5 TABLET ORAL at 04:54

## 2024-04-04 RX ADMIN — NYSTATIN 500000 UNITS: 100000 SUSPENSION ORAL at 13:30

## 2024-04-04 RX ADMIN — NYSTATIN 500000 UNITS: 100000 SUSPENSION ORAL at 10:05

## 2024-04-04 RX ADMIN — METOPROLOL TARTRATE 25 MG: 25 TABLET, FILM COATED ORAL at 20:59

## 2024-04-04 RX ADMIN — FLUCONAZOLE 100 MG: 100 TABLET ORAL at 09:49

## 2024-04-04 RX ADMIN — RANOLAZINE 500 MG: 500 TABLET, EXTENDED RELEASE ORAL at 20:59

## 2024-04-04 RX ADMIN — BENZONATATE 100 MG: 100 CAPSULE ORAL at 04:54

## 2024-04-04 RX ADMIN — TIOTROPIUM BROMIDE INHALATION SPRAY 2 PUFF: 3.12 SPRAY, METERED RESPIRATORY (INHALATION) at 07:44

## 2024-04-04 RX ADMIN — ACETAMINOPHEN 650 MG: 325 TABLET ORAL at 20:59

## 2024-04-04 RX ADMIN — IPRATROPIUM BROMIDE AND ALBUTEROL SULFATE 1 DOSE: 2.5; .5 SOLUTION RESPIRATORY (INHALATION) at 23:15

## 2024-04-04 RX ADMIN — BUSPIRONE HYDROCHLORIDE 5 MG: 5 TABLET ORAL at 09:49

## 2024-04-04 RX ADMIN — BENZONATATE 100 MG: 100 CAPSULE ORAL at 23:34

## 2024-04-04 RX ADMIN — ACETAMINOPHEN 650 MG: 325 TABLET ORAL at 00:55

## 2024-04-04 RX ADMIN — LORAZEPAM 0.5 MG: 0.5 TABLET ORAL at 23:34

## 2024-04-04 RX ADMIN — NYSTATIN 500000 UNITS: 100000 SUSPENSION ORAL at 20:59

## 2024-04-04 RX ADMIN — ENOXAPARIN SODIUM 30 MG: 100 INJECTION SUBCUTANEOUS at 09:50

## 2024-04-04 RX ADMIN — SODIUM CHLORIDE, PRESERVATIVE FREE 10 ML: 5 INJECTION INTRAVENOUS at 20:59

## 2024-04-04 ASSESSMENT — PAIN DESCRIPTION - LOCATION: LOCATION: HEAD

## 2024-04-04 ASSESSMENT — PAIN SCALES - GENERAL
PAINLEVEL_OUTOF10: 2
PAINLEVEL_OUTOF10: 0
PAINLEVEL_OUTOF10: 4

## 2024-04-04 ASSESSMENT — PAIN DESCRIPTION - DESCRIPTORS: DESCRIPTORS: ACHING

## 2024-04-04 ASSESSMENT — PAIN SCALES - WONG BAKER
WONGBAKER_NUMERICALRESPONSE: HURTS A LITTLE BIT
WONGBAKER_NUMERICALRESPONSE: HURTS A LITTLE BIT

## 2024-04-04 NOTE — PROGRESS NOTES
Outpatient Pharmacy Progress Note for Meds-to-Beds    Total number of Prescriptions Filled: 3  The following medications were dispensed to the patient during the discharge process:  benzonatate  fluconazole  nystatin    Additional Documentation:  {Blank single:08642::\"Medication(s) were delivered to the patient's room prior to discharge\",\"Patient picked-up the medication(s) in the OP Pharmacy\",\"Patient's family member picked-up the medication(s) in the OP Pharmacy\",\"Medications given to nurse *** to provide to patient\",\"Medications picked-up in the OP Pharmacy by nurse *** to provide to patient\"}      Thank you for letting [place] [address]    Phone: [phone]    Fax: [phone]

## 2024-04-04 NOTE — PROGRESS NOTES
Patient was seen in hospital for  COPD.  I am prescribing oxygen because the diagnosis and testing requires the patient to have oxygen in the home.  Conditions will improve or be benefited by oxygen use.  The patient is able to perform good mobility and therefore requires the use of a portable oxygen system for ambulation.

## 2024-04-04 NOTE — CARE COORDINATION
This RN case manager spoke with Rika with [employer] and they will cover 30 days of O2, but not after that until patient fills out needed documentation for [employer].

## 2024-04-04 NOTE — PROGRESS NOTES
4 Eyes Skin Assessment     NAME:  Kate Minor  YOB: 1960  MEDICAL RECORD NUMBER:  [id number]    The patient is being assessed for  Admission    I agree that at least one RN has performed a thorough Head to Toe Skin Assessment on the patient. ALL assessment sites listed below have been assessed.      Areas assessed by both nurses:    Head, Face, Ears, Shoulders, Back, Chest, Arms, Elbows, Hands, Sacrum. Buttock, Coccyx, Ischium, and Legs. Feet and Heels        Does the Patient have a Wound? No noted wound(s)       Navid Prevention initiated by RN: No  Wound Care Orders initiated by RN: No    Pressure Injury (Stage 3,4, Unstageable, DTI, NWPT, and Complex wounds) if present, place Wound referral order by RN under : No    New Ostomies, if present place, Ostomy referral order under : No     Nurse 1 eSignature: Electronically signed by Silvana Roger RN on 4/4/24 at 12:32 AM EDT    **SHARE this note so that the co-signing nurse can place an eSignature**    Nurse 2 eSignature: Electronically signed by Karthikeyan Johnson RN on 4/4/24 at 2:00 AM EDT

## 2024-04-04 NOTE — PROGRESS NOTES
Pt home O2 paperwork faxed to [facility]. Please do not discharge pt without oxygen. This testing will be good for 48 hours and will have to be repeated if pt has not discharged prior to then. If portable oxygen tank has not been delivered prior to pt being ready to leave, please call PFT @ [phone] or Respiratory Therapy @ [phone]. Thanks.

## 2024-04-04 NOTE — DISCHARGE INSTRUCTIONS
Please continue to take your medications as prescribed  DO NOT SMOKE WITH OXYGEN TANK  Please follow up with pulmonology, referral provided  Have your PCP repeat a chest CT in 3 months to evaluate the findings of pulmonary nodules

## 2024-04-04 NOTE — PROGRESS NOTES
pulmonary      SUBJECTIVE:  seen early am and sleeping and no sob at rest     OBJECTIVE    VITALS:  BP (!) 137/48   Pulse 72   Temp 97.4 °F (36.3 °C) (Oral)   Resp 22   Ht 1.473 m (4' 10\")   Wt 34.8 kg (76 lb 11.5 oz)   SpO2 93%   BMI 16.03 kg/m²   HEAD AND FACE EXAM:  No throat injection, no active exudate,no thrush  NECK EXAM;No JVD, no masses, symmetrical  CHEST EXAM; Expansion equal and symmetrical, no masses  LUNG EXAM; Good breath sounds bilaterally. There are expiratory wheezes both lungs, there are crackles at both lung bases  CARDIOVASCULAR EXAM: Positive S1 and S2, no S3 or S4, no clicks ,no murmurs  RIGHT AND LEFT LOWER EXTRIMITY EXAM: No edema, no swelling, no           LABS   Lab Results   Component Value Date    WBC 10.1 03/28/2024    HGB 13.1 03/28/2024    HCT 39.7 03/28/2024    MCV 97.8 03/28/2024     03/28/2024     Lab Results   Component Value Date    CREATININE 0.6 03/29/2024    BUN 29 (H) 03/29/2024     03/29/2024    K 4.7 03/29/2024    CL 98 (L) 03/29/2024    CO2 30 03/29/2024     Lab Results   Component Value Date    INR 1.1 01/23/2024    PROTIME 14.6 (H) 01/23/2024          Lab Results   Component Value Date/Time    PHOS 3.9 02/05/2024 05:22 AM    PHOS 4.2 01/24/2024 03:12 AM      No results for input(s): \"PH\", \"PO2ART\", \"YLL1MOB\", \"HCO3\", \"BEART\", \"O2SAT\" in the last 72 hours.      Wt Readings from Last 3 Encounters:   03/29/24 34.8 kg (76 lb 11.5 oz)   03/20/24 40.3 kg (88 lb 12.8 oz)   03/16/24 33.9 kg (74 lb 12.8 oz)               ASSESMENT  Ac resp failure  Ac copd  Positive inf A  Poss pneumonia        PLAN  Finished antibx and tamiflu  She qualifies for home o2  cpm    4/4/2024  Rafael Fallon MD, MJOSE ENRIQUE.

## 2024-04-04 NOTE — PROGRESS NOTES
Occupational Therapy    Occupational Therapy Treatment Note    Name: Kate Minor MRN: [id number] :   1960   Date:  2024   Admission Date: 3/24/2024 Room:  [address] [place] [address]-     Primary Problem:   The encounter diagnosis was Dysphagia, unspecified type.     Restrictions/Precautions:           general, fall, droplet ISO     Communication with other providers: Handoff to RT    Subjective:  Patient states:  \"\"I know it says 90%, but it doesn't feel like that.\"  Pain:   Location, Type, Intensity (0/10 to 10/10):  denies    Objective:    Observation: Pt presented in long sitting in bed upon arrival, agreeable to session.    Objective Measures:  Tele, 3L o2 NC, o2 89-90% with mobility 90-94% at rest    Treatment, including education:  Therapeutic Activity Training:   Therapeutic activity training was instructed today.  Cues were given for safety, sequence, UE/LE placement, awareness, and balance.    Activities performed today included bed mobility training, sup-sit, sit-stand, SPT.    Supine to Sit with SUP with HOB partially raised.     Sitting EOB with G balance.     Sit to Stand transfer from EOB with SBA.     Functional mobility for short spout including bed <> bathroom and later bathroom <> chair w/o AD with SBA/CGA.     Stand to Sit with SBA.     Self Care Training:   Cues were given for safety, sequence, UE/LE placement, visual cues, and balance.    Activities performed today included UB/LB dressing tasks, toileting, hand hygiene at sink.     LB dressing while seated EOB with Set-up A to don bilat socks.     Toileting:  Toilet t/f with SBA.  Urine hygiene in sitting with SUP.  Pull-up mgmt in standing with SBA.     Hygiene:  Pt stood at sink for ~5.5 min with SUP to wash hands and complete denture care with heavy reliance on B Ue's while completing.     Patient educated on role of OT , benefits of OT and rationale for therapeutic intervention. Educated on need to be patient advocate, benefit of EOB  activity. Pt left seated in recliner with call light within reach, chair alarm on, and all needs met. Handoff to RT at end of session.     Assessment / Impression:    Patient's tolerance of treatment: Well  Adverse Reaction: None  Significant change in status and impact: none   Barriers to improvement: SOB, activity tolerance       Plan for Next Session:    Cont OT POC    Time in:  0905  Time out:  0928  Timed treatment minutes:  23  Total treatment time:  23      Electronically signed by:    MASON Miguel,   4/4/2024, 9:36 AM        [NI] : Normal [de-identified] : scalp incisions c/d/i \par no palpable fluid collections or signs of infection

## 2024-04-04 NOTE — PROGRESS NOTES
Physical Therapy  Name: Kate Minor MRN: [id number] :   1960   Date:  2024   Admission Date: 3/24/2024 Room:  [address] [place] [address]   Restrictions/Precautions:         General Precautions, Fall Risk   Communication with other providers:  SHANTEL  Subjective:  Patient states:  she is cold and out of breath but will exercise  Pain:   Location, Type, Intensity (0/10 to 10/10):  0/10  Objective:    Observation:  pt was sitting up in the chair, slightly sob with O2 sat at 93% with NC O2 on  Treatment, including education/measures:   Exercises: with rest breaks for sob and education on energy conservation, O2 sats dropped to 88% with ex  Ankle pumps x 12  Sitting marching x 5  Hip abd x 5  LAQ's x 5  Pillow squeezes x 5  Nursing came in to give pt a shower and therapy was ended so that pt could tolerate her shower  Therapeutic Exercise:  Therapeutic exercises were instructed today.  Cues were given for technique, safety, recruitment, and rationale.  Cues were verbal and/or tactile.  Safety  Patient left safely in the chair, with call light/phone in reach with nursing in the room  Assessment / Impression:     Patient's tolerance of treatment:  fair, O2 sats dropped with activity   Adverse Reaction: drop in O2 sats with activity  Significant change in status and impact:  limited therapy  Barriers to improvement:  weakness, activity intolerance, endurance  Plan for Next Session:    Will cont to work towards pt's goals per her tolerance  Time in:  1015  Time out:  1045  Timed treatment minutes:  30  Total treatment time:  30  Previously filed items:  Social/Functional History  Lives With: Son, Family  Type of Home: Apartment  Home Layout: One level  Home Access: Stairs to enter with rails  Entrance Stairs - Number of Steps: 37 stairs to get to apartment  Bathroom Shower/Tub: Tub/Shower unit  Bathroom Toilet: Standard  Bathroom Equipment: Tub transfer bench  Has the patient had two or more falls in the past year or any

## 2024-04-04 NOTE — CARE COORDINATION
Received referral from [facility] to assist with oxygen rental at time of discharge.  Approved a 1x voucher for HO2 -- one month rental only and faxed voucher to [facility].  If pt requires oxygen past one month rental she may contact [facility] to determine what options they may have for assistance.       Pt was issued a voucher for medication on 1/25/2024 and a [employer] application was mailed to pt on 1/23/2024.  We are unable to provide further assistance, until we receive the [employer] application (with required documents) to determine eligibility for future assistance. SYED stated she would reiterate this information to the patient.

## 2024-04-04 NOTE — DISCHARGE SUMMARY
V2.0  Discharge Summary    Name:  Kate Minor /Age/Sex: 1960 (63 y.o. female)   Admit Date: 3/24/2024  Discharge Date: 24    MRN & CSN:  [id number] & [id number] Encounter Date and Time 24 7:42 AM EDT    Attending:  Grisel Neely* Discharging Provider: Grisel Neely MD       Hospital Course:     Brief HPI: Kate Minor is a 63 y.o. female with pmh of COPD, hypertension, Salas's esophagus, protein calorie malnutrition who presents with Acute hypoxemic respiratory failure (HCC).    Brief Problem Based Course:   Dysphagia likely 2/2 fungal esophagitis  Patient is s/p EGD, noted to have candida esophagitis, biopsy from stomach negative for H pylori and positive for fungal spores  -- Continue PPI, fluconazole and nystatin  -- She is tolerating regular diet  -- Cleared for DC as she did not complete the necessary steps for placement     Sepsis and acute respiratory failure with hypoxia and hypercapnia 2/2 Flu/COPD exacerbation/CAP  Influenza  Presented with shortness of breath and sore throat, SIRS / with tachycardia, tachypnea, temperature, elevated WBC lactic acid normal  -- VBG showed pCO2 55-59, initially requiring BiPAP, respiratory panel showed influenza A positive, she has completed ceftriaxone, azithromycin and Oseltamivir  -- Blood cultures no growth till date,  procalcitonin trended down to 0.190  -- Home O2 eval completed and patient qualifies  -- Continue inhalers, cough suppressants and PRN duoneb on DC     COPD Exacerbation  -- Continue scheduled bronchodilators, and tapered off steroid therapy  -- Pulm on board, appreciate recs, cleared for DC     Unintentional weight loss  Likely 2/2 severe COPD  -- CT chest showed severe emphysema and small pulm nodules. Rads recommends 3 month follow up CT  -- OP CT of the chest      History of Anxiety  -- Continue BuSpar     Hx CAD:   -- Continue Plavix and Statin     Hx GERD/Salas's Esophagus:    -- Continue PPI

## 2024-04-04 NOTE — PLAN OF CARE
Problem: Discharge Planning  Goal: Discharge to home or other facility with appropriate resources  4/3/2024 2324 by Niecy England LPN  Outcome: Progressing  4/3/2024 1614 by Beba Shabazz RN  Outcome: Progressing     Problem: Respiratory - Adult  Goal: Achieves optimal ventilation and oxygenation  4/3/2024 2324 by Niecy England LPN  Outcome: Progressing  4/3/2024 1614 by Beba Shabazz RN  Outcome: Progressing  Flowsheets (Taken 4/3/2024 0955 by Rylee Lay LPN)  Achieves optimal ventilation and oxygenation: Assess for changes in respiratory status     Problem: Infection - Adult  Goal: Absence of infection at discharge  4/3/2024 2324 by Niecy England LPN  Outcome: Progressing  4/3/2024 1614 by Beba Shabazz RN  Outcome: Progressing  Flowsheets (Taken 4/3/2024 1001 by Rylee Lay LPN)  Absence of infection at discharge: Assess and monitor for signs and symptoms of infection  Goal: Absence of infection during hospitalization  4/3/2024 2324 by Niecy England LPN  Outcome: Progressing  4/3/2024 1614 by Beba Shabazz RN  Outcome: Progressing  Flowsheets (Taken 4/3/2024 1001 by Rylee Lay LPN)  Absence of infection during hospitalization: Assess and monitor for signs and symptoms of infection  Goal: Absence of fever/infection during anticipated neutropenic period  4/3/2024 2324 by Niecy England LPN  Outcome: Progressing  4/3/2024 1614 by Beba Shabazz RN  Outcome: Progressing  Flowsheets (Taken 4/3/2024 1001 by Rylee Lay LPN)  Absence of fever/infection during anticipated neutropenic period: Monitor white blood cell count     Problem: Anxiety  Goal: Will report anxiety at manageable levels  Description: INTERVENTIONS:  1. Administer medication as ordered  2. Teach and rehearse alternative coping skills  3. Provide emotional support with 1:1 interaction with staff  4/3/2024 2324 by Niecy England LPN  Outcome: Progressing  4/3/2024 1614 by Beba Shabazz RN  Outcome: Progressing    meal consumed     Problem: Genitourinary - Adult  Goal: Absence of urinary retention  4/3/2024 2324 by Niecy England LPN  Outcome: Progressing  4/3/2024 1614 by Beba Shabazz RN  Outcome: Progressing  Flowsheets (Taken 4/3/2024 0955 by Rylee Lay LPN)  Absence of urinary retention: Assess patient’s ability to void and empty bladder     Problem: Metabolic/Fluid and Electrolytes - Adult  Goal: Electrolytes maintained within normal limits  4/3/2024 2324 by Niecy England LPN  Outcome: Progressing  4/3/2024 1614 by Beba Shabazz RN  Outcome: Progressing  Goal: Hemodynamic stability and optimal renal function maintained  4/3/2024 2324 by Niecy England LPN  Outcome: Progressing  4/3/2024 1614 by Beba Shabazz RN  Outcome: Progressing  Goal: Glucose maintained within prescribed range  4/3/2024 2324 by Niecy England LPN  Outcome: Progressing  4/3/2024 1614 by Beba Shabazz RN  Outcome: Progressing     Problem: Hematologic - Adult  Goal: Maintains hematologic stability  4/3/2024 2324 by Niecy England LPN  Outcome: Progressing  4/3/2024 1614 by Beba Shabazz RN  Outcome: Progressing     Problem: Skin/Tissue Integrity  Goal: Absence of new skin breakdown  Description: 1.  Monitor for areas of redness and/or skin breakdown  2.  Assess vascular access sites hourly  3.  Every 4-6 hours minimum:  Change oxygen saturation probe site  4.  Every 4-6 hours:  If on nasal continuous positive airway pressure, respiratory therapy assess nares and determine need for appliance change or resting period.  4/3/2024 2324 by Niecy England LPN  Outcome: Progressing  4/3/2024 1614 by Beba hSabazz RN  Outcome: Progressing

## 2024-04-05 ENCOUNTER — APPOINTMENT (OUTPATIENT)
Dept: GENERAL RADIOLOGY | Age: 64
End: 2024-04-05
Payer: MEDICAID

## 2024-04-05 ENCOUNTER — HOSPITAL ENCOUNTER (EMERGENCY)
Age: 64
Discharge: HOME | End: 2024-04-06
Attending: EMERGENCY MEDICINE
Payer: MEDICAID

## 2024-04-05 VITALS
DIASTOLIC BLOOD PRESSURE: 56 MMHG | BODY MASS INDEX: 15.09 KG/M2 | OXYGEN SATURATION: 93 % | TEMPERATURE: 98.1 F | RESPIRATION RATE: 16 BRPM | SYSTOLIC BLOOD PRESSURE: 132 MMHG | HEIGHT: 58 IN | WEIGHT: 71.87 LBS | HEART RATE: 71 BPM

## 2024-04-05 DIAGNOSIS — J44.1 COPD EXACERBATION (HCC): ICD-10-CM

## 2024-04-05 DIAGNOSIS — F41.1 ANXIETY STATE: Primary | ICD-10-CM

## 2024-04-05 LAB
BASOPHILS ABSOLUTE: 0 K/CU MM
BASOPHILS RELATIVE PERCENT: 0.2 % (ref 0–1)
DIFFERENTIAL TYPE: ABNORMAL
EOSINOPHILS ABSOLUTE: 0.1 K/CU MM
EOSINOPHILS RELATIVE PERCENT: 0.9 % (ref 0–3)
HCT VFR BLD CALC: 41.6 % (ref 37–47)
HEMOGLOBIN: 14.1 GM/DL (ref 12.5–16)
IMMATURE NEUTROPHIL %: 0.6 % (ref 0–0.43)
LYMPHOCYTES ABSOLUTE: 1.4 K/CU MM
LYMPHOCYTES RELATIVE PERCENT: 10 % (ref 24–44)
MCH RBC QN AUTO: 32.4 PG (ref 27–31)
MCHC RBC AUTO-ENTMCNC: 33.9 % (ref 32–36)
MCV RBC AUTO: 95.6 FL (ref 78–100)
MONOCYTES ABSOLUTE: 1.1 K/CU MM
MONOCYTES RELATIVE PERCENT: 7.5 % (ref 0–4)
PDW BLD-RTO: 12.3 % (ref 11.7–14.9)
PLATELET # BLD: 410 K/CU MM (ref 140–440)
PMV BLD AUTO: 9.3 FL (ref 7.5–11.1)
RBC # BLD: 4.35 M/CU MM (ref 4.2–5.4)
SEGMENTED NEUTROPHILS ABSOLUTE COUNT: 11.4 K/CU MM
SEGMENTED NEUTROPHILS RELATIVE PERCENT: 80.8 % (ref 36–66)
TOTAL IMMATURE NEUTOROPHIL: 0.08 K/CU MM
WBC # BLD: 14 K/CU MM (ref 4–10.5)

## 2024-04-05 PROCEDURE — 6370000000 HC RX 637 (ALT 250 FOR IP): Performed by: INTERNAL MEDICINE

## 2024-04-05 PROCEDURE — 96374 THER/PROPH/DIAG INJ IV PUSH: CPT

## 2024-04-05 PROCEDURE — 2700000000 HC OXYGEN THERAPY PER DAY

## 2024-04-05 PROCEDURE — 83880 ASSAY OF NATRIURETIC PEPTIDE: CPT

## 2024-04-05 PROCEDURE — 99285 EMERGENCY DEPT VISIT HI MDM: CPT

## 2024-04-05 PROCEDURE — 94761 N-INVAS EAR/PLS OXIMETRY MLT: CPT

## 2024-04-05 PROCEDURE — 6360000002 HC RX W HCPCS: Performed by: EMERGENCY MEDICINE

## 2024-04-05 PROCEDURE — 84484 ASSAY OF TROPONIN QUANT: CPT

## 2024-04-05 PROCEDURE — 71045 X-RAY EXAM CHEST 1 VIEW: CPT

## 2024-04-05 PROCEDURE — 94660 CPAP INITIATION&MGMT: CPT

## 2024-04-05 PROCEDURE — 85025 COMPLETE CBC W/AUTO DIFF WBC: CPT

## 2024-04-05 PROCEDURE — 94640 AIRWAY INHALATION TREATMENT: CPT

## 2024-04-05 PROCEDURE — 80048 BASIC METABOLIC PNL TOTAL CA: CPT

## 2024-04-05 PROCEDURE — 6360000002 HC RX W HCPCS: Performed by: INTERNAL MEDICINE

## 2024-04-05 RX ORDER — ALBUTEROL SULFATE 2.5 MG/3ML
2.5 SOLUTION RESPIRATORY (INHALATION) ONCE
Status: COMPLETED | OUTPATIENT
Start: 2024-04-05 | End: 2024-04-06

## 2024-04-05 RX ORDER — METHYLPREDNISOLONE SODIUM SUCCINATE 125 MG/2ML
60 INJECTION, POWDER, LYOPHILIZED, FOR SOLUTION INTRAMUSCULAR; INTRAVENOUS ONCE
Status: COMPLETED | OUTPATIENT
Start: 2024-04-05 | End: 2024-04-05

## 2024-04-05 RX ORDER — IPRATROPIUM BROMIDE AND ALBUTEROL SULFATE 2.5; .5 MG/3ML; MG/3ML
1 SOLUTION RESPIRATORY (INHALATION) ONCE
Status: COMPLETED | OUTPATIENT
Start: 2024-04-05 | End: 2024-04-06

## 2024-04-05 RX ADMIN — ROSUVASTATIN CALCIUM 20 MG: 20 TABLET, COATED ORAL at 10:00

## 2024-04-05 RX ADMIN — IPRATROPIUM BROMIDE AND ALBUTEROL SULFATE 1 DOSE: 2.5; .5 SOLUTION RESPIRATORY (INHALATION) at 14:43

## 2024-04-05 RX ADMIN — TIOTROPIUM BROMIDE INHALATION SPRAY 2 PUFF: 3.12 SPRAY, METERED RESPIRATORY (INHALATION) at 07:06

## 2024-04-05 RX ADMIN — BENZONATATE 100 MG: 100 CAPSULE ORAL at 07:04

## 2024-04-05 RX ADMIN — LORAZEPAM 0.5 MG: 0.5 TABLET ORAL at 07:04

## 2024-04-05 RX ADMIN — ENOXAPARIN SODIUM 30 MG: 100 INJECTION SUBCUTANEOUS at 10:01

## 2024-04-05 RX ADMIN — METOPROLOL TARTRATE 25 MG: 25 TABLET, FILM COATED ORAL at 10:01

## 2024-04-05 RX ADMIN — BUSPIRONE HYDROCHLORIDE 5 MG: 5 TABLET ORAL at 10:00

## 2024-04-05 RX ADMIN — ACETAMINOPHEN 650 MG: 325 TABLET ORAL at 06:41

## 2024-04-05 RX ADMIN — BUDESONIDE AND FORMOTEROL FUMARATE DIHYDRATE 2 PUFF: 160; 4.5 AEROSOL RESPIRATORY (INHALATION) at 07:06

## 2024-04-05 RX ADMIN — METHYLPREDNISOLONE SODIUM SUCCINATE 60 MG: 125 INJECTION, POWDER, LYOPHILIZED, FOR SOLUTION INTRAMUSCULAR; INTRAVENOUS at 23:42

## 2024-04-05 RX ADMIN — POLYETHYLENE GLYCOL 3350 17 G: 17 POWDER, FOR SOLUTION ORAL at 09:59

## 2024-04-05 RX ADMIN — NYSTATIN 500000 UNITS: 100000 SUSPENSION ORAL at 10:01

## 2024-04-05 RX ADMIN — FLUCONAZOLE 100 MG: 100 TABLET ORAL at 10:00

## 2024-04-05 RX ADMIN — PANTOPRAZOLE SODIUM 40 MG: 40 TABLET, DELAYED RELEASE ORAL at 06:27

## 2024-04-05 RX ADMIN — ALBUTEROL SULFATE 2 PUFF: 90 AEROSOL, METERED RESPIRATORY (INHALATION) at 07:08

## 2024-04-05 RX ADMIN — RANOLAZINE 500 MG: 500 TABLET, EXTENDED RELEASE ORAL at 10:01

## 2024-04-05 RX ADMIN — NYSTATIN 500000 UNITS: 100000 SUSPENSION ORAL at 13:59

## 2024-04-05 RX ADMIN — AMLODIPINE BESYLATE 5 MG: 5 TABLET ORAL at 10:01

## 2024-04-05 ASSESSMENT — PAIN SCALES - WONG BAKER
WONGBAKER_NUMERICALRESPONSE: HURTS A LITTLE BIT
WONGBAKER_NUMERICALRESPONSE: HURTS A LITTLE BIT

## 2024-04-05 ASSESSMENT — PAIN DESCRIPTION - DESCRIPTORS: DESCRIPTORS: ACHING

## 2024-04-05 ASSESSMENT — PAIN DESCRIPTION - LOCATION: LOCATION: HEAD

## 2024-04-05 ASSESSMENT — PAIN - FUNCTIONAL ASSESSMENT: PAIN_FUNCTIONAL_ASSESSMENT: NONE - DENIES PAIN

## 2024-04-05 ASSESSMENT — PAIN SCALES - GENERAL: PAINLEVEL_OUTOF10: 6

## 2024-04-05 NOTE — PROGRESS NOTES
Pt has a discharge order however; pt is now wishing to go to a ECF at discharge.  Waiting to speak with case management.

## 2024-04-05 NOTE — CARE COORDINATION
To clarify. [facility] will only cover ONE MONTH RENTAL of HO2.  If patient requires it longer than that she will need to contact [facility] to apply for a hardship with them.  [facility] does NOT cover any longer than one month.    However if she is needing any further assistance with medication cost she MUST complete our application and return with all required documents.

## 2024-04-05 NOTE — PROGRESS NOTES
V2.0    [facility] Progress Note      Name:  Kate Minor /Age/Sex: 1960  (63 y.o. female)   MRN & CSN:  [id number] & [id number] Encounter Date/Time: 2024 8:13 AM EDT   [facility]  [facility] PCP: Sharyn Delatorre APRN - CNP     Attending:Grisel Neely*       Hospital Day: 13    Assessment and Recommendations   Kate Minor is a 63 y.o. female with pmh of COPD, hypertension, Salas's esophagus, protein calorie malnutrition who presents with Acute hypoxemic respiratory failure (HCC)    Patient was cleared and discharged yesterday. She was given the opportunity to turn in the documents that would be used to apply for medicaid several times in the course of this admission and did not turn it in. I was contacted yesterday evening by nurse and explained that I could not send her to rehab for this reason. Patient was not discharged yesterday because she wanted to appeal DC. She has met with CM this AM who explained at length. She remains cleared for DC. O2 supplied and patient given explanation on how to go about filing for assistance.        Plan:   Dysphagia likely 2/2 fungal esophagitis - resolving   Patient is s/p EGD, noted to have candida esophagitis, biopsy from stomach negative for H pylori and positive for fungal spores  -- Continue PPI, fluconazole and nystatin  -- She is tolerating regular diet  -- Cleared for DC as she did not complete the necessary steps for placement     Sepsis and acute respiratory failure with hypoxia and hypercapnia 2/2 Flu/COPD exacerbation/CAP - resolved  Influenza - resolved  Presented with shortness of breath and sore throat, SIRS 4/4 with tachycardia, tachypnea, temperature, elevated WBC lactic acid normal  -- VBG showed pCO2 55-59, initially requiring BiPAP, respiratory panel showed influenza A positive, she has completed ceftriaxone, azithromycin and Oseltamivir  -- Blood cultures no growth till date,  procalcitonin trended down to 0.190  -- Home O2  in the last 72 hours.  UA:  Lab Results   Component Value Date/Time    NITRU NEGATIVE 03/16/2024 04:15 AM    COLORU YELLOW 03/16/2024 04:15 AM    PHUR 5.5 02/22/2017 03:37 PM    WBCUA 0 TO 1 03/18/2016 03:00 PM    RBCUA NEGATIVE 03/18/2016 03:00 PM    MUCUS 1+ 09/30/2013 05:45 PM    BACTERIA LARGE 03/18/2016 03:00 PM    CLARITYU CLEAR 03/16/2024 04:15 AM    SPECGRAV 1.020 03/16/2024 04:15 AM    LEUKOCYTESUR NEGATIVE 03/16/2024 04:15 AM    UROBILINOGEN 0.2 03/16/2024 04:15 AM    BILIRUBINUR NEGATIVE 03/16/2024 04:15 AM    BILIRUBINUR Negative 02/22/2017 03:37 PM    BLOODU NEGATIVE 03/16/2024 04:15 AM    GLUCOSEU Negative 02/22/2017 03:37 PM    KETUA NEGATIVE 03/16/2024 04:15 AM     Urine Cultures: No results found for: \"LABURIN\"  Blood Cultures: No results found for: \"BC\"  No results found for: \"BLOODCULT2\"  Organism: No results found for: \"ORG\"      Electronically signed by Grisel Neely MD on 4/5/2024 at 1:30 PM

## 2024-04-05 NOTE — CARE COORDINATION
This RN case manager advised that patient did not want to leave last night when discharged and wants to go to a SNF. This RN CM spoke with patient this am and advised her that [facility] , it would be private pay. She states she cannot afford this. I asked her if her family turned in the appropriate documentation for Medicaid. She is unsure and is going to call dtr. I advise pt that we would need the medicaid pending number for her to admit to a SNF. Patient is unable to appeal DC as she does not have insurance. Patient advised that she has been deemed medically ready to discharge by provider and will need to be discharged today. We have advised patient and family, along with FC that documentation needs to be turned in several times over this admission. Hanna rodrigues nurse advised that patient is medically ready, and that [facility] has agreed to help with O2 for the first 30 days. Patient also needs to turn in required documentation for [employer] [facility] to receive any assistance past those 30 days. Patient has been advised of this. Message to FC to verify no pending medicaid number has been placed. Patient is to be discharged home today.     [id number] - This RN case manager heard back from Angelina Orlando and family has not emailed needed information. Family states they would email over needed information today, I informed Angelina that patient is medically ready to discharge. Patient was originally denied Medicaid due to required documentation was not sent in. Patient will be discharged today. Charge nurse and Dr. Carrillo advised. Dr. Carrillo advised this RN CM that patient is medically ready and can be sent home.     [id number] - This RN case manager spoke with patient AGAIN as she told charge she cannot go home. I advised patient that I spoke with Dr. Carrillo and patient is medically ready to go. I also reiterated that it is crucial for them to follow through this time on turning in necessary documentation to both [facility] AND [facility] so she does not run into these issues in the future.Patient's DIL gets off work at 2pm and will pick patient up. Patient is to be discharged today.

## 2024-04-05 NOTE — PROGRESS NOTES
pulmonary      SUBJECTIVE:  she is weak and develop sob but has stabilized     OBJECTIVE    VITALS:  /60   Pulse 59   Temp 97.5 °F (36.4 °C) (Oral)   Resp 17   Ht 1.473 m (4' 10\")   Wt 32.6 kg (71 lb 13.9 oz)   SpO2 94%   BMI 15.02 kg/m²   HEAD AND FACE EXAM:  No throat injection, no active exudate,no thrush  NECK EXAM;No JVD, no masses, symmetrical  CHEST EXAM; Expansion equal and symmetrical, no masses  LUNG EXAM; Good breath sounds bilaterally. There are expiratory wheezes both lungs, there are crackles at both lung bases  CARDIOVASCULAR EXAM: Positive S1 and S2, no S3 or S4, no clicks ,no murmurs  RIGHT AND LEFT LOWER EXTRIMITY EXAM: No edema, no swelling, no inflamation  CNS EXAM: Alert and oriented X3          LABS   Lab Results   Component Value Date    WBC 10.1 03/28/2024    HGB 13.1 03/28/2024    HCT 39.7 03/28/2024    MCV 97.8 03/28/2024     03/28/2024     Lab Results   Component Value Date    CREATININE 0.6 03/29/2024    BUN 29 (H) 03/29/2024     03/29/2024    K 4.7 03/29/2024    CL 98 (L) 03/29/2024    CO2 30 03/29/2024     Lab Results   Component Value Date    INR 1.1 01/23/2024    PROTIME 14.6 (H) 01/23/2024          Lab Results   Component Value Date/Time    PHOS 3.9 02/05/2024 05:22 AM    PHOS 4.2 01/24/2024 03:12 AM      No results for input(s): \"PH\", \"PO2ART\", \"KAH5SUX\", \"HCO3\", \"BEART\", \"O2SAT\" in the last 72 hours.      Wt Readings from Last 3 Encounters:   04/05/24 32.6 kg (71 lb 13.9 oz)   03/20/24 40.3 kg (88 lb 12.8 oz)   03/16/24 33.9 kg (74 lb 12.8 oz)               ASSESMENT  Ac resp failure  Ac copd  pneumonia        PLAN  Cpm  O2 adm    4/5/2024  Rafael Fallon MD, M.D.   Diet    Eat light on the day of surgery. Nausea and vomiting can occur after anesthesia,   but usually resolve within 24 hours.  Resume normal diet the following day.      Activity    Rest!  No heavy lifting or strenuous activity.    Medications    Ibuprofen (Advil, Motrin), Naprosyn (Aleve) or Extra-Strength Tylenol for pain.  Tramadol for severe pain only.  Your prescription was sent electronically to your pharmacy.  Remember, Tramadol is a strong narcotic-like pain reliever which can cause drowsiness, upset stomach and constipation.  It should always be taken with food.  You can use stool softener (Mineral Oil) or laxative (MiraLax or Dulcolax) if constipated.  An antibiotic is given during surgery.  No antibiotic needed at home.   Resume all previous medications.  Resume blood thinners the day after surgery unless told otherwise.    Wound Care    Leave surgical dressing in place.  May shower (dressing is waterproof.)  No pool, ocean, lake, hot-tub or   bath for 3 weeks. If you were given an abdominal binder, please wear it as much as possible (day & night)   for 2 weeks, but you must remove it for showers.  Ice packs to the area intermittently for several days help   with pain and swelling.  Bruising (“black and blue”) is common.  Treatment is…Ice & Rest.

## 2024-04-05 NOTE — PLAN OF CARE
Problem: Anxiety  Goal: Will report anxiety at manageable levels  Description: INTERVENTIONS:  1. Administer medication as ordered  2. Teach and rehearse alternative coping skills  3. Provide emotional support with 1:1 interaction with staff  Outcome: Progressing     Problem: Metabolic/Fluid and Electrolytes - Adult  Goal: Electrolytes maintained within normal limits  Outcome: Progressing  Goal: Hemodynamic stability and optimal renal function maintained  Outcome: Progressing     Problem: Coping  Goal: Pt/Family able to verbalize concerns and demonstrate effective coping strategies  Description: INTERVENTIONS:  1. Assist patient/family to identify coping skills, available support systems and cultural and spiritual values  2. Provide emotional support, including active listening and acknowledgement of concerns of patient and caregivers  3. Reduce environmental stimuli, as able  4. Instruct patient/family in relaxation techniques, as appropriate  5. Assess for spiritual pain/suffering and initiate Spiritual Care, Psychosocial Clinical Specialist consults as needed  Outcome: Progressing     Problem: Anxiety  Goal: Will report anxiety at manageable levels  Description: INTERVENTIONS:  1. Administer medication as ordered  2. Teach and rehearse alternative coping skills  3. Provide emotional support with 1:1 interaction with staff  Outcome: Progressing

## 2024-04-05 NOTE — PROGRESS NOTES
Patient was cleared and discharged yesterday. She was given the opportunity to turn in the documents that would be used to apply for medicaid several times in the course of this admission and did not turn it in. Was contacted yesterday evening by nurse and explained that I could not send her to rehab for this reason. Patient was not discharged yesterday because she wanted tot appeal DC. She has met with CM this AM who explained at length.    General: NAD, very thin  Eyes: EOMI  ENT: neck supple  Cardiovascular: Regular rate.  Respiratory: mild wheezing  Gastrointestinal: Soft, non tender  Genitourinary: no suprapubic tenderness  Musculoskeletal: No edema  Skin: warm, dry  Neuro: Alert.  Psych: Anxious    She remains cleared for DC. O2 supplied and patient given explanation on how to go about filing for assistance.

## 2024-04-05 NOTE — PROGRESS NOTES
[facility] has been notified that pt needs a ride to [place].  The will be here around 1500.  Pt is aware.  Pt has home o2.

## 2024-04-05 NOTE — PROGRESS NOTES
Pt son called and told us that the pt was discharged with her iv still in her arm.  I asked for him to come back or go to the emergency room in [place].  Pt had to be taken home by convenient transport due to car not working.  Pt said he was going to [facility] hospital  Pt son said he would have his neighbor who is a nurse remove the iv.  Rambo manager was notified.

## 2024-04-06 VITALS
SYSTOLIC BLOOD PRESSURE: 162 MMHG | HEART RATE: 88 BPM | WEIGHT: 71 LBS | HEIGHT: 58 IN | RESPIRATION RATE: 24 BRPM | DIASTOLIC BLOOD PRESSURE: 98 MMHG | TEMPERATURE: 97.9 F | BODY MASS INDEX: 14.91 KG/M2 | OXYGEN SATURATION: 100 %

## 2024-04-06 LAB
ANION GAP SERPL CALCULATED.3IONS-SCNC: 12 MMOL/L (ref 7–16)
BUN SERPL-MCNC: 22 MG/DL (ref 6–23)
CALCIUM SERPL-MCNC: 9.6 MG/DL (ref 8.3–10.6)
CHLORIDE BLD-SCNC: 99 MMOL/L (ref 99–110)
CO2: 27 MMOL/L (ref 21–32)
CREAT SERPL-MCNC: 0.7 MG/DL (ref 0.6–1.1)
GFR SERPL CREATININE-BSD FRML MDRD: >90 ML/MIN/1.73M2
GLUCOSE SERPL-MCNC: 108 MG/DL (ref 70–99)
POTASSIUM SERPL-SCNC: 4.5 MMOL/L (ref 3.5–5.1)
PRO-BNP: 240.4 PG/ML
SODIUM BLD-SCNC: 138 MMOL/L (ref 135–145)
TROPONIN, HIGH SENSITIVITY: 11 NG/L (ref 0–14)
TROPONIN, HIGH SENSITIVITY: 11 NG/L (ref 0–14)

## 2024-04-06 PROCEDURE — 84484 ASSAY OF TROPONIN QUANT: CPT

## 2024-04-06 PROCEDURE — 6360000002 HC RX W HCPCS: Performed by: EMERGENCY MEDICINE

## 2024-04-06 PROCEDURE — 6370000000 HC RX 637 (ALT 250 FOR IP): Performed by: EMERGENCY MEDICINE

## 2024-04-06 PROCEDURE — 94640 AIRWAY INHALATION TREATMENT: CPT

## 2024-04-06 RX ORDER — LORAZEPAM 0.5 MG/1
0.5 TABLET ORAL 3 TIMES DAILY PRN
Qty: 12 TABLET | Refills: 0 | Status: SHIPPED | OUTPATIENT
Start: 2024-04-06 | End: 2024-05-06

## 2024-04-06 RX ORDER — LORAZEPAM 0.5 MG/1
0.5 TABLET ORAL ONCE
Status: COMPLETED | OUTPATIENT
Start: 2024-04-06 | End: 2024-04-06

## 2024-04-06 RX ORDER — ACETAMINOPHEN 500 MG
1000 TABLET ORAL ONCE
Status: COMPLETED | OUTPATIENT
Start: 2024-04-06 | End: 2024-04-06

## 2024-04-06 RX ADMIN — ACETAMINOPHEN 1000 MG: 500 TABLET ORAL at 00:16

## 2024-04-06 RX ADMIN — ALBUTEROL SULFATE 2.5 MG: 2.5 SOLUTION RESPIRATORY (INHALATION) at 00:12

## 2024-04-06 RX ADMIN — LORAZEPAM 0.5 MG: 0.5 TABLET ORAL at 01:24

## 2024-04-06 RX ADMIN — IPRATROPIUM BROMIDE AND ALBUTEROL SULFATE 1 DOSE: 2.5; .5 SOLUTION RESPIRATORY (INHALATION) at 00:12

## 2024-04-06 ASSESSMENT — ENCOUNTER SYMPTOMS
EYES NEGATIVE: 1
GASTROINTESTINAL NEGATIVE: 1
SHORTNESS OF BREATH: 1

## 2024-04-06 ASSESSMENT — PAIN SCALES - GENERAL: PAINLEVEL_OUTOF10: 9

## 2024-04-06 ASSESSMENT — PAIN DESCRIPTION - LOCATION: LOCATION: HEAD

## 2024-04-06 ASSESSMENT — PAIN DESCRIPTION - DESCRIPTORS: DESCRIPTORS: SHARP

## 2024-04-06 NOTE — ED PROVIDER NOTES
Shortness of Breath  Severity:  Mild  Onset quality:  Sudden  Timing:  Constant  Progression:  Unchanged  Chronicity:  Chronic  Context comment:  Patient was released from [facility] today and has new home oxygen. She had anxiety attack and COPD exacerbation  Relieved by:  Nothing  Worsened by:  Deep breathing, activity and coughing  Ineffective treatments:  None tried  Associated symptoms: cough and wheezing    Associated symptoms: no chest pain, no claudication and no diaphoresis        Review of Systems   Constitutional: Negative.  Negative for diaphoresis.   HENT: Negative.     Eyes: Negative.    Respiratory:  Positive for cough, shortness of breath and wheezing.    Cardiovascular: Negative.  Negative for chest pain and claudication.   Gastrointestinal: Negative.    Genitourinary: Negative.    Musculoskeletal: Negative.    Skin: Negative.    Neurological: Negative.    All other systems reviewed and are negative.      Family History   Problem Relation Age of Onset    Heart Disease Mother     Heart Disease Father     Cancer Sister         brain CA    Cancer Brother         lung CA     Social History     Socioeconomic History    Marital status: Single     Spouse name: Not on file    Number of children: Not on file    Years of education: Not on file    Highest education level: Not on file   Occupational History    Not on file   Tobacco Use    Smoking status: Every Day     Current packs/day: 1.00     Types: Cigarettes     Passive exposure: Current    Smokeless tobacco: Never   Vaping Use    Vaping Use: Never used   Substance and Sexual Activity    Alcohol use: Yes     Comment: maya    Drug use: Yes     Types: Marijuana (Weed)    Sexual activity: Not Currently   Other Topics Concern    Not on file   Social History Narrative    Not on file     Social Determinants of Health     Financial Resource Strain: Not on file   Food Insecurity: Food Insecurity Present (3/24/2024)    Hunger Vital Sign     Worried About Running Out  of Food in the Last Year: Sometimes true     Ran Out of Food in the Last Year: Sometimes true   Transportation Needs: No Transportation Needs (3/24/2024)    PRAPARE - Transportation     Lack of Transportation (Medical): No     Lack of Transportation (Non-Medical): No   Recent Concern: Transportation Needs - Unmet Transportation Needs (1/23/2024)    PRAPARE - Transportation     Lack of Transportation (Medical): Yes     Lack of Transportation (Non-Medical): No   Physical Activity: Not on file   Stress: Not on file   Social Connections: Not on file   Intimate Partner Violence: Not on file   Housing Stability: Low Risk  (3/24/2024)    Housing Stability Vital Sign     Unable to Pay for Housing in the Last Year: No     Number of Places Lived in the Last Year: 1     Unstable Housing in the Last Year: No     Past Surgical History:   Procedure Laterality Date    APPENDECTOMY      CARDIAC PROCEDURE N/A 1/24/2024    Left heart cath / coronary angiography performed by Ching Manuel MD at [facility] CARDIAC CATH LAB    COLONOSCOPY      COLONOSCOPY N/A 6/8/2023    COLONOSCOPY POLYPECTOMY SNARE/COLD BIOPSY with polypectomy at hepatic flexure polyp, ascending colon, sigmoid colon and rectal colon polyps performed by Kyrie Marsh MD at [facility] ENDOSCOPY    UPPER GASTROINTESTINAL ENDOSCOPY N/A 6/8/2023    EGD BIOPSY for  Celiac Disease and H-Pyloric performed by Kyrie Marsh MD at [facility] ENDOSCOPY    UPPER GASTROINTESTINAL ENDOSCOPY N/A 3/30/2024    ESOPHAGOGASTRODUODENOSCOPY BIOPSY performed by Maximo Mittal MD at [facility] ENDOSCOPY     Past Medical History:   Diagnosis Date    Salas's esophagus     Elevated troponin 01/23/2024    Emphysema of lung (HCC)     H/O Doppler ultrasound carotid 05/16/2023    Carotid Moderate (50-69%) disease of the Right proximal Internal carotid artery. Mild (0-49%) disease of the Left mid Internal carotid artery.    H/O echocardiogram 05/16/2023    Echo EF 55-60%. Doppler evaluation reveals mild aortic, mild

## 2024-04-08 ENCOUNTER — TELEPHONE (OUTPATIENT)
Dept: PULMONOLOGY | Age: 64
End: 2024-04-08

## 2024-07-09 ENCOUNTER — APPOINTMENT (OUTPATIENT)
Dept: GENERAL RADIOLOGY | Age: 64
DRG: 133 | End: 2024-07-09
Payer: COMMERCIAL

## 2024-07-09 ENCOUNTER — HOSPITAL ENCOUNTER (INPATIENT)
Age: 64
LOS: 3 days | Discharge: HOME | DRG: 133 | End: 2024-07-12
Attending: EMERGENCY MEDICINE | Admitting: FAMILY MEDICINE
Payer: COMMERCIAL

## 2024-07-09 DIAGNOSIS — R06.02 SHORTNESS OF BREATH: ICD-10-CM

## 2024-07-09 DIAGNOSIS — J44.1 ACUTE EXACERBATION OF CHRONIC OBSTRUCTIVE PULMONARY DISEASE (COPD) (HCC): Primary | ICD-10-CM

## 2024-07-09 LAB
ALBUMIN SERPL-MCNC: 3.9 GM/DL (ref 3.4–5)
ALP BLD-CCNC: 70 IU/L (ref 40–129)
ALT SERPL-CCNC: 14 U/L (ref 10–40)
ANION GAP SERPL CALCULATED.3IONS-SCNC: 14 MMOL/L (ref 7–16)
AST SERPL-CCNC: 21 IU/L (ref 15–37)
BASOPHILS ABSOLUTE: 0 K/CU MM
BASOPHILS RELATIVE PERCENT: 0.5 % (ref 0–1)
BILIRUB SERPL-MCNC: 0.4 MG/DL (ref 0–1)
BILIRUBIN, URINE: NEGATIVE MG/DL
BLOOD, URINE: NEGATIVE
BUN SERPL-MCNC: 12 MG/DL (ref 6–23)
CALCIUM SERPL-MCNC: 9.4 MG/DL (ref 8.3–10.6)
CHLORIDE BLD-SCNC: 104 MMOL/L (ref 99–110)
CLARITY, UA: CLEAR
CO2: 27 MMOL/L (ref 21–32)
COLOR, UA: YELLOW
COMMENT UA: NORMAL
CREAT SERPL-MCNC: 0.7 MG/DL (ref 0.6–1.1)
DIFFERENTIAL TYPE: ABNORMAL
EKG ATRIAL RATE: 67 BPM
EKG DIAGNOSIS: NORMAL
EKG P AXIS: 85 DEGREES
EKG P-R INTERVAL: 108 MS
EKG Q-T INTERVAL: 398 MS
EKG QRS DURATION: 66 MS
EKG QTC CALCULATION (BAZETT): 420 MS
EKG R AXIS: 69 DEGREES
EKG T AXIS: 229 DEGREES
EKG VENTRICULAR RATE: 67 BPM
EOSINOPHILS ABSOLUTE: 0.2 K/CU MM
EOSINOPHILS RELATIVE PERCENT: 2.3 % (ref 0–3)
GFR, ESTIMATED: >90 ML/MIN/1.73M2
GLUCOSE SERPL-MCNC: 100 MG/DL (ref 70–99)
GLUCOSE URINE: NEGATIVE MG/DL
HCT VFR BLD CALC: 34 % (ref 37–47)
HEMOGLOBIN: 11.5 GM/DL (ref 12.5–16)
IMMATURE NEUTROPHIL %: 0.1 % (ref 0–0.43)
KETONES, URINE: NEGATIVE MG/DL
LEUKOCYTE ESTERASE, URINE: NEGATIVE
LYMPHOCYTES ABSOLUTE: 1.6 K/CU MM
LYMPHOCYTES RELATIVE PERCENT: 20.8 % (ref 24–44)
MAGNESIUM: 1.9 MG/DL (ref 1.8–2.4)
MCH RBC QN AUTO: 33.3 PG (ref 27–31)
MCHC RBC AUTO-ENTMCNC: 33.8 % (ref 32–36)
MCV RBC AUTO: 98.6 FL (ref 78–100)
MONOCYTES ABSOLUTE: 0.6 K/CU MM
MONOCYTES RELATIVE PERCENT: 7.5 % (ref 0–4)
NEUTROPHILS ABSOLUTE: 5.1 K/CU MM
NEUTROPHILS RELATIVE PERCENT: 68.8 % (ref 36–66)
NITRITE URINE, QUANTITATIVE: NEGATIVE
PDW BLD-RTO: 11.4 % (ref 11.7–14.9)
PH, URINE: 6.5 (ref 5–8)
PLATELET # BLD: 194 K/CU MM (ref 140–440)
PMV BLD AUTO: 11.4 FL (ref 7.5–11.1)
POTASSIUM SERPL-SCNC: 4.2 MMOL/L (ref 3.5–5.1)
PRO-BNP: 610 PG/ML
PROTEIN UA: NEGATIVE MG/DL
RBC # BLD: 3.45 M/CU MM (ref 4.2–5.4)
SODIUM BLD-SCNC: 145 MMOL/L (ref 135–145)
SPECIFIC GRAVITY UA: <1.005 (ref 1–1.03)
TOTAL IMMATURE NEUTOROPHIL: 0.01 K/CU MM
TOTAL PROTEIN: 6.4 GM/DL (ref 6.4–8.2)
TROPONIN, HIGH SENSITIVITY: 18 NG/L (ref 0–14)
TROPONIN, HIGH SENSITIVITY: 21 NG/L (ref 0–14)
UROBILINOGEN, URINE: 0.2 MG/DL (ref 0.2–1)
WBC # BLD: 7.5 K/CU MM (ref 4–10.5)

## 2024-07-09 PROCEDURE — 83735 ASSAY OF MAGNESIUM: CPT

## 2024-07-09 PROCEDURE — 83880 ASSAY OF NATRIURETIC PEPTIDE: CPT

## 2024-07-09 PROCEDURE — 87205 SMEAR GRAM STAIN: CPT

## 2024-07-09 PROCEDURE — 6370000000 HC RX 637 (ALT 250 FOR IP): Performed by: FAMILY MEDICINE

## 2024-07-09 PROCEDURE — 6370000000 HC RX 637 (ALT 250 FOR IP): Performed by: NURSE PRACTITIONER

## 2024-07-09 PROCEDURE — 80053 COMPREHEN METABOLIC PANEL: CPT

## 2024-07-09 PROCEDURE — 6370000000 HC RX 637 (ALT 250 FOR IP): Performed by: EMERGENCY MEDICINE

## 2024-07-09 PROCEDURE — 71045 X-RAY EXAM CHEST 1 VIEW: CPT

## 2024-07-09 PROCEDURE — 81003 URINALYSIS AUTO W/O SCOPE: CPT

## 2024-07-09 PROCEDURE — 94640 AIRWAY INHALATION TREATMENT: CPT

## 2024-07-09 PROCEDURE — 2580000003 HC RX 258: Performed by: EMERGENCY MEDICINE

## 2024-07-09 PROCEDURE — 1200000000 HC SEMI PRIVATE

## 2024-07-09 PROCEDURE — 6360000002 HC RX W HCPCS: Performed by: EMERGENCY MEDICINE

## 2024-07-09 PROCEDURE — 87449 NOS EACH ORGANISM AG IA: CPT

## 2024-07-09 PROCEDURE — 6360000002 HC RX W HCPCS: Performed by: NURSE PRACTITIONER

## 2024-07-09 PROCEDURE — 87077 CULTURE AEROBIC IDENTIFY: CPT

## 2024-07-09 PROCEDURE — 99285 EMERGENCY DEPT VISIT HI MDM: CPT

## 2024-07-09 PROCEDURE — 93010 ELECTROCARDIOGRAM REPORT: CPT | Performed by: INTERNAL MEDICINE

## 2024-07-09 PROCEDURE — 87070 CULTURE OTHR SPECIMN AEROBIC: CPT

## 2024-07-09 PROCEDURE — 87186 SC STD MICRODIL/AGAR DIL: CPT

## 2024-07-09 PROCEDURE — 96375 TX/PRO/DX INJ NEW DRUG ADDON: CPT

## 2024-07-09 PROCEDURE — 96374 THER/PROPH/DIAG INJ IV PUSH: CPT

## 2024-07-09 PROCEDURE — 84484 ASSAY OF TROPONIN QUANT: CPT

## 2024-07-09 PROCEDURE — 2580000003 HC RX 258: Performed by: NURSE PRACTITIONER

## 2024-07-09 PROCEDURE — 85025 COMPLETE CBC W/AUTO DIFF WBC: CPT

## 2024-07-09 PROCEDURE — 93005 ELECTROCARDIOGRAM TRACING: CPT | Performed by: EMERGENCY MEDICINE

## 2024-07-09 PROCEDURE — 87899 AGENT NOS ASSAY W/OPTIC: CPT

## 2024-07-09 RX ORDER — BUDESONIDE AND FORMOTEROL FUMARATE DIHYDRATE 160; 4.5 UG/1; UG/1
2 AEROSOL RESPIRATORY (INHALATION)
Status: DISCONTINUED | OUTPATIENT
Start: 2024-07-09 | End: 2024-07-12 | Stop reason: HOSPADM

## 2024-07-09 RX ORDER — CLOPIDOGREL BISULFATE 75 MG/1
75 TABLET ORAL DAILY
Status: DISCONTINUED | OUTPATIENT
Start: 2024-07-10 | End: 2024-07-12 | Stop reason: HOSPADM

## 2024-07-09 RX ORDER — ASPIRIN 81 MG/1
324 TABLET, CHEWABLE ORAL ONCE
Status: COMPLETED | OUTPATIENT
Start: 2024-07-09 | End: 2024-07-09

## 2024-07-09 RX ORDER — LEVOFLOXACIN 500 MG/1
500 TABLET, FILM COATED ORAL ONCE
Status: COMPLETED | OUTPATIENT
Start: 2024-07-09 | End: 2024-07-09

## 2024-07-09 RX ORDER — LEVOFLOXACIN 500 MG/1
500 TABLET, FILM COATED ORAL DAILY
Status: DISCONTINUED | OUTPATIENT
Start: 2024-07-09 | End: 2024-07-09 | Stop reason: DRUGHIGH

## 2024-07-09 RX ORDER — PREDNISONE 20 MG/1
40 TABLET ORAL DAILY
Status: DISCONTINUED | OUTPATIENT
Start: 2024-07-10 | End: 2024-07-12 | Stop reason: HOSPADM

## 2024-07-09 RX ORDER — IPRATROPIUM BROMIDE AND ALBUTEROL SULFATE 2.5; .5 MG/3ML; MG/3ML
1 SOLUTION RESPIRATORY (INHALATION) ONCE
Status: COMPLETED | OUTPATIENT
Start: 2024-07-09 | End: 2024-07-09

## 2024-07-09 RX ORDER — ACETAMINOPHEN 325 MG/1
650 TABLET ORAL ONCE
Status: COMPLETED | OUTPATIENT
Start: 2024-07-09 | End: 2024-07-09

## 2024-07-09 RX ORDER — ACETAMINOPHEN 325 MG/1
650 TABLET ORAL EVERY 6 HOURS PRN
Status: DISCONTINUED | OUTPATIENT
Start: 2024-07-09 | End: 2024-07-12 | Stop reason: HOSPADM

## 2024-07-09 RX ORDER — SODIUM CHLORIDE 0.9 % (FLUSH) 0.9 %
5-40 SYRINGE (ML) INJECTION PRN
Status: DISCONTINUED | OUTPATIENT
Start: 2024-07-09 | End: 2024-07-12 | Stop reason: HOSPADM

## 2024-07-09 RX ORDER — AZITHROMYCIN 250 MG/1
250 TABLET, FILM COATED ORAL DAILY
Status: DISCONTINUED | OUTPATIENT
Start: 2024-07-10 | End: 2024-07-10

## 2024-07-09 RX ORDER — SODIUM CHLORIDE 9 MG/ML
INJECTION, SOLUTION INTRAVENOUS CONTINUOUS
Status: DISCONTINUED | OUTPATIENT
Start: 2024-07-09 | End: 2024-07-10

## 2024-07-09 RX ORDER — SODIUM CHLORIDE 0.9 % (FLUSH) 0.9 %
5-40 SYRINGE (ML) INJECTION EVERY 12 HOURS SCHEDULED
Status: DISCONTINUED | OUTPATIENT
Start: 2024-07-09 | End: 2024-07-12 | Stop reason: HOSPADM

## 2024-07-09 RX ORDER — CALCIUM CARBONATE 500 MG/1
1000 TABLET, CHEWABLE ORAL 3 TIMES DAILY PRN
Status: DISCONTINUED | OUTPATIENT
Start: 2024-07-09 | End: 2024-07-12 | Stop reason: HOSPADM

## 2024-07-09 RX ORDER — GABAPENTIN 600 MG/1
600 TABLET ORAL DAILY
Status: DISCONTINUED | OUTPATIENT
Start: 2024-07-09 | End: 2024-07-10

## 2024-07-09 RX ORDER — ROSUVASTATIN CALCIUM 20 MG/1
20 TABLET, COATED ORAL NIGHTLY
Status: DISCONTINUED | OUTPATIENT
Start: 2024-07-09 | End: 2024-07-12 | Stop reason: HOSPADM

## 2024-07-09 RX ORDER — LEVOFLOXACIN 250 MG/1
250 TABLET, FILM COATED ORAL DAILY
Status: DISCONTINUED | OUTPATIENT
Start: 2024-07-10 | End: 2024-07-10

## 2024-07-09 RX ORDER — POLYETHYLENE GLYCOL 3350 17 G
2 POWDER IN PACKET (EA) ORAL
Status: DISCONTINUED | OUTPATIENT
Start: 2024-07-09 | End: 2024-07-12 | Stop reason: HOSPADM

## 2024-07-09 RX ORDER — BUSPIRONE HYDROCHLORIDE 5 MG/1
5 TABLET ORAL 2 TIMES DAILY
Status: DISCONTINUED | OUTPATIENT
Start: 2024-07-09 | End: 2024-07-12 | Stop reason: HOSPADM

## 2024-07-09 RX ORDER — ACETAMINOPHEN 650 MG/1
650 SUPPOSITORY RECTAL EVERY 6 HOURS PRN
Status: DISCONTINUED | OUTPATIENT
Start: 2024-07-09 | End: 2024-07-12 | Stop reason: HOSPADM

## 2024-07-09 RX ORDER — SENNOSIDES A AND B 8.6 MG/1
1 TABLET, FILM COATED ORAL DAILY PRN
Status: DISCONTINUED | OUTPATIENT
Start: 2024-07-09 | End: 2024-07-12 | Stop reason: HOSPADM

## 2024-07-09 RX ORDER — IPRATROPIUM BROMIDE AND ALBUTEROL SULFATE 2.5; .5 MG/3ML; MG/3ML
1 SOLUTION RESPIRATORY (INHALATION) EVERY 4 HOURS PRN
Status: DISCONTINUED | OUTPATIENT
Start: 2024-07-09 | End: 2024-07-12 | Stop reason: HOSPADM

## 2024-07-09 RX ORDER — BENZONATATE 100 MG/1
100 CAPSULE ORAL 3 TIMES DAILY PRN
Status: DISCONTINUED | OUTPATIENT
Start: 2024-07-09 | End: 2024-07-12 | Stop reason: HOSPADM

## 2024-07-09 RX ORDER — AMLODIPINE BESYLATE 5 MG/1
5 TABLET ORAL DAILY
Status: DISCONTINUED | OUTPATIENT
Start: 2024-07-10 | End: 2024-07-12 | Stop reason: HOSPADM

## 2024-07-09 RX ORDER — SODIUM CHLORIDE 9 MG/ML
INJECTION, SOLUTION INTRAVENOUS PRN
Status: DISCONTINUED | OUTPATIENT
Start: 2024-07-09 | End: 2024-07-12 | Stop reason: HOSPADM

## 2024-07-09 RX ORDER — ONDANSETRON 2 MG/ML
4 INJECTION INTRAMUSCULAR; INTRAVENOUS EVERY 6 HOURS PRN
Status: DISCONTINUED | OUTPATIENT
Start: 2024-07-09 | End: 2024-07-12 | Stop reason: HOSPADM

## 2024-07-09 RX ORDER — MAGNESIUM SULFATE IN WATER 40 MG/ML
2000 INJECTION, SOLUTION INTRAVENOUS ONCE
Status: COMPLETED | OUTPATIENT
Start: 2024-07-10 | End: 2024-07-10

## 2024-07-09 RX ORDER — RANOLAZINE 500 MG/1
500 TABLET, EXTENDED RELEASE ORAL 2 TIMES DAILY
Status: DISCONTINUED | OUTPATIENT
Start: 2024-07-09 | End: 2024-07-12 | Stop reason: HOSPADM

## 2024-07-09 RX ORDER — ENOXAPARIN SODIUM 100 MG/ML
30 INJECTION SUBCUTANEOUS DAILY
Status: DISCONTINUED | OUTPATIENT
Start: 2024-07-09 | End: 2024-07-12 | Stop reason: HOSPADM

## 2024-07-09 RX ORDER — ONDANSETRON 4 MG/1
4 TABLET, ORALLY DISINTEGRATING ORAL EVERY 8 HOURS PRN
Status: DISCONTINUED | OUTPATIENT
Start: 2024-07-09 | End: 2024-07-12 | Stop reason: HOSPADM

## 2024-07-09 RX ADMIN — GABAPENTIN 600 MG: 600 TABLET, FILM COATED ORAL at 20:38

## 2024-07-09 RX ADMIN — ENOXAPARIN SODIUM 30 MG: 100 INJECTION SUBCUTANEOUS at 18:32

## 2024-07-09 RX ADMIN — BUSPIRONE HYDROCHLORIDE 5 MG: 5 TABLET ORAL at 20:39

## 2024-07-09 RX ADMIN — IPRATROPIUM BROMIDE AND ALBUTEROL SULFATE 1 DOSE: .5; 2.5 SOLUTION RESPIRATORY (INHALATION) at 15:08

## 2024-07-09 RX ADMIN — WATER 60 MG: 1 INJECTION INTRAMUSCULAR; INTRAVENOUS; SUBCUTANEOUS at 15:09

## 2024-07-09 RX ADMIN — ACETAMINOPHEN 650 MG: 325 TABLET ORAL at 15:42

## 2024-07-09 RX ADMIN — IPRATROPIUM BROMIDE AND ALBUTEROL SULFATE 1 DOSE: 2.5; .5 SOLUTION RESPIRATORY (INHALATION) at 19:33

## 2024-07-09 RX ADMIN — LEVOFLOXACIN 500 MG: 500 TABLET, FILM COATED ORAL at 18:32

## 2024-07-09 RX ADMIN — ACETAMINOPHEN 650 MG: 325 TABLET ORAL at 22:25

## 2024-07-09 RX ADMIN — ROSUVASTATIN CALCIUM 20 MG: 20 TABLET, FILM COATED ORAL at 20:39

## 2024-07-09 RX ADMIN — SODIUM CHLORIDE: 9 INJECTION, SOLUTION INTRAVENOUS at 18:34

## 2024-07-09 RX ADMIN — ANTACID TABLETS 1000 MG: 500 TABLET, CHEWABLE ORAL at 20:39

## 2024-07-09 RX ADMIN — WATER 1000 MG: 1 INJECTION INTRAMUSCULAR; INTRAVENOUS; SUBCUTANEOUS at 15:56

## 2024-07-09 RX ADMIN — METOPROLOL TARTRATE 25 MG: 25 TABLET, FILM COATED ORAL at 20:38

## 2024-07-09 RX ADMIN — ASPIRIN 324 MG: 81 TABLET, CHEWABLE ORAL at 16:30

## 2024-07-09 RX ADMIN — BUDESONIDE AND FORMOTEROL FUMARATE DIHYDRATE 2 PUFF: 160; 4.5 AEROSOL RESPIRATORY (INHALATION) at 19:33

## 2024-07-09 RX ADMIN — BENZONATATE 100 MG: 100 CAPSULE ORAL at 20:38

## 2024-07-09 RX ADMIN — RANOLAZINE 500 MG: 500 TABLET, FILM COATED, EXTENDED RELEASE ORAL at 20:39

## 2024-07-09 RX ADMIN — AZITHROMYCIN MONOHYDRATE 500 MG: 500 INJECTION, POWDER, LYOPHILIZED, FOR SOLUTION INTRAVENOUS at 16:12

## 2024-07-09 ASSESSMENT — PAIN - FUNCTIONAL ASSESSMENT
PAIN_FUNCTIONAL_ASSESSMENT: NONE - DENIES PAIN
PAIN_FUNCTIONAL_ASSESSMENT: NONE - DENIES PAIN
PAIN_FUNCTIONAL_ASSESSMENT: ACTIVITIES ARE NOT PREVENTED

## 2024-07-09 ASSESSMENT — PAIN DESCRIPTION - DESCRIPTORS: DESCRIPTORS: ACHING

## 2024-07-09 ASSESSMENT — PAIN SCALES - GENERAL
PAINLEVEL_OUTOF10: 0
PAINLEVEL_OUTOF10: 3
PAINLEVEL_OUTOF10: 0

## 2024-07-09 ASSESSMENT — PAIN DESCRIPTION - ORIENTATION: ORIENTATION: MID

## 2024-07-09 ASSESSMENT — PAIN DESCRIPTION - PAIN TYPE: TYPE: ACUTE PAIN

## 2024-07-09 ASSESSMENT — PAIN DESCRIPTION - FREQUENCY: FREQUENCY: INTERMITTENT

## 2024-07-09 ASSESSMENT — PAIN DESCRIPTION - LOCATION: LOCATION: HEAD

## 2024-07-09 ASSESSMENT — PAIN DESCRIPTION - ONSET: ONSET: GRADUAL

## 2024-07-09 NOTE — ED PROVIDER NOTES
Emergency Department Encounter    Patient: Kate Minor  MRN: [id number]  : 1960  Date of Evaluation: 2024  ED Provider:  Freda Fregoso DO    Triage Chief Complaint:   Shortness of Breath    [place]:  Kate Minor is a 63 y.o. female with history of emphysema COPD, oxygen dependence 4 L 5 L around-the-clock, NSTEMI respiratory failure Salas's esophagus hypertension hyperlipidemia that presents to the emergency department via EMS complaining of shortness of breath.  She states she has been using her home albuterol with minimal relief.  She states wheezing crackles and coughing.  States coughing up green-yellow mucus.  Patient states she currently on 5 L oxygen nasal cannula around-the-clock.  Patient tells me she has been feeling short of breath for the past couple of weeks.  She states she has been putting off coming to the emergency department.  She states the symptoms got worse on yesterday and then worse this morning.  She states she could not get her breath or take a deep breath.  She states she been coughing up productive green mucus.  She states fevers and chills the last 2 days.  She states her grand kids are sick as well as her neighbor.  She states she is also had some sore throat runny nose nasal congestion.  She states she quit smoking about 4 months ago.  She states intermittent chest pain no nausea vomiting diarrhea abdominal pain.  Patient here for evaluation    ROS - see HPI, below listed is current ROS at time of my eval:  10 systems reviewed and negative except as above.     Past Medical History:   Diagnosis Date    Salas's esophagus     Elevated troponin 2024    Emphysema of lung (HCC)     H/O Doppler ultrasound carotid 2023    Carotid Moderate (50-69%) disease of the Right proximal Internal carotid artery. Mild (0-49%) disease of the Left mid Internal carotid artery.    H/O echocardiogram 2023    Echo EF 55-60%. Doppler evaluation reveals mild aortic,  0    ranolazine (RANEXA) 500 MG extended release tablet Take 1 tablet by mouth 2 times daily 60 tablet 3    metoprolol tartrate (LOPRESSOR) 25 MG tablet Take 1 tablet by mouth 2 times daily 60 tablet 3    nicotine (NICODERM CQ) 7 MG/24HR Place 1 patch onto the skin daily for 14 days 14 patch 0    clopidogrel (PLAVIX) 75 MG tablet Take 1 tablet by mouth daily 30 tablet 3    tiotropium-olodaterol (STIOLTO RESPIMAT) 2.5-2.5 MCG/ACT AERS Inhale 2 puffs into the lungs daily 1 each 3    albuterol (PROVENTIL) (2.5 MG/3ML) 0.083% nebulizer solution Take 3 mLs by nebulization every 6 hours as needed for Wheezing 120 each 2    omeprazole (PRILOSEC) 40 MG delayed release capsule Take 1 capsule by mouth daily      rosuvastatin (CRESTOR) 20 MG tablet Take 1 tablet by mouth daily      albuterol sulfate HFA (VENTOLIN HFA) 108 (90 BASE) MCG/ACT inhaler Inhale 2 puffs into the lungs every 6 hours as needed for Wheezing 1 Inhaler 0    gabapentin (NEURONTIN) 600 MG tablet Take 1 tablet by mouth daily 90 tablet 0     Allergies   Allergen Reactions    Motrin [Ibuprofen] Nausea Only    Naproxen Other (See Comments)     Makes anxious. 'wired me up'    Robitussin (Alcohol Free) [Guaifenesin] Rash    Theraflu Cold & Cough [Phenylephrine-Pheniramine-Dm] Anxiety       Nursing Notes Reviewed    Physical Exam:  Triage VS:    ED Triage Vitals [07/09/24 1434]   Enc Vitals Group      BP (!) 151/65      Pulse 69      Respirations 20      Temp 98.3 °F (36.8 °C)      Temp Source Oral      SpO2 96 %      Weight - Scale 32.7 kg (72 lb)      Height 1.473 m (4' 10\")      Head Circumference       Peak Flow       Pain Score       Pain Loc       Pain Edu?       Excl. in GC?        My pulse ox interpretation is - normal    General appearance:  No acute distress.  Thin cachectic female  Skin:  Warm. Dry.   Eye:  Extraocular movements intact.     Ears, nose, mouth and throat:  Oral mucosa dry  Neck:  Trachea midline.   Extremity:  No swelling.  Normal ROM    hemoglobin 11.5.  Patient normal sodium potassium kidney function calcium liver enzymes, normal magnesium.  Patient BNP slightly elevated 610.  Patient states she did get some improvement with above breathing treatment.  Patient states she still short of breath and does not feel she can go home.  She states the symptoms are just getting worse.  Patient was seen admission.  Hospitalist was consulted patient discussed in detail they are agreeable to admit patient for further evaluation treatment and management.    History from : Patient    Limitations to history : None    Patient was given the following medications:  Medications   cefTRIAXone (ROCEPHIN) 1,000 mg in sterile water 10 mL IV syringe (has no administration in time range)   azithromycin (ZITHROMAX) 500 mg in sodium chloride 0.9 % 250 mL IVPB (has no administration in time range)   ipratropium 0.5 mg-albuterol 2.5 mg (DUONEB) nebulizer solution 1 Dose (1 Dose Inhalation Given 7/9/24 1508)   ipratropium 0.5 mg-albuterol 2.5 mg (DUONEB) nebulizer solution 1 Dose (1 Dose Inhalation Given 7/9/24 1508)   methylPREDNISolone sodium succ (SOLU-MEDROL) 60 mg in sterile water 0.96 mL injection (60 mg IntraVENous Given 7/9/24 1509)   acetaminophen (TYLENOL) tablet 650 mg (650 mg Oral Given 7/9/24 1542)       Independent Imaging Interpretation by me: Chest x-ray per my interpretation no obvious infiltrates or effusions    EKG (if obtained): (All EKG's are interpreted by myself in the absence of a cardiologist) sinus rhythm, T wave inversions in anterolateral leads, ventricular rate of 67, MN interval 108, QRS duration 66, QT/QTc 398/420, no ST elevation noted.    Chronic conditions affecting care: copd    Social Determinants : None    Records Reviewed : Outpatient Notes patient was seen April 5, 2024 in the emergency department for COPD exacerbation n      Disposition Considerations (tests considered but not done, Shared Decision Making, Pt Expectation of Test or Tx.):

## 2024-07-09 NOTE — PROGRESS NOTES
Pt declined bed alarm. Education given regarding recommendations for safety. Refusal form signed and in chart.

## 2024-07-09 NOTE — PROGRESS NOTES
4 Eyes Skin Assessment     NAME:  Kate Minor  YOB: 1960  MEDICAL RECORD NUMBER:  [id number]    The patient is being assessed for  {Reason for Assessment:03960}    I agree that at least one RN has performed a thorough Head to Toe Skin Assessment on the patient. ALL assessment sites listed below have been assessed.      Areas assessed by both nurses:    Head, Face, Ears, Shoulders, Back, Chest, Arms, Elbows, Hands, Sacrum. Buttock, Coccyx, Ischium, Legs. Feet and Heels, and Under Medical Devices         Does the Patient have a Wound? No noted wound(s)       Navid Prevention initiated by RN: No  Wound Care Orders initiated by RN: No    Pressure Injury (Stage 3,4, Unstageable, DTI, NWPT, and Complex wounds) if present, place Wound referral order by RN under : No    New Ostomies, if present place, Ostomy referral order under : No     Nurse 1 eSignature: Electronically signed by Elizabet Pike RN on 7/9/24 at 6:22 PM EDT    **SHARE this note so that the co-signing nurse can place an eSignature**    Nurse 2 eSignature: {Esignature:[id number]}

## 2024-07-09 NOTE — ED TRIAGE NOTES
Patient presented to ED by EMS with complaints of sob. States does albuterol at home but still has a lot a crackles and cough at times. Patient states she wears 5 liters NC at home.

## 2024-07-09 NOTE — PROGRESS NOTES
Pharmacy Note - Renal Dosing    Levofloxacin  500 mg PO q24h  for treatment of COPD exacerbation. Per [facility] Renal Dose Adjustment Policy, levofloxacin will be changed to  500 mg PO once followed by 250 mg PO q24h.    Estimated Creatinine Clearance: Estimated Creatinine Clearance: 42 mL/min (based on SCr of 0.7 mg/dL).    BMI: Body mass index is 15.05 kg/m².    Please call with any questions.    Thank you,    Angela Milian [facility]

## 2024-07-09 NOTE — H&P
V2.0  History and Physical      Name:  Kate Minor /Age/Sex: 1960  (63 y.o. female)   MRN & CSN:  [id number] & [id number] Encounter Date/Time: 2024 3:58 PM EDT   Location:   PCP: Sharyn Delatorre APRN - CNP       Hospital Day: 1    Assessment and Plan:   Kate Minor is a 63 y.o. female presents with shortness of breath  Hospital Problems             Last Modified POA    * (Principal) COPD with acute exacerbation (HCC) 2024 Yes       Plan:  Acute on Chronic respiratory failure 2/2 acute bronchitis  Acute COPD exacerbation.   -Hx of severe emphysema   -CXR \" findings of emphysema with no consolidation\".    -Baseline oxygen requirement 4-5 L/min nasal cannula.  Respiratory distress upon arrival  -VBG pending  -Continue pulmonary hygiene   -Continue bronchodilators   -Steroids: PO prednisone (received 125 mg Solu-Medrol while ER )  -Pending sputum culture  -Abx: Levaquin (received 1 dose of Rocephin while in the ER)  -Dispo: Admit to Med/Surg     Coronary artery disease  -Recent NSTEMI 3/2024  -LHC 2024.  Diffuse calcified tortuous vessels 40-50% stenosis Ramus/circumflex.  No intervention recommended medical management at that time.  -Continue DAPT/statin  -Troponin elevation likely demand related.  Without chest pain  -EKG no acute ischemic changes    Hypertension   - continue Norvasc/Lopressor    Hyperlipidemia  -Continue statin    Anemia  - H/H 11.5/34.0  - Monitor trends for now    Protein caloric malnutrition  - Will benefit from dietary consultation/nutritional supplementation    Salas's esophagus  -continue PPI    CODE STATUS discussion with patient.  Agreeable to chest compressions/defibrillation/medications but does not want to be intubated or put on mechanical ventilator    Disposition:   Current Living situation: Home  Expected Disposition: Home  Estimated D/C: 2024    Diet Adult diet   DVT Prophylaxis [x] Lovenox, []  Heparin, [] SCDs, [] Ambulation,  [] Eliquis, []  hours.  UA:  Lab Results   Component Value Date/Time    NITRU NEGATIVE 03/16/2024 04:15 AM    COLORU YELLOW 03/16/2024 04:15 AM    PHUR 6.0 03/16/2024 04:15 AM    PHUR 5.5 02/22/2017 03:37 PM    WBCUA 0 TO 1 03/18/2016 03:00 PM    RBCUA NEGATIVE 03/18/2016 03:00 PM    MUCUS 1+ 09/30/2013 05:45 PM    BACTERIA LARGE 03/18/2016 03:00 PM    CLARITYU CLEAR 03/16/2024 04:15 AM    SPECGRAV 1.025 02/22/2017 03:37 PM    LEUKOCYTESUR NEGATIVE 03/16/2024 04:15 AM    UROBILINOGEN 0.2 03/16/2024 04:15 AM    BILIRUBINUR Negative 02/22/2017 03:37 PM    BLOODU NEGATIVE 03/16/2024 04:15 AM    GLUCOSEU Negative 02/22/2017 03:37 PM    KETUA NEGATIVE 03/16/2024 04:15 AM     Urine Cultures: No results found for: \"LABURIN\"  Blood Cultures: No results found for: \"BC\"  No results found for: \"BLOODCULT2\"  Organism: No results found for: \"ORG\"    Imaging/Diagnostics Last 24 Hours   XR CHEST PORTABLE    Result Date: 7/9/2024  Chest X-ray INDICATION: copd sob COMPARISON: 4/5/2024 TECHNIQUE: AP/PA view of the chest was obtained. FINDINGS: The cardiac silhouette is unremarkable. No consolidation, pleural effusion, or pneumothorax is seen. Hyperlucent lungs suggestive of emphysema. The bony structures are intact.     Findings of emphysema with no consolidation. Electronically signed by Esteban Menendez      Electronically signed by KJ Zamora CNP on 7/9/2024 at 3:58 PM

## 2024-07-10 LAB
ANION GAP SERPL CALCULATED.3IONS-SCNC: 10 MMOL/L (ref 7–16)
BASE EXCESS MIXED: 3.7 (ref 0–2)
BASOPHILS ABSOLUTE: 0 K/CU MM
BASOPHILS RELATIVE PERCENT: 0.1 % (ref 0–1)
BUN SERPL-MCNC: 10 MG/DL (ref 6–23)
CALCIUM SERPL-MCNC: 8.3 MG/DL (ref 8.3–10.6)
CHLORIDE BLD-SCNC: 107 MMOL/L (ref 99–110)
CO2: 24 MMOL/L (ref 21–32)
CREAT SERPL-MCNC: 0.5 MG/DL (ref 0.6–1.1)
DIFFERENTIAL TYPE: ABNORMAL
EOSINOPHILS ABSOLUTE: 0 K/CU MM
EOSINOPHILS RELATIVE PERCENT: 0 % (ref 0–3)
GFR, ESTIMATED: >90 ML/MIN/1.73M2
GLUCOSE SERPL-MCNC: 111 MG/DL (ref 70–99)
HCO3 VENOUS: 28.3 MMOL/L (ref 22–29)
HCT VFR BLD CALC: 32.5 % (ref 37–47)
HEMOGLOBIN: 10.7 GM/DL (ref 12.5–16)
IMMATURE NEUTROPHIL %: 0.3 % (ref 0–0.43)
L PNEUMO AG UR QL IA: NEGATIVE
LYMPHOCYTES ABSOLUTE: 0.6 K/CU MM
LYMPHOCYTES RELATIVE PERCENT: 7.9 % (ref 24–44)
MAGNESIUM: 2.3 MG/DL (ref 1.8–2.4)
MCH RBC QN AUTO: 33 PG (ref 27–31)
MCHC RBC AUTO-ENTMCNC: 32.9 % (ref 32–36)
MCV RBC AUTO: 100.3 FL (ref 78–100)
MONOCYTES ABSOLUTE: 0.4 K/CU MM
MONOCYTES RELATIVE PERCENT: 6 % (ref 0–4)
NEUTROPHILS ABSOLUTE: 6.2 K/CU MM
NEUTROPHILS RELATIVE PERCENT: 85.7 % (ref 36–66)
O2 SAT, VEN: 99 % (ref 50–70)
PCO2 VENOUS: 42 MMHG (ref 41–51)
PDW BLD-RTO: 11.4 % (ref 11.7–14.9)
PH VENOUS: 7.44 (ref 7.32–7.43)
PHOSPHORUS: 2.7 MG/DL (ref 2.5–4.9)
PLATELET # BLD: 187 K/CU MM (ref 140–440)
PMV BLD AUTO: 11.5 FL (ref 7.5–11.1)
PO2 VENOUS: 128.9 MMHG (ref 28–48)
POTASSIUM SERPL-SCNC: 3.9 MMOL/L (ref 3.5–5.1)
RBC # BLD: 3.24 M/CU MM (ref 4.2–5.4)
S PNEUM AG CSF QL: NORMAL
SODIUM BLD-SCNC: 141 MMOL/L (ref 135–145)
TOTAL IMMATURE NEUTOROPHIL: 0.02 K/CU MM
WBC # BLD: 7.2 K/CU MM (ref 4–10.5)

## 2024-07-10 PROCEDURE — 6370000000 HC RX 637 (ALT 250 FOR IP): Performed by: NURSE PRACTITIONER

## 2024-07-10 PROCEDURE — 85025 COMPLETE CBC W/AUTO DIFF WBC: CPT

## 2024-07-10 PROCEDURE — 6360000002 HC RX W HCPCS: Performed by: FAMILY MEDICINE

## 2024-07-10 PROCEDURE — 94640 AIRWAY INHALATION TREATMENT: CPT

## 2024-07-10 PROCEDURE — 94761 N-INVAS EAR/PLS OXIMETRY MLT: CPT

## 2024-07-10 PROCEDURE — 84100 ASSAY OF PHOSPHORUS: CPT

## 2024-07-10 PROCEDURE — 82805 BLOOD GASES W/O2 SATURATION: CPT

## 2024-07-10 PROCEDURE — 83735 ASSAY OF MAGNESIUM: CPT

## 2024-07-10 PROCEDURE — 80048 BASIC METABOLIC PNL TOTAL CA: CPT

## 2024-07-10 PROCEDURE — 2580000003 HC RX 258: Performed by: NURSE PRACTITIONER

## 2024-07-10 PROCEDURE — 2700000000 HC OXYGEN THERAPY PER DAY

## 2024-07-10 PROCEDURE — 1200000000 HC SEMI PRIVATE

## 2024-07-10 PROCEDURE — 6360000002 HC RX W HCPCS: Performed by: NURSE PRACTITIONER

## 2024-07-10 RX ORDER — GABAPENTIN 600 MG/1
600 TABLET ORAL
Status: DISCONTINUED | OUTPATIENT
Start: 2024-07-10 | End: 2024-07-12 | Stop reason: HOSPADM

## 2024-07-10 RX ORDER — LEVOFLOXACIN 500 MG/1
500 TABLET, FILM COATED ORAL DAILY
Status: DISCONTINUED | OUTPATIENT
Start: 2024-07-10 | End: 2024-07-10

## 2024-07-10 RX ORDER — PANTOPRAZOLE SODIUM 40 MG/1
40 TABLET, DELAYED RELEASE ORAL
Status: DISCONTINUED | OUTPATIENT
Start: 2024-07-10 | End: 2024-07-12 | Stop reason: HOSPADM

## 2024-07-10 RX ORDER — LEVOFLOXACIN 500 MG/1
500 TABLET, FILM COATED ORAL
Status: DISCONTINUED | OUTPATIENT
Start: 2024-07-10 | End: 2024-07-12 | Stop reason: HOSPADM

## 2024-07-10 RX ORDER — PROMETHAZINE HYDROCHLORIDE AND CODEINE PHOSPHATE 6.25; 1 MG/5ML; MG/5ML
5 SOLUTION ORAL EVERY 8 HOURS PRN
Status: DISCONTINUED | OUTPATIENT
Start: 2024-07-10 | End: 2024-07-12 | Stop reason: HOSPADM

## 2024-07-10 RX ADMIN — ACETAMINOPHEN 650 MG: 325 TABLET ORAL at 09:50

## 2024-07-10 RX ADMIN — GABAPENTIN 600 MG: 600 TABLET, FILM COATED ORAL at 22:58

## 2024-07-10 RX ADMIN — BUDESONIDE AND FORMOTEROL FUMARATE DIHYDRATE 2 PUFF: 160; 4.5 AEROSOL RESPIRATORY (INHALATION) at 20:15

## 2024-07-10 RX ADMIN — MAGNESIUM SULFATE HEPTAHYDRATE 2000 MG: 40 INJECTION, SOLUTION INTRAVENOUS at 00:01

## 2024-07-10 RX ADMIN — CLOPIDOGREL BISULFATE 75 MG: 75 TABLET ORAL at 08:38

## 2024-07-10 RX ADMIN — BUSPIRONE HYDROCHLORIDE 5 MG: 5 TABLET ORAL at 21:08

## 2024-07-10 RX ADMIN — IPRATROPIUM BROMIDE AND ALBUTEROL SULFATE 1 DOSE: 2.5; .5 SOLUTION RESPIRATORY (INHALATION) at 20:14

## 2024-07-10 RX ADMIN — ENOXAPARIN SODIUM 30 MG: 100 INJECTION SUBCUTANEOUS at 08:38

## 2024-07-10 RX ADMIN — PANTOPRAZOLE SODIUM 40 MG: 40 TABLET, DELAYED RELEASE ORAL at 11:44

## 2024-07-10 RX ADMIN — ROSUVASTATIN CALCIUM 20 MG: 20 TABLET, FILM COATED ORAL at 21:08

## 2024-07-10 RX ADMIN — ACETAMINOPHEN 650 MG: 325 TABLET ORAL at 19:48

## 2024-07-10 RX ADMIN — METOPROLOL TARTRATE 25 MG: 25 TABLET, FILM COATED ORAL at 21:08

## 2024-07-10 RX ADMIN — BUSPIRONE HYDROCHLORIDE 5 MG: 5 TABLET ORAL at 08:38

## 2024-07-10 RX ADMIN — METOPROLOL TARTRATE 25 MG: 25 TABLET, FILM COATED ORAL at 08:38

## 2024-07-10 RX ADMIN — RANOLAZINE 500 MG: 500 TABLET, FILM COATED, EXTENDED RELEASE ORAL at 08:38

## 2024-07-10 RX ADMIN — SODIUM CHLORIDE: 9 INJECTION, SOLUTION INTRAVENOUS at 07:17

## 2024-07-10 RX ADMIN — Medication 5 ML: at 09:51

## 2024-07-10 RX ADMIN — LEVOFLOXACIN 500 MG: 500 TABLET, FILM COATED ORAL at 16:27

## 2024-07-10 RX ADMIN — BUDESONIDE AND FORMOTEROL FUMARATE DIHYDRATE 2 PUFF: 160; 4.5 AEROSOL RESPIRATORY (INHALATION) at 07:34

## 2024-07-10 RX ADMIN — AMLODIPINE BESYLATE 5 MG: 5 TABLET ORAL at 08:38

## 2024-07-10 RX ADMIN — SODIUM CHLORIDE, PRESERVATIVE FREE 10 ML: 5 INJECTION INTRAVENOUS at 21:09

## 2024-07-10 RX ADMIN — Medication 5 ML: at 19:48

## 2024-07-10 RX ADMIN — RANOLAZINE 500 MG: 500 TABLET, FILM COATED, EXTENDED RELEASE ORAL at 21:08

## 2024-07-10 RX ADMIN — IPRATROPIUM BROMIDE AND ALBUTEROL SULFATE 1 DOSE: 2.5; .5 SOLUTION RESPIRATORY (INHALATION) at 07:36

## 2024-07-10 RX ADMIN — PREDNISONE 40 MG: 20 TABLET ORAL at 08:38

## 2024-07-10 ASSESSMENT — PAIN SCALES - GENERAL
PAINLEVEL_OUTOF10: 0
PAINLEVEL_OUTOF10: 3
PAINLEVEL_OUTOF10: 3
PAINLEVEL_OUTOF10: 0

## 2024-07-10 ASSESSMENT — PAIN DESCRIPTION - DESCRIPTORS
DESCRIPTORS: ACHING
DESCRIPTORS: ACHING

## 2024-07-10 ASSESSMENT — PAIN DESCRIPTION - ORIENTATION: ORIENTATION: LEFT

## 2024-07-10 ASSESSMENT — PAIN DESCRIPTION - ONSET
ONSET: GRADUAL
ONSET: GRADUAL

## 2024-07-10 ASSESSMENT — PAIN DESCRIPTION - FREQUENCY
FREQUENCY: INTERMITTENT
FREQUENCY: INTERMITTENT

## 2024-07-10 ASSESSMENT — PAIN DESCRIPTION - LOCATION
LOCATION: HEAD
LOCATION: HEAD

## 2024-07-10 ASSESSMENT — PAIN DESCRIPTION - PAIN TYPE
TYPE: ACUTE PAIN
TYPE: ACUTE PAIN

## 2024-07-10 NOTE — PLAN OF CARE
Problem: Pain  Goal: Verbalizes/displays adequate comfort level or baseline comfort level  7/10/2024 1008 by Angus Luo RN  Outcome: Progressing  7/9/2024 2243 by Rika Delatorre RN  Outcome: Progressing     Problem: Safety - Adult  Goal: Free from fall injury  7/10/2024 1008 by Angus Luo, RN  Outcome: Progressing  7/9/2024 2243 by Rika Delatorre RN  Outcome: Progressing     Problem: Chronic Conditions and Co-morbidities  Goal: Patient's chronic conditions and co-morbidity symptoms are monitored and maintained or improved  7/10/2024 1008 by Angus Luo, RN  Outcome: Progressing  7/9/2024 2243 by Rika Delatorre RN  Outcome: Progressing

## 2024-07-10 NOTE — CARE COORDINATION
07/10/24 0700    Service Assessment   Patient Orientation Alert and Oriented   Cognition Alert   History Provided By Patient   Primary Caregiver Self   Support Systems Children   Patient's Healthcare Decision Maker is: Legal Next of Kin   PCP Verified by CM Yes   Prior Functional Level Independent in ADLs/IADLs   Current Functional Level Independent in ADLs/IADLs   Can patient return to prior living arrangement Yes   Ability to make needs known: Good   Family able to assist with home care needs: No   Would you like for me to discuss the discharge plan with any other family members/significant others, and if so, who? No   Financial Resources Medicaid    Community Resources None   Social/Functional History   Lives With Family   Type of Home Apartment   Entrance Stairs - Number of Steps 37   Receives Help From Family   ADL Assistance Independent   Homemaking Assistance Independent   Homemaking Responsibilities Yes   Ambulation Assistance Independent   Transfer Assistance Independent   Active  No   Patient's  Info Son or son's significant other   Discharge Planning   Type of Residence Apartment   Living Arrangements Family Members   Current Services Prior To Admission None   Potential Assistance Purchasing Medications No   Patient expects to be discharged to: Apartment   Services At/After Discharge    Resource Information Provided? No   Mode of Transport at Discharge Other (see comment)  (Family)   Confirm Follow Up Transport Family   Condition of Participation: Discharge Planning   The Plan for Transition of Care is related to the following treatment goals: Patient plans to return home   The Patient and/or Patient Representative was provided with a Choice of Provider? Patient   The Patient and/Or Patient Representative agree with the Discharge Plan? Yes   Freedom of Choice list was provided with basic dialogue that supports the patient's individualized plan of care/goals, treatment preferences, and  shares the quality data associated with the providers?  Yes      CM met with the patient to initiate discharge planning.  Patient lives with her son, her son's significant other, and her grandson in their 3rd story apartment, has medical coverage & PCP, stated that she is independent with ADLs, and is not an active  (son or son's significant other provide transportation as needed).  Patient stated that she does not require the use of any assistive devices but does require home oxygen 24 hours/day (4-5 l/nc).  Patient uses an inhaler and nebulizer machine at home.  Patient advised that she has to climb 37 steps to enter her son's 3rd story apartment and has to stop at each landing and rest on her way up the stairs due to her reduced lung capacity.  Patient advised that she is currently on a waiting list for a subsidized apartment and has been on the waiting list for over 5 months.  Patient plans to return home upon discharge and is unable to identify any needs at this time.  CM available if needs arise.

## 2024-07-10 NOTE — CARE COORDINATION
Received referral from kralie Andrew for possible assistance with nutritional supplements at discharge.  Med Assist is familiar with this patient as we have helped her several times in the past and this year.  She is currently on our do not help list as she has failed to complete an application with our program.      I will follow her and attempt to meet with her tomorrow.  However she MUST complete our application and provide all required documents in order to determine eligibility.  If she qualifies she is only eligible for [employer].  She does have Medicaid, which should cover other nutritional supplements if a prescription is written and her pharmacy offers billing for the order.

## 2024-07-10 NOTE — PROGRESS NOTES
V2.0    [facility] Progress Note      Name:  Kate Minor /Age/Sex: 1960  (63 y.o. female)   MRN & CSN:  [id number] & [id number] Encounter Date/Time: 7/10/2024 9:18 AM EDT   Location:   PCP: Sharyn Delatorre, APRN - CNP     Attending:Pankaj Bowling MD       Hospital Day: 2    Assessment and Recommendations   Kate Minor is a 63 y.o. female shortness of breath      Plan:     Acute on Chronic respiratory failure 2/2 acute bronchitis  Acute COPD exacerbation.   -Hx of severe emphysema   -CXR \" findings of emphysema with no consolidation\".    -Baseline oxygen requirement 4-5 L/min nasal cannula.  Respiratory distress upon arrival  -VBG pending  -Continue pulmonary hygiene   -Continue bronchodilators   - PRN antitussives  -Steroids: PO prednisone (received 125 mg Solu-Medrol while ER )  -Pending sputum culture  -Abx: Levaquin (received 1 dose of Rocephin/azithromycin while in the ER)  -Dispo: Hopefully home in next 24 to 48 hours                Coronary artery disease  -Recent NSTEMI 3/2024  -LHC 2024.  Diffuse calcified tortuous vessels 40-50% stenosis Ramus/circumflex.  No intervention recommended medical management at that time.  -Continue DAPT/statin  -Troponin elevation likely demand related.  Flat declining trend without chest pain  - EKG no acute ischemic changes     Hypertension   - continue Norvasc/Lopressor   -Trends appear controlled    Hyperlipidemia  -Continue statin     Anemia  - H/H 10.7/32.5 appears overall stable  -Continue to monitor trends     Protein caloric malnutrition  - Will benefit from dietary consultation/nutritional supplementation     Salas's esophagus  -continue PPI     CODE STATUS discussion with patient.  Agreeable to chest compressions/defibrillation/medications but does not want to be intubated or put on mechanical ventilator      Diet ADULT DIET; Regular   DVT Prophylaxis [x] Lovenox, []  Heparin, [] SCDs, [] Ambulation,  [] Eliquis, [] Xarelto  []  Coumadin   Code Status Limited   Disposition From: Home  Expected Disposition: Home  Estimated Date of Discharge: 7/11  Patient requires continued admission due to management of above   Surrogate Decision Maker/ POA Son     Personally reviewed Lab Studies and Imaging     Discussed management of the case during IDR's with therapy/case management/dietary  EKG interpreted personally and results as noted above    Imaging that was interpreted personally includes CXR and results as noted above    Drugs that require monitoring for toxicity include Lovenox and the method of monitoring was CBC for worsening anemia    Above plan discussed with attending physician      Subjective:     Chief Complaint: Shortness of breath    Kate Minor is a 63 y.o. female who presents with respiratory distress/shortness of breath  Patient seen examined at bedside.  Discussed plan of care for the day  Reports leg cramps overnight and sore throat due to coughing but feels overall improved.  Continues to have green purulent sputum expectorated.  Denies chest pain  Review of Systems:      Pertinent positives and negatives discussed in HPI    Objective:     Intake/Output Summary (Last 24 hours) at 7/10/2024 0918  Last data filed at 7/10/2024 0205  Gross per 24 hour   Intake 50.35 ml   Output --   Net 50.35 ml      Vitals:   Vitals:    07/09/24 2225 07/09/24 2255 07/10/24 0734 07/10/24 0826   BP:    (!) 150/58   Pulse:    65   Resp: 18 18  18   Temp:    97.3 °F (36.3 °C)   TempSrc:    Oral   SpO2:   99% 100%   Weight:       Height:             Physical Exam:    07/10/24    General: NAD,frail, cachectic  Eyes: EOMI  ENT: neck supple  Cardiovascular: Regular rate. +SM  Respiratory: Diminished/coarse rhonchi.  No wheezes auscultated  Gastrointestinal: Soft, non tender  Genitourinary: no suprapubic tenderness  Musculoskeletal: No edema  Skin: warm, dry  Neuro: Alert.  Psych: Mood appropriate.         Medications:   Medications:    levoFLOXacin  500  Date/Time    LABA1C 5.5 02/05/2024 05:25 AM     TSH:   Lab Results   Component Value Date/Time    TSH 2.50 07/28/2016 02:26 PM    TSH 1.76 11/20/2015 01:50 PM     Troponin:   Lab Results   Component Value Date/Time    TROPONINT <0.010 04/17/2020 11:00 AM     Lactic Acid: No results for input(s): \"LACTA\" in the last 72 hours.  BNP:   Recent Labs     07/09/24  1429   PROBNP 610.0*     UA:  Lab Results   Component Value Date/Time    NITRU NEGATIVE 07/09/2024 06:00 PM    COLORU YELLOW 07/09/2024 06:00 PM    PHUR 6.5 07/09/2024 06:00 PM    PHUR 5.5 02/22/2017 03:37 PM    WBCUA 0 TO 1 03/18/2016 03:00 PM    RBCUA NEGATIVE 03/18/2016 03:00 PM    MUCUS 1+ 09/30/2013 05:45 PM    BACTERIA LARGE 03/18/2016 03:00 PM    CLARITYU CLEAR 07/09/2024 06:00 PM    SPECGRAV 1.025 02/22/2017 03:37 PM    LEUKOCYTESUR NEGATIVE 07/09/2024 06:00 PM    UROBILINOGEN 0.2 07/09/2024 06:00 PM    BILIRUBINUR NEGATIVE 07/09/2024 06:00 PM    BLOODU NEGATIVE 07/09/2024 06:00 PM    GLUCOSEU NEGATIVE 07/09/2024 06:00 PM    KETUA NEGATIVE 07/09/2024 06:00 PM     Urine Cultures: No results found for: \"LABURIN\"  Blood Cultures: No results found for: \"BC\"  No results found for: \"BLOODCULT2\"  Organism: No results found for: \"ORG\"      Electronically signed by KJ Zamora CNP on 7/10/2024 at 9:18 AM

## 2024-07-10 NOTE — PLAN OF CARE
Problem: Pain  Goal: Verbalizes/displays adequate comfort level or baseline comfort level  7/9/2024 2243 by Rika Delatorre RN  Outcome: Progressing  7/9/2024 1859 by Elizabet Pike RN  Outcome: Progressing     Problem: Safety - Adult  Goal: Free from fall injury  7/9/2024 2243 by Rika Delatorre RN  Outcome: Progressing  7/9/2024 1859 by Elizabet Pike RN  Outcome: Progressing     Problem: Chronic Conditions and Co-morbidities  Goal: Patient's chronic conditions and co-morbidity symptoms are monitored and maintained or improved  Outcome: Progressing

## 2024-07-10 NOTE — PROGRESS NOTES
Comprehensive Nutrition Assessment    Type and Reason for Visit:  Initial (Low BMI)    Nutrition Recommendations/Plan:   Continue regular diet  Trial clear oral supplement BID  Encourage intake at all meals, offer alternatives at meals to optimize po intake  Offer snacks between meals as well  Will monitor weights, labs, po intakes and POC     Malnutrition Assessment:  Malnutrition Status:  Severe malnutrition (07/10/24 1433)    Context:  Chronic Illness     Findings of the 6 clinical characteristics of malnutrition:  Energy Intake:  No significant decrease in energy intake  Weight Loss:  No significant weight loss     Body Fat Loss:  Severe body fat loss Orbital, Triceps, Fat Overlying Ribs   Muscle Mass Loss:  Severe muscle mass loss Clavicles (pectoralis & deltoids), Hand (interosseous), Temples (temporalis), Scapula (trapezius)  Fluid Accumulation:  No significant fluid accumulation     Strength:  Not Performed    Nutrition Assessment:    Pt admitted for SOB, acute exacerbation of COPD. Hx includes NSTEMI, HTN, HLD, COPD, TIA, chronic low weight, Hong's esophagus, GERD. Diet order Regular and noted intake of % of breakfast. When speaking with pt she reports no significant change in her appetite recently-she reports she ate a large breakfast and d/t not feeling well and not doing her normal activities like she does at home she may not eat lunch. Encouraged smaller, more frequent meals and snacks when increased SOB noted. Also spoke with pt regarding oral supplements-pt reports \"they are so expensive\" but she is willing to trial clear oral supplement while here. Also spoke to pt regarding Med Assist program for supplements-pt was interested in program. At this time pt does meet criteria for severe malnutrition based on areas of severe muscle and fat depletion noted on physical assessment. At this time will monitor at moderate nutrition risk to ensure adequate oral intake and no further nutrition  Nutrition Focused Physical Findings, Weight    Discharge Planning:    Continue Oral Nutrition Supplement, Continue current diet     Norma Montana RD  Contact: [phone]

## 2024-07-11 LAB
ANION GAP SERPL CALCULATED.3IONS-SCNC: 8 MMOL/L (ref 7–16)
BUN SERPL-MCNC: 9 MG/DL (ref 6–23)
CALCIUM SERPL-MCNC: 8.3 MG/DL (ref 8.3–10.6)
CHLORIDE BLD-SCNC: 108 MMOL/L (ref 99–110)
CO2: 27 MMOL/L (ref 21–32)
CREAT SERPL-MCNC: 0.6 MG/DL (ref 0.6–1.1)
GFR, ESTIMATED: >90 ML/MIN/1.73M2
GLUCOSE SERPL-MCNC: 107 MG/DL (ref 70–99)
MAGNESIUM: 1.9 MG/DL (ref 1.8–2.4)
PHOSPHORUS: 2.8 MG/DL (ref 2.5–4.9)
POTASSIUM SERPL-SCNC: 3.7 MMOL/L (ref 3.5–5.1)
SODIUM BLD-SCNC: 143 MMOL/L (ref 135–145)

## 2024-07-11 PROCEDURE — 94761 N-INVAS EAR/PLS OXIMETRY MLT: CPT

## 2024-07-11 PROCEDURE — 6370000000 HC RX 637 (ALT 250 FOR IP): Performed by: NURSE PRACTITIONER

## 2024-07-11 PROCEDURE — 80048 BASIC METABOLIC PNL TOTAL CA: CPT

## 2024-07-11 PROCEDURE — 84100 ASSAY OF PHOSPHORUS: CPT

## 2024-07-11 PROCEDURE — 6360000002 HC RX W HCPCS: Performed by: NURSE PRACTITIONER

## 2024-07-11 PROCEDURE — 87070 CULTURE OTHR SPECIMN AEROBIC: CPT

## 2024-07-11 PROCEDURE — 87205 SMEAR GRAM STAIN: CPT

## 2024-07-11 PROCEDURE — 83735 ASSAY OF MAGNESIUM: CPT

## 2024-07-11 PROCEDURE — 2580000003 HC RX 258: Performed by: NURSE PRACTITIONER

## 2024-07-11 PROCEDURE — 1200000000 HC SEMI PRIVATE

## 2024-07-11 PROCEDURE — 94640 AIRWAY INHALATION TREATMENT: CPT

## 2024-07-11 RX ADMIN — CLOPIDOGREL BISULFATE 75 MG: 75 TABLET ORAL at 09:15

## 2024-07-11 RX ADMIN — BUSPIRONE HYDROCHLORIDE 5 MG: 5 TABLET ORAL at 09:15

## 2024-07-11 RX ADMIN — SODIUM CHLORIDE, PRESERVATIVE FREE 10 ML: 5 INJECTION INTRAVENOUS at 21:19

## 2024-07-11 RX ADMIN — BUDESONIDE AND FORMOTEROL FUMARATE DIHYDRATE 2 PUFF: 160; 4.5 AEROSOL RESPIRATORY (INHALATION) at 19:48

## 2024-07-11 RX ADMIN — ROSUVASTATIN CALCIUM 20 MG: 20 TABLET, FILM COATED ORAL at 21:19

## 2024-07-11 RX ADMIN — IPRATROPIUM BROMIDE AND ALBUTEROL SULFATE 1 DOSE: 2.5; .5 SOLUTION RESPIRATORY (INHALATION) at 19:48

## 2024-07-11 RX ADMIN — PANTOPRAZOLE SODIUM 40 MG: 40 TABLET, DELAYED RELEASE ORAL at 05:53

## 2024-07-11 RX ADMIN — AMLODIPINE BESYLATE 5 MG: 5 TABLET ORAL at 09:15

## 2024-07-11 RX ADMIN — METOPROLOL TARTRATE 25 MG: 25 TABLET, FILM COATED ORAL at 09:15

## 2024-07-11 RX ADMIN — BUSPIRONE HYDROCHLORIDE 5 MG: 5 TABLET ORAL at 21:18

## 2024-07-11 RX ADMIN — ACETAMINOPHEN 650 MG: 325 TABLET ORAL at 06:03

## 2024-07-11 RX ADMIN — RANOLAZINE 500 MG: 500 TABLET, FILM COATED, EXTENDED RELEASE ORAL at 21:19

## 2024-07-11 RX ADMIN — PREDNISONE 40 MG: 20 TABLET ORAL at 09:15

## 2024-07-11 RX ADMIN — Medication 5 ML: at 06:03

## 2024-07-11 RX ADMIN — LEVOFLOXACIN 500 MG: 500 TABLET, FILM COATED ORAL at 14:15

## 2024-07-11 RX ADMIN — METOPROLOL TARTRATE 25 MG: 25 TABLET, FILM COATED ORAL at 21:18

## 2024-07-11 RX ADMIN — BUDESONIDE AND FORMOTEROL FUMARATE DIHYDRATE 2 PUFF: 160; 4.5 AEROSOL RESPIRATORY (INHALATION) at 07:40

## 2024-07-11 RX ADMIN — GABAPENTIN 600 MG: 600 TABLET, FILM COATED ORAL at 23:08

## 2024-07-11 RX ADMIN — RANOLAZINE 500 MG: 500 TABLET, FILM COATED, EXTENDED RELEASE ORAL at 09:15

## 2024-07-11 RX ADMIN — SODIUM CHLORIDE, PRESERVATIVE FREE 10 ML: 5 INJECTION INTRAVENOUS at 13:09

## 2024-07-11 RX ADMIN — Medication 5 ML: at 14:16

## 2024-07-11 RX ADMIN — ACETAMINOPHEN 650 MG: 325 TABLET ORAL at 14:15

## 2024-07-11 RX ADMIN — ENOXAPARIN SODIUM 30 MG: 100 INJECTION SUBCUTANEOUS at 09:16

## 2024-07-11 ASSESSMENT — PAIN DESCRIPTION - LOCATION
LOCATION: HEAD
LOCATION: HEAD

## 2024-07-11 ASSESSMENT — PAIN DESCRIPTION - ONSET
ONSET: GRADUAL
ONSET: GRADUAL

## 2024-07-11 ASSESSMENT — PAIN DESCRIPTION - PAIN TYPE
TYPE: ACUTE PAIN
TYPE: ACUTE PAIN

## 2024-07-11 ASSESSMENT — PAIN SCALES - GENERAL
PAINLEVEL_OUTOF10: 0
PAINLEVEL_OUTOF10: 3
PAINLEVEL_OUTOF10: 3
PAINLEVEL_OUTOF10: 0

## 2024-07-11 ASSESSMENT — PAIN DESCRIPTION - DESCRIPTORS
DESCRIPTORS: SORE
DESCRIPTORS: ACHING

## 2024-07-11 ASSESSMENT — PAIN DESCRIPTION - FREQUENCY
FREQUENCY: INTERMITTENT
FREQUENCY: INTERMITTENT

## 2024-07-11 ASSESSMENT — PAIN DESCRIPTION - ORIENTATION
ORIENTATION: ANTERIOR
ORIENTATION: ANTERIOR;POSTERIOR

## 2024-07-11 NOTE — PLAN OF CARE
Problem: Pain  Goal: Verbalizes/displays adequate comfort level or baseline comfort level  7/11/2024 1022 by Angus Luo RN  Outcome: Progressing  7/11/2024 0640 by Kemi Hollingsworth RN  Outcome: Progressing     Problem: Safety - Adult  Goal: Free from fall injury  7/11/2024 1022 by Angus Luo RN  Outcome: Progressing  7/11/2024 0640 by Kemi Hollingsworth RN  Outcome: Progressing     Problem: Chronic Conditions and Co-morbidities  Goal: Patient's chronic conditions and co-morbidity symptoms are monitored and maintained or improved  7/11/2024 1022 by Angus Luo RN  Outcome: Progressing  7/11/2024 0640 by Kemi Hollingsworth RN  Outcome: Progressing     Problem: Nutrition Deficit:  Goal: Optimize nutritional status  7/11/2024 1022 by Angus Luo, RN  Outcome: Progressing  7/11/2024 0640 by Kemi Hollingsworth RN  Outcome: Progressing

## 2024-07-11 NOTE — PROGRESS NOTES
This RN has reviewed and agrees with Amalia Humphreys LPN's data collection and has collaborated with this LPN regarding the patient's care plan.

## 2024-07-11 NOTE — PROGRESS NOTES
V2.0    [facility] [place] Progress Note      Name:  Kate Minor /Age/Sex: 1960  (63 y.o. female)   MRN & CSN:  [id number] & [id number] Encounter Date/Time: 2024 9:18 AM EDT   Location:   PCP: Sharyn Delatorre APRN - CNP     Attending:Pankaj Bowling MD       Hospital Day: 3    Assessment and Recommendations   Kate Minor is a 63 y.o. female shortness of breath      Plan:     Acute on Chronic respiratory failure 2/2 acute bronchitis  Acute COPD exacerbation.   -Hx of severe emphysema   -CXR \" findings of emphysema with no consolidation\".    -Baseline oxygen requirement 4-5 L/min nasal cannula.  Respiratory distress upon arrival now improved and currently on 3 L/min  -VBG pH 7.44  -Continue pulmonary hygiene   -Continue bronchodilators   - PRN antitussives  -Steroids: PO prednisone (received 125 mg Solu-Medrol while ER )  -Pending sputum culture  -Abx: Levaquin (received 1 dose of Rocephin/azithromycin while in the ER) day 2  -Dispo: Will likely be able to discharge in a.m.                Coronary artery disease  -Recent NSTEMI 3/2024  -LHC 2024.  Diffuse calcified tortuous vessels 40-50% stenosis Ramus/circumflex.  No intervention recommended medical management at that time.  -Continue DAPT/statin  -Troponin elevation likely demand related.  Flat declining trend without chest pain  - EKG no acute ischemic changes     Hypertension   - continue Norvasc/Lopressor   -Trends appear controlled    Hyperlipidemia  -Continue statin     Anemia  - H/H 10.7/32.5 appears overall stable  -Continue to monitor trends     Protein caloric malnutrition  - Will benefit from dietary consultation/nutritional supplementation     Salas's esophagus  -continue PPI     CODE STATUS discussion with patient.  Agreeable to chest compressions/defibrillation/medications but does not want to be intubated or put on mechanical ventilator      Diet ADULT DIET; Regular  ADULT ORAL NUTRITION SUPPLEMENT; Breakfast, Dinner;

## 2024-07-12 VITALS
HEART RATE: 60 BPM | DIASTOLIC BLOOD PRESSURE: 61 MMHG | WEIGHT: 78.2 LBS | BODY MASS INDEX: 16.41 KG/M2 | TEMPERATURE: 97.7 F | HEIGHT: 58 IN | RESPIRATION RATE: 18 BRPM | SYSTOLIC BLOOD PRESSURE: 142 MMHG | OXYGEN SATURATION: 99 %

## 2024-07-12 LAB
ANION GAP SERPL CALCULATED.3IONS-SCNC: 9 MMOL/L (ref 7–16)
BUN SERPL-MCNC: 11 MG/DL (ref 6–23)
CALCIUM SERPL-MCNC: 9 MG/DL (ref 8.3–10.6)
CHLORIDE BLD-SCNC: 105 MMOL/L (ref 99–110)
CO2: 27 MMOL/L (ref 21–32)
CREAT SERPL-MCNC: 0.6 MG/DL (ref 0.6–1.1)
GFR, ESTIMATED: >90 ML/MIN/1.73M2
GLUCOSE SERPL-MCNC: 101 MG/DL (ref 70–99)
HCT VFR BLD CALC: 36.6 % (ref 37–47)
HEMOGLOBIN: 11.9 GM/DL (ref 12.5–16)
MAGNESIUM: 2 MG/DL (ref 1.8–2.4)
MCH RBC QN AUTO: 32.2 PG (ref 27–31)
MCHC RBC AUTO-ENTMCNC: 32.5 % (ref 32–36)
MCV RBC AUTO: 99.2 FL (ref 78–100)
PDW BLD-RTO: 11.6 % (ref 11.7–14.9)
PHOSPHORUS: 3 MG/DL (ref 2.5–4.9)
PLATELET # BLD: 234 K/CU MM (ref 140–440)
PMV BLD AUTO: 11.3 FL (ref 7.5–11.1)
POTASSIUM SERPL-SCNC: 3.6 MMOL/L (ref 3.5–5.1)
RBC # BLD: 3.69 M/CU MM (ref 4.2–5.4)
SODIUM BLD-SCNC: 141 MMOL/L (ref 135–145)
WBC # BLD: 12.7 K/CU MM (ref 4–10.5)

## 2024-07-12 PROCEDURE — 6360000002 HC RX W HCPCS: Performed by: NURSE PRACTITIONER

## 2024-07-12 PROCEDURE — 2580000003 HC RX 258: Performed by: NURSE PRACTITIONER

## 2024-07-12 PROCEDURE — 94761 N-INVAS EAR/PLS OXIMETRY MLT: CPT

## 2024-07-12 PROCEDURE — 84100 ASSAY OF PHOSPHORUS: CPT

## 2024-07-12 PROCEDURE — 93005 ELECTROCARDIOGRAM TRACING: CPT | Performed by: PHYSICIAN ASSISTANT

## 2024-07-12 PROCEDURE — 6370000000 HC RX 637 (ALT 250 FOR IP): Performed by: NURSE PRACTITIONER

## 2024-07-12 PROCEDURE — 80048 BASIC METABOLIC PNL TOTAL CA: CPT

## 2024-07-12 PROCEDURE — 83735 ASSAY OF MAGNESIUM: CPT

## 2024-07-12 PROCEDURE — 94640 AIRWAY INHALATION TREATMENT: CPT

## 2024-07-12 PROCEDURE — 85027 COMPLETE CBC AUTOMATED: CPT

## 2024-07-12 PROCEDURE — 2700000000 HC OXYGEN THERAPY PER DAY

## 2024-07-12 RX ORDER — LEVOFLOXACIN 750 MG/1
750 TABLET, FILM COATED ORAL DAILY
Qty: 5 TABLET | Refills: 0 | Status: SHIPPED | OUTPATIENT
Start: 2024-07-12 | End: 2024-07-17

## 2024-07-12 RX ORDER — BUDESONIDE AND FORMOTEROL FUMARATE DIHYDRATE 160; 4.5 UG/1; UG/1
2 AEROSOL RESPIRATORY (INHALATION)
Qty: 10.2 G | Refills: 3 | Status: SHIPPED | OUTPATIENT
Start: 2024-07-12

## 2024-07-12 RX ORDER — BENZONATATE 100 MG/1
100 CAPSULE ORAL 3 TIMES DAILY PRN
Qty: 21 CAPSULE | Refills: 0 | Status: SHIPPED | OUTPATIENT
Start: 2024-07-12 | End: 2024-07-19

## 2024-07-12 RX ORDER — PREDNISONE 20 MG/1
40 TABLET ORAL DAILY
Qty: 4 TABLET | Refills: 0 | Status: SHIPPED | OUTPATIENT
Start: 2024-07-13 | End: 2024-07-15

## 2024-07-12 RX ADMIN — BUSPIRONE HYDROCHLORIDE 5 MG: 5 TABLET ORAL at 08:03

## 2024-07-12 RX ADMIN — RANOLAZINE 500 MG: 500 TABLET, FILM COATED, EXTENDED RELEASE ORAL at 08:03

## 2024-07-12 RX ADMIN — ACETAMINOPHEN 650 MG: 325 TABLET ORAL at 05:46

## 2024-07-12 RX ADMIN — SODIUM CHLORIDE, PRESERVATIVE FREE 10 ML: 5 INJECTION INTRAVENOUS at 08:03

## 2024-07-12 RX ADMIN — AMLODIPINE BESYLATE 5 MG: 5 TABLET ORAL at 08:03

## 2024-07-12 RX ADMIN — PANTOPRAZOLE SODIUM 40 MG: 40 TABLET, DELAYED RELEASE ORAL at 05:31

## 2024-07-12 RX ADMIN — ENOXAPARIN SODIUM 30 MG: 100 INJECTION SUBCUTANEOUS at 08:03

## 2024-07-12 RX ADMIN — PREDNISONE 40 MG: 20 TABLET ORAL at 08:03

## 2024-07-12 RX ADMIN — LEVOFLOXACIN 500 MG: 500 TABLET, FILM COATED ORAL at 14:18

## 2024-07-12 RX ADMIN — BUDESONIDE AND FORMOTEROL FUMARATE DIHYDRATE 2 PUFF: 160; 4.5 AEROSOL RESPIRATORY (INHALATION) at 07:50

## 2024-07-12 RX ADMIN — CLOPIDOGREL BISULFATE 75 MG: 75 TABLET ORAL at 08:03

## 2024-07-12 RX ADMIN — IPRATROPIUM BROMIDE AND ALBUTEROL SULFATE 1 DOSE: 2.5; .5 SOLUTION RESPIRATORY (INHALATION) at 14:09

## 2024-07-12 RX ADMIN — METOPROLOL TARTRATE 25 MG: 25 TABLET, FILM COATED ORAL at 08:00

## 2024-07-12 RX ADMIN — Medication 5 ML: at 05:47

## 2024-07-12 ASSESSMENT — PAIN DESCRIPTION - PAIN TYPE: TYPE: ACUTE PAIN

## 2024-07-12 ASSESSMENT — PAIN DESCRIPTION - ONSET: ONSET: ON-GOING

## 2024-07-12 ASSESSMENT — PAIN DESCRIPTION - LOCATION: LOCATION: CHEST

## 2024-07-12 ASSESSMENT — PAIN SCALES - GENERAL
PAINLEVEL_OUTOF10: 3
PAINLEVEL_OUTOF10: 0
PAINLEVEL_OUTOF10: 2

## 2024-07-12 ASSESSMENT — PAIN - FUNCTIONAL ASSESSMENT: PAIN_FUNCTIONAL_ASSESSMENT: ACTIVITIES ARE NOT PREVENTED

## 2024-07-12 ASSESSMENT — PAIN DESCRIPTION - ORIENTATION: ORIENTATION: LEFT;LOWER

## 2024-07-12 ASSESSMENT — PAIN DESCRIPTION - FREQUENCY: FREQUENCY: CONTINUOUS

## 2024-07-12 ASSESSMENT — PAIN DESCRIPTION - DESCRIPTORS: DESCRIPTORS: ACHING

## 2024-07-12 NOTE — DISCHARGE SUMMARY
V2.0  Discharge Summary    Name:  Kate Minor /Age/Sex: 1960 (63 y.o. female)   Admit Date: 2024  Discharge Date: 24    MRN & CSN:  [id number] & [id number] Encounter Date and Time 24 12:53 PM EDT    Attending:  Juan Daniel Lara MD Discharging Provider: Mary Kate Infante PA-C       Hospital Course:     Brief HPI: Kate Minor is a 63 y.o. female who presented with shortness of breath,     Problems addressed during this hospitalization:     Acute on Chronic respiratory failure, improved  Acute COPD exacerbation, improved  - presented in respiratory distress  -Hx of severe emphysema   -CXR \" findings of emphysema with no consolidation\".    -Patient is symptomatically improved, on baseline oxygen requirement of 3 L nasal cannula, lungs clear on exam  -Continue home bronchodilators.  Sent prescription refill for Stiolto.  - PRN antitussives  -Steroids: PO prednisone x 5 days (received 125 mg Solu-Medrol while ER )  - sputum culture with rare growth of Pseudomonas aeruginosa.  Will continue Levaquin to complete 7-day course.  Will follow-up sensitivities on discharge.                Nonobstructive CAD  -Recent NSTEMI 3/2024  - has occasional atypical chest pain worsened with cough  -C 2024.  Diffuse calcified tortuous vessels 40-50% stenosis Ramus/circumflex.  No intervention recommended medical management at that time.  -Continue DAPT/statin  -Troponin elevation likely demand related.  Flat declining trend  - EKG x2 no acute ischemic changes     Hypertension   - continue Norvasc/Lopressor   -Trends appear controlled     Hyperlipidemia  -Continue statin     Anemia  - H/H 10.7/32.5 appears overall stable  -Continue to monitor trends     Sever protein calorie malnutrition  - dietitian followed   - continue supplements     Salas's esophagus  -continue PPI    This patient was discussed in conjunction with Dr. Lara. He was agreeable with patient's plan at discharge as dictated above.  suggestive of emphysema. The bony structures are intact.     Findings of emphysema with no consolidation. Electronically signed by Esteban Menendez      CBC:   Recent Labs     07/09/24  1429 07/10/24  0520 07/12/24  0545   WBC 7.5 7.2 12.7*   HGB 11.5* 10.7* 11.9*    187 234     BMP:    Recent Labs     07/10/24  0520 07/11/24  0600 07/12/24  0545    143 141   K 3.9 3.7 3.6    108 105   CO2 24 27 27   BUN 10 9 11   CREATININE 0.5* 0.6 0.6   GLUCOSE 111* 107* 101*     Hepatic:   Recent Labs     07/09/24  1429   AST 21   ALT 14   BILITOT 0.4   ALKPHOS 70     Lipids:   Lab Results   Component Value Date/Time    CHOL 192 01/24/2024 03:12 AM    CHOL 165 03/21/2023 12:00 AM    HDL 83 01/24/2024 03:12 AM    TRIG 39 01/24/2024 03:12 AM     Hemoglobin A1C:   Lab Results   Component Value Date/Time    LABA1C 5.5 02/05/2024 05:25 AM     TSH:   Lab Results   Component Value Date/Time    TSH 2.50 07/28/2016 02:26 PM    TSH 1.76 11/20/2015 01:50 PM     Troponin:   Lab Results   Component Value Date/Time    TROPONINT <0.010 04/17/2020 11:00 AM     Lactic Acid: No results for input(s): \"LACTA\" in the last 72 hours.  BNP:   Recent Labs     07/09/24  1429   PROBNP 610.0*     UA:  Lab Results   Component Value Date/Time    NITRU NEGATIVE 07/09/2024 06:00 PM    COLORU YELLOW 07/09/2024 06:00 PM    PHUR 6.5 07/09/2024 06:00 PM    PHUR 5.5 02/22/2017 03:37 PM    WBCUA 0 TO 1 03/18/2016 03:00 PM    RBCUA NEGATIVE 03/18/2016 03:00 PM    MUCUS 1+ 09/30/2013 05:45 PM    BACTERIA LARGE 03/18/2016 03:00 PM    CLARITYU CLEAR 07/09/2024 06:00 PM    SPECGRAV 1.025 02/22/2017 03:37 PM    LEUKOCYTESUR NEGATIVE 07/09/2024 06:00 PM    UROBILINOGEN 0.2 07/09/2024 06:00 PM    BILIRUBINUR NEGATIVE 07/09/2024 06:00 PM    BLOODU NEGATIVE 07/09/2024 06:00 PM    GLUCOSEU NEGATIVE 07/09/2024 06:00 PM    KETUA NEGATIVE 07/09/2024 06:00 PM     Urine Cultures: No results found for: \"LABURIN\"  Blood Cultures: No results found for: \"BC\"  No results

## 2024-07-12 NOTE — PLAN OF CARE
Problem: Pain  Goal: Verbalizes/displays adequate comfort level or baseline comfort level  7/12/2024 0943 by Angus Luo RN  Outcome: Adequate for Discharge  7/12/2024 0943 by Angus Luo RN  Outcome: Progressing  7/11/2024 2316 by Lizzy Lance RN  Outcome: Progressing     Problem: Safety - Adult  Goal: Free from fall injury  7/12/2024 0943 by Angus Luo RN  Outcome: Adequate for Discharge  7/12/2024 0943 by Angus Luo RN  Outcome: Progressing  7/11/2024 2316 by Lizzy Lance RN  Outcome: Progressing     Problem: Chronic Conditions and Co-morbidities  Goal: Patient's chronic conditions and co-morbidity symptoms are monitored and maintained or improved  7/12/2024 0943 by Angus Luo RN  Outcome: Adequate for Discharge  7/12/2024 0943 by Angus Luo RN  Outcome: Progressing  7/11/2024 2316 by Lizzy Lance RN  Outcome: Progressing     Problem: Nutrition Deficit:  Goal: Optimize nutritional status  7/12/2024 0943 by Angus Luo RN  Outcome: Adequate for Discharge  7/12/2024 0943 by Angus Luo RN  Outcome: Progressing  7/11/2024 2316 by Lizzy Lance RN  Outcome: Progressing

## 2024-07-12 NOTE — PLAN OF CARE
Problem: Pain  Goal: Verbalizes/displays adequate comfort level or baseline comfort level  7/11/2024 2316 by Lizzy Lance RN  Outcome: Progressing  7/11/2024 1022 by Angus Luo RN  Outcome: Progressing     Problem: Safety - Adult  Goal: Free from fall injury  7/11/2024 2316 by Lizzy Lance RN  Outcome: Progressing  7/11/2024 1022 by Angus Luo RN  Outcome: Progressing     Problem: Chronic Conditions and Co-morbidities  Goal: Patient's chronic conditions and co-morbidity symptoms are monitored and maintained or improved  7/11/2024 2316 by Lizzy Lance RN  Outcome: Progressing  7/11/2024 1022 by Angus Luo RN  Outcome: Progressing     Problem: Nutrition Deficit:  Goal: Optimize nutritional status  7/11/2024 2316 by Lizzy Lance RN  Outcome: Progressing  7/11/2024 1022 by Angus Luo RN  Outcome: Progressing

## 2024-07-12 NOTE — DISCHARGE INSTRUCTIONS
Your culture of your sputum grew a small amount of a bacteria call Pseudomonas. This bacteria is common in patient's with COPD. You have been continued on Levaquin for a total of 7 days, which is an antibiotic that will treat Pseudomonas. Finish the antibiotic, even if you are feeling better.     Establish with a lung doctor as soon as possible.     You have been prescribed Symbicort (budesonide-formoterol) which is a controlling inhaler which should be covered by your insurance. If you have trouble with cost, talk to your primary care provider or lung doctor for other options.

## 2024-07-13 LAB
CULTURE: NORMAL
EKG ATRIAL RATE: 66 BPM
EKG DIAGNOSIS: NORMAL
EKG P AXIS: 87 DEGREES
EKG P-R INTERVAL: 108 MS
EKG Q-T INTERVAL: 424 MS
EKG QRS DURATION: 70 MS
EKG QTC CALCULATION (BAZETT): 444 MS
EKG R AXIS: 69 DEGREES
EKG T AXIS: 198 DEGREES
EKG VENTRICULAR RATE: 66 BPM
GRAM SMEAR: NORMAL
Lab: NORMAL
SPECIMEN: NORMAL

## 2024-07-13 PROCEDURE — 93010 ELECTROCARDIOGRAM REPORT: CPT | Performed by: INTERNAL MEDICINE

## 2024-07-14 LAB
CULTURE: ABNORMAL
CULTURE: ABNORMAL
GRAM SMEAR: ABNORMAL
Lab: ABNORMAL
SPECIMEN: ABNORMAL

## 2024-07-15 NOTE — CARE COORDINATION
Patient discharged over weekend prior to being able to do an intake for nutritional supplement assistance.  She does have medicaid which will cover ONS if order is written.

## 2024-07-16 ENCOUNTER — TELEPHONE (OUTPATIENT)
Dept: PULMONOLOGY | Age: 64
End: 2024-07-16

## 2024-07-25 NOTE — PROGRESS NOTES
Physician Progress Note      PATIENT:               GISELLE HAZEL  [facility] #:                  [id number]  :                       1960  ADMIT DATE:       2024 2:31 PM  DISCH DATE:        2024 4:40 PM  RESPONDING  PROVIDER #:        AMY FONTAINE          QUERY TEXT:    Patient admitted with acute exacerbation of COPD . Noted documentation of   acute on chronic respiratory failure in H/P on 24 and 7/10/24-24   Progress Notes and 24 D/C Summary. In order to support the diagnosis of   acute respiratory failure, please include additional clinical indicators in   your documentation.  Or please document if the diagnosis of acute respiratory   failure has been ruled out after further study.    The medical record reflects the following:    Risk Factors: 63 y.o. female with history of emphysema COPD, oxygen dependence   4-5 L \"around-the-clock\", recent NSTEMI  on 3/24, chronic respiratory   failure, Salas's esophagus, HTN and HLD presents with shortness of breath   and productive cough    Clinical Indicators: Per Oxygen flowsheet, patient maintained SpO2  % on   on O2 3L/min via nc throughout admission.  Per  Lab results pH, Larry...7.44...pCO2, Larry...42.0...PO2,   Larry...128.9...Base Exc, Mixed...3.7...O2 Sat, Larry 99.0   ED Provider Note \"Patient states she currently on 5 L oxygen nasal cannula   around-the-clock.  Patient tells me she has been feeling short of breath for   the past couple of weeks...Respiratory...Coarse productive cough, expiratory   wheezing, crackles in the lungs, increased work of breathing\"   H/P \"history of COPD with chronic respiratory failure on 4 L at   baseline...Respiratory status improved...No increased work on   breathing...Baseline oxygen requirement 4-5 L/min nasal cannula.  Respiratory   distress upon arrival.\"   D/C Summary \"Acute on Chronic respiratory failure, improved...presented   in respiratory distress...Patient is symptomatically

## 2024-08-28 ENCOUNTER — TELEPHONE (OUTPATIENT)
Dept: PULMONOLOGY | Age: 64
End: 2024-08-28

## 2024-08-28 ENCOUNTER — TELEPHONE (OUTPATIENT)
Dept: CARDIOLOGY CLINIC | Age: 64
End: 2024-08-28

## 2024-08-28 NOTE — TELEPHONE ENCOUNTER
Left messages on 8/26/2024 at 0830, 8/27/2024 at 0818, and 8/28/2024 at 099 for patient to schedule consult per PCP.

## 2024-08-29 ENCOUNTER — OFFICE VISIT (OUTPATIENT)
Dept: PULMONOLOGY | Age: 64
End: 2024-08-29
Payer: COMMERCIAL

## 2024-08-29 VITALS
OXYGEN SATURATION: 100 % | WEIGHT: 74 LBS | BODY MASS INDEX: 15.54 KG/M2 | HEART RATE: 95 BPM | DIASTOLIC BLOOD PRESSURE: 66 MMHG | RESPIRATION RATE: 14 BRPM | HEIGHT: 58 IN | SYSTOLIC BLOOD PRESSURE: 134 MMHG

## 2024-08-29 DIAGNOSIS — R13.19 DYSPHAGIA CAUSING PULMONARY ASPIRATION WITH SWALLOWING: ICD-10-CM

## 2024-08-29 DIAGNOSIS — J44.1 COPD WITH ACUTE EXACERBATION (HCC): Primary | ICD-10-CM

## 2024-08-29 DIAGNOSIS — J96.01 ACUTE HYPOXEMIC RESPIRATORY FAILURE (HCC): ICD-10-CM

## 2024-08-29 DIAGNOSIS — Z72.0 TOBACCO USE: ICD-10-CM

## 2024-08-29 PROCEDURE — 3078F DIAST BP <80 MM HG: CPT | Performed by: NURSE PRACTITIONER

## 2024-08-29 PROCEDURE — 99214 OFFICE O/P EST MOD 30 MIN: CPT | Performed by: NURSE PRACTITIONER

## 2024-08-29 PROCEDURE — 3075F SYST BP GE 130 - 139MM HG: CPT | Performed by: NURSE PRACTITIONER

## 2024-08-29 RX ORDER — HYDROXYZINE HYDROCHLORIDE 25 MG/1
25 TABLET, FILM COATED ORAL 3 TIMES DAILY PRN
COMMUNITY

## 2024-08-29 RX ORDER — ROPINIROLE 1 MG/1
1 TABLET, FILM COATED ORAL 3 TIMES DAILY
COMMUNITY

## 2024-08-29 RX ORDER — DEXLANSOPRAZOLE 30 MG/1
30 CAPSULE, DELAYED RELEASE ORAL DAILY
COMMUNITY

## 2024-08-29 RX ORDER — CYCLOBENZAPRINE HCL 5 MG
5 TABLET ORAL 3 TIMES DAILY PRN
COMMUNITY

## 2024-08-29 ASSESSMENT — SLEEP AND FATIGUE QUESTIONNAIRES
HOW LIKELY ARE YOU TO NOD OFF OR FALL ASLEEP IN A CAR, WHILE STOPPED FOR A FEW MINUTES IN TRAFFIC: WOULD NEVER DOZE
ESS TOTAL SCORE: 8
HOW LIKELY ARE YOU TO NOD OFF OR FALL ASLEEP WHEN YOU ARE A PASSENGER IN A CAR FOR AN HOUR WITHOUT A BREAK: HIGH CHANCE OF DOZING
HOW LIKELY ARE YOU TO NOD OFF OR FALL ASLEEP WHILE SITTING AND READING: SLIGHT CHANCE OF DOZING
HOW LIKELY ARE YOU TO NOD OFF OR FALL ASLEEP WHILE SITTING QUIETLY AFTER LUNCH WITHOUT ALCOHOL: WOULD NEVER DOZE
HOW LIKELY ARE YOU TO NOD OFF OR FALL ASLEEP WHILE SITTING AND TALKING TO SOMEONE: WOULD NEVER DOZE
HOW LIKELY ARE YOU TO NOD OFF OR FALL ASLEEP WHILE WATCHING TV: MODERATE CHANCE OF DOZING
HOW LIKELY ARE YOU TO NOD OFF OR FALL ASLEEP WHILE SITTING INACTIVE IN A PUBLIC PLACE: SLIGHT CHANCE OF DOZING
HOW LIKELY ARE YOU TO NOD OFF OR FALL ASLEEP WHILE LYING DOWN TO REST IN THE AFTERNOON WHEN CIRCUMSTANCES PERMIT: SLIGHT CHANCE OF DOZING

## 2024-08-29 ASSESSMENT — ENCOUNTER SYMPTOMS
SHORTNESS OF BREATH: 1
WHEEZING: 1
COUGH: 1

## 2024-08-29 NOTE — PATIENT INSTRUCTIONS
Continue using the nebulizer   Continue using Symbicort twice a day. Take two puffs in the morning, take two puffs in the evening.     Use Nystatin after you rinse your mouth out.   Yupelri: Use once a day in the morning in your nebulizer.   Do not mix  Yupelri with any Albuterol.

## 2024-08-29 NOTE — PROGRESS NOTES
Kate Minor (:  1960) is a 63 y.o. female,Established patient, here for evaluation of the following chief complaint(s):  Establish Care    Subjective   SUBJECTIVE/OBJECTIVE:  Kate 64 yo female has returned for follow up post ED visit  2024 for acute respiratory failure and hypoxemia. She was sent home with Stiolto, but it is not helping to relieve shortness of breath. She brought Symbicort to her visit today. She states that it helps more than Stiolto. She states that Yupelri helped her better and she liked it. Kate reports feeling scared that she one day she will not be able to breathe. She wakes up in the middle of the night and cannot catch her her breath. She is oxygen dependent on 3 lpm oxygen and 4 at night sometimes. She requires use of home oxygen at 4 lpm. She is mobile in the home and requires portable oxygen due to dx of emphysema with hypoxemia.     She has course coughing that is producing thick green yellow mucus. She is being treated with Levaquin. She reports that her son and his wife smokes around her.  I am adding Yupelri to her daily bronchodilaotr therapy with Symbcrot. She had been taking  one puff of Symbicort because she wasn't able to rinse her mouth out. I discussed the correct dose and frequency of Symbicort is 2 puffs in the morning and 2 puffs at night.     Referral sent to Dr. Collins to follow up on swallowing because of recurrent lungs infections. Kate reports that swallowing feel so bad that she can feel pockets foods lingering on the left side of her throat. PFT to measure lung function.  She is a smoker and is trying to quit. She still takes puffs on cigarettes when stresses.     Review of Systems   Constitutional:  Negative for appetite change.   Respiratory:  Positive for cough, shortness of breath and wheezing.         Thick yellow and green mucus. Oxygen use on 4 liters continuous    All other systems reviewed and are negative.     Objective   Physical

## 2024-09-09 ENCOUNTER — TELEPHONE (OUTPATIENT)
Dept: GASTROENTEROLOGY | Age: 64
End: 2024-09-09

## 2024-09-16 ENCOUNTER — TELEPHONE (OUTPATIENT)
Dept: GASTROENTEROLOGY | Age: 64
End: 2024-09-16

## 2024-09-23 ENCOUNTER — TELEPHONE (OUTPATIENT)
Dept: GASTROENTEROLOGY | Age: 64
End: 2024-09-23

## 2024-10-16 PROBLEM — E78.5 DYSLIPIDEMIA: Status: ACTIVE | Noted: 2024-10-16

## 2025-06-05 ENCOUNTER — HOSPITAL ENCOUNTER (INPATIENT)
Age: 65
LOS: 4 days | Discharge: NURSING, REHAB OR LONG-TERM CARE | DRG: 190 | End: 2025-06-10
Attending: EMERGENCY MEDICINE | Admitting: INTERNAL MEDICINE
Payer: MEDICARE

## 2025-06-05 ENCOUNTER — APPOINTMENT (OUTPATIENT)
Dept: GENERAL RADIOLOGY | Age: 65
DRG: 190 | End: 2025-06-05
Payer: MEDICARE

## 2025-06-05 ENCOUNTER — APPOINTMENT (OUTPATIENT)
Dept: CT IMAGING | Age: 65
DRG: 190 | End: 2025-06-05
Payer: MEDICARE

## 2025-06-05 ENCOUNTER — PARAMEDICINE (OUTPATIENT)
Dept: OTHER | Age: 65
End: 2025-06-05

## 2025-06-05 DIAGNOSIS — F41.9 ANXIETY: Primary | ICD-10-CM

## 2025-06-05 DIAGNOSIS — E87.6 HYPOKALEMIA: ICD-10-CM

## 2025-06-05 DIAGNOSIS — R09.02 HYPOXEMIA: ICD-10-CM

## 2025-06-05 DIAGNOSIS — G25.0 ESSENTIAL TREMOR: ICD-10-CM

## 2025-06-05 DIAGNOSIS — J44.1 ACUTE EXACERBATION OF CHRONIC OBSTRUCTIVE PULMONARY DISEASE (COPD) (HCC): ICD-10-CM

## 2025-06-05 DIAGNOSIS — J44.1 COPD EXACERBATION (HCC): ICD-10-CM

## 2025-06-05 DIAGNOSIS — G25.81 RESTLESS LEG: ICD-10-CM

## 2025-06-05 LAB
ALBUMIN SERPL-MCNC: 2.6 G/DL (ref 3.4–5)
ALBUMIN SERPL-MCNC: 3.2 G/DL (ref 3.4–5)
ALBUMIN/GLOB SERPL: 1 {RATIO}
ALBUMIN/GLOB SERPL: 1 {RATIO}
ALP SERPL-CCNC: 103 U/L (ref 40–129)
ALP SERPL-CCNC: 126 U/L (ref 40–129)
ALT SERPL-CCNC: 16 U/L (ref 10–40)
ALT SERPL-CCNC: 22 U/L (ref 10–40)
ANION GAP SERPL CALCULATED.3IONS-SCNC: 10 MMOL/L (ref 9–17)
ANION GAP SERPL CALCULATED.3IONS-SCNC: 12 MMOL/L (ref 9–17)
AST SERPL-CCNC: 25 U/L (ref 15–37)
AST SERPL-CCNC: 29 U/L (ref 15–37)
B PARAP IS1001 DNA NPH QL NAA+NON-PROBE: NOT DETECTED
B PERT DNA SPEC QL NAA+PROBE: NOT DETECTED
BASOPHILS # BLD: 0.07 K/UL
BASOPHILS NFR BLD: 1 % (ref 0–1)
BILIRUB SERPL-MCNC: 0.3 MG/DL (ref 0–1)
BILIRUB SERPL-MCNC: <0.2 MG/DL (ref 0–1)
BNP SERPL-MCNC: 2054 PG/ML (ref 0–125)
BUN SERPL-MCNC: 8 MG/DL (ref 7–20)
BUN SERPL-MCNC: 9 MG/DL (ref 7–20)
C PNEUM DNA NPH QL NAA+NON-PROBE: NOT DETECTED
CA-I BLD-SCNC: 1.13 MMOL/L (ref 1.15–1.33)
CALCIUM SERPL-MCNC: 7.5 MG/DL (ref 8.3–10.6)
CALCIUM SERPL-MCNC: 8.5 MG/DL (ref 8.3–10.6)
CHLORIDE SERPL-SCNC: 95 MMOL/L (ref 99–110)
CHLORIDE SERPL-SCNC: 97 MMOL/L (ref 99–110)
CO2 SERPL-SCNC: 29 MMOL/L (ref 21–32)
CO2 SERPL-SCNC: 36 MMOL/L (ref 21–32)
CREAT SERPL-MCNC: 1 MG/DL (ref 0.6–1.2)
CREAT SERPL-MCNC: 1.2 MG/DL (ref 0.6–1.2)
EKG ATRIAL RATE: 81 BPM
EKG DIAGNOSIS: NORMAL
EKG P AXIS: 83 DEGREES
EKG P-R INTERVAL: 108 MS
EKG Q-T INTERVAL: 428 MS
EKG QRS DURATION: 76 MS
EKG QTC CALCULATION (BAZETT): 497 MS
EKG R AXIS: 61 DEGREES
EKG T AXIS: 237 DEGREES
EKG VENTRICULAR RATE: 81 BPM
EOSINOPHIL # BLD: 0.11 K/UL
EOSINOPHILS RELATIVE PERCENT: 1 % (ref 0–3)
ERYTHROCYTE [DISTWIDTH] IN BLOOD BY AUTOMATED COUNT: 12.8 % (ref 11.7–14.9)
FLUAV RNA NPH QL NAA+NON-PROBE: NOT DETECTED
FLUBV RNA NPH QL NAA+NON-PROBE: NOT DETECTED
GFR, ESTIMATED: 51 ML/MIN/1.73M2
GFR, ESTIMATED: 63 ML/MIN/1.73M2
GLUCOSE SERPL-MCNC: 124 MG/DL (ref 74–99)
GLUCOSE SERPL-MCNC: 161 MG/DL (ref 74–99)
HADV DNA NPH QL NAA+NON-PROBE: NOT DETECTED
HCOV 229E RNA NPH QL NAA+NON-PROBE: NOT DETECTED
HCOV HKU1 RNA NPH QL NAA+NON-PROBE: NOT DETECTED
HCOV NL63 RNA NPH QL NAA+NON-PROBE: NOT DETECTED
HCOV OC43 RNA NPH QL NAA+NON-PROBE: NOT DETECTED
HCT VFR BLD AUTO: 34.7 % (ref 37–47)
HGB BLD-MCNC: 11 G/DL (ref 12.5–16)
HMPV RNA NPH QL NAA+NON-PROBE: DETECTED
HPIV1 RNA NPH QL NAA+NON-PROBE: NOT DETECTED
HPIV2 RNA NPH QL NAA+NON-PROBE: NOT DETECTED
HPIV3 RNA NPH QL NAA+NON-PROBE: NOT DETECTED
HPIV4 RNA NPH QL NAA+NON-PROBE: NOT DETECTED
IMM GRANULOCYTES # BLD AUTO: 0.05 K/UL
IMM GRANULOCYTES NFR BLD: 1 %
IRON SATN MFR SERPL: 14 % (ref 15–50)
IRON SERPL-MCNC: 40 UG/DL (ref 37–145)
LYMPHOCYTES NFR BLD: 0.65 K/UL
LYMPHOCYTES RELATIVE PERCENT: 7 % (ref 24–44)
M PNEUMO DNA NPH QL NAA+NON-PROBE: NOT DETECTED
MAGNESIUM SERPL-MCNC: 1.7 MG/DL (ref 1.8–2.4)
MCH RBC QN AUTO: 30.5 PG (ref 27–31)
MCHC RBC AUTO-ENTMCNC: 31.7 G/DL (ref 32–36)
MCV RBC AUTO: 96.1 FL (ref 78–100)
MONOCYTES NFR BLD: 0.71 K/UL
MONOCYTES NFR BLD: 8 % (ref 0–5)
NEUTROPHILS NFR BLD: 82 % (ref 36–66)
NEUTS SEG NFR BLD: 7.49 K/UL
PLATELET # BLD AUTO: 227 K/UL (ref 140–440)
PMV BLD AUTO: 11.2 FL (ref 7.5–11.1)
POTASSIUM SERPL-SCNC: 2.6 MMOL/L (ref 3.5–5.1)
POTASSIUM SERPL-SCNC: 3.1 MMOL/L (ref 3.5–5.1)
PROT SERPL-MCNC: 5.2 G/DL (ref 6.4–8.2)
PROT SERPL-MCNC: 6.4 G/DL (ref 6.4–8.2)
RBC # BLD AUTO: 3.61 M/UL (ref 4.2–5.4)
RSV RNA NPH QL NAA+NON-PROBE: NOT DETECTED
RV+EV RNA NPH QL NAA+NON-PROBE: NOT DETECTED
SARS-COV-2 RNA NPH QL NAA+NON-PROBE: NOT DETECTED
SODIUM SERPL-SCNC: 138 MMOL/L (ref 136–145)
SODIUM SERPL-SCNC: 141 MMOL/L (ref 136–145)
SPECIMEN DESCRIPTION: ABNORMAL
TIBC SERPL-MCNC: 297 UG/DL (ref 260–445)
TROPONIN I SERPL HS-MCNC: 28 NG/L (ref 0–14)
TROPONIN I SERPL HS-MCNC: 33 NG/L (ref 0–14)
UNSATURATED IRON BINDING CAPACITY: 257 UG/DL (ref 110–370)
WBC OTHER # BLD: 9.1 K/UL (ref 4–10.5)

## 2025-06-05 PROCEDURE — 6370000000 HC RX 637 (ALT 250 FOR IP): Performed by: EMERGENCY MEDICINE

## 2025-06-05 PROCEDURE — 2500000003 HC RX 250 WO HCPCS: Performed by: NURSE PRACTITIONER

## 2025-06-05 PROCEDURE — 6360000004 HC RX CONTRAST MEDICATION: Performed by: EMERGENCY MEDICINE

## 2025-06-05 PROCEDURE — 94761 N-INVAS EAR/PLS OXIMETRY MLT: CPT

## 2025-06-05 PROCEDURE — 93005 ELECTROCARDIOGRAM TRACING: CPT | Performed by: EMERGENCY MEDICINE

## 2025-06-05 PROCEDURE — 94640 AIRWAY INHALATION TREATMENT: CPT

## 2025-06-05 PROCEDURE — 96366 THER/PROPH/DIAG IV INF ADDON: CPT

## 2025-06-05 PROCEDURE — 84484 ASSAY OF TROPONIN QUANT: CPT

## 2025-06-05 PROCEDURE — G0378 HOSPITAL OBSERVATION PER HR: HCPCS

## 2025-06-05 PROCEDURE — 85025 COMPLETE CBC W/AUTO DIFF WBC: CPT

## 2025-06-05 PROCEDURE — 2500000003 HC RX 250 WO HCPCS: Performed by: EMERGENCY MEDICINE

## 2025-06-05 PROCEDURE — 96361 HYDRATE IV INFUSION ADD-ON: CPT

## 2025-06-05 PROCEDURE — 96375 TX/PRO/DX INJ NEW DRUG ADDON: CPT

## 2025-06-05 PROCEDURE — 94664 DEMO&/EVAL PT USE INHALER: CPT

## 2025-06-05 PROCEDURE — 2700000000 HC OXYGEN THERAPY PER DAY

## 2025-06-05 PROCEDURE — 2580000003 HC RX 258

## 2025-06-05 PROCEDURE — 83735 ASSAY OF MAGNESIUM: CPT

## 2025-06-05 PROCEDURE — 6370000000 HC RX 637 (ALT 250 FOR IP): Performed by: NURSE PRACTITIONER

## 2025-06-05 PROCEDURE — 0202U NFCT DS 22 TRGT SARS-COV-2: CPT

## 2025-06-05 PROCEDURE — 83540 ASSAY OF IRON: CPT

## 2025-06-05 PROCEDURE — 96365 THER/PROPH/DIAG IV INF INIT: CPT

## 2025-06-05 PROCEDURE — 71045 X-RAY EXAM CHEST 1 VIEW: CPT

## 2025-06-05 PROCEDURE — 83550 IRON BINDING TEST: CPT

## 2025-06-05 PROCEDURE — 82330 ASSAY OF CALCIUM: CPT

## 2025-06-05 PROCEDURE — 93010 ELECTROCARDIOGRAM REPORT: CPT | Performed by: INTERNAL MEDICINE

## 2025-06-05 PROCEDURE — 83880 ASSAY OF NATRIURETIC PEPTIDE: CPT

## 2025-06-05 PROCEDURE — 71275 CT ANGIOGRAPHY CHEST: CPT

## 2025-06-05 PROCEDURE — 36415 COLL VENOUS BLD VENIPUNCTURE: CPT

## 2025-06-05 PROCEDURE — 80053 COMPREHEN METABOLIC PANEL: CPT

## 2025-06-05 PROCEDURE — 99285 EMERGENCY DEPT VISIT HI MDM: CPT

## 2025-06-05 PROCEDURE — 6360000002 HC RX W HCPCS: Performed by: EMERGENCY MEDICINE

## 2025-06-05 PROCEDURE — 2580000003 HC RX 258: Performed by: EMERGENCY MEDICINE

## 2025-06-05 RX ORDER — ACETAMINOPHEN 650 MG/1
650 SUPPOSITORY RECTAL EVERY 6 HOURS PRN
Status: DISCONTINUED | OUTPATIENT
Start: 2025-06-05 | End: 2025-06-10 | Stop reason: HOSPADM

## 2025-06-05 RX ORDER — ROSUVASTATIN CALCIUM 20 MG/1
20 TABLET, COATED ORAL
Status: DISCONTINUED | OUTPATIENT
Start: 2025-06-05 | End: 2025-06-10

## 2025-06-05 RX ORDER — CLOPIDOGREL BISULFATE 75 MG/1
75 TABLET ORAL DAILY
Status: DISCONTINUED | OUTPATIENT
Start: 2025-06-05 | End: 2025-06-10 | Stop reason: HOSPADM

## 2025-06-05 RX ORDER — SODIUM CHLORIDE 9 MG/ML
INJECTION, SOLUTION INTRAVENOUS
Status: COMPLETED
Start: 2025-06-05 | End: 2025-06-05

## 2025-06-05 RX ORDER — SODIUM CHLORIDE 9 MG/ML
INJECTION, SOLUTION INTRAVENOUS CONTINUOUS
Status: DISCONTINUED | OUTPATIENT
Start: 2025-06-05 | End: 2025-06-05

## 2025-06-05 RX ORDER — IPRATROPIUM BROMIDE AND ALBUTEROL SULFATE 2.5; .5 MG/3ML; MG/3ML
1 SOLUTION RESPIRATORY (INHALATION) ONCE
Status: COMPLETED | OUTPATIENT
Start: 2025-06-05 | End: 2025-06-05

## 2025-06-05 RX ORDER — ROPINIROLE 1 MG/1
1 TABLET, FILM COATED ORAL NIGHTLY
Status: DISCONTINUED | OUTPATIENT
Start: 2025-06-05 | End: 2025-06-10 | Stop reason: HOSPADM

## 2025-06-05 RX ORDER — ACETAMINOPHEN 500 MG
1000 TABLET ORAL ONCE
Status: COMPLETED | OUTPATIENT
Start: 2025-06-05 | End: 2025-06-05

## 2025-06-05 RX ORDER — PREDNISONE 20 MG/1
40 TABLET ORAL
Status: COMPLETED | OUTPATIENT
Start: 2025-06-05 | End: 2025-06-09

## 2025-06-05 RX ORDER — POLYETHYLENE GLYCOL 3350 17 G/17G
17 POWDER, FOR SOLUTION ORAL DAILY PRN
Status: DISCONTINUED | OUTPATIENT
Start: 2025-06-05 | End: 2025-06-10 | Stop reason: HOSPADM

## 2025-06-05 RX ORDER — ONDANSETRON 2 MG/ML
4 INJECTION INTRAMUSCULAR; INTRAVENOUS EVERY 6 HOURS PRN
Status: DISCONTINUED | OUTPATIENT
Start: 2025-06-05 | End: 2025-06-05

## 2025-06-05 RX ORDER — IOPAMIDOL 755 MG/ML
85 INJECTION, SOLUTION INTRAVASCULAR
Status: COMPLETED | OUTPATIENT
Start: 2025-06-05 | End: 2025-06-05

## 2025-06-05 RX ORDER — POTASSIUM CHLORIDE 1500 MG/1
40 TABLET, EXTENDED RELEASE ORAL ONCE
Status: COMPLETED | OUTPATIENT
Start: 2025-06-05 | End: 2025-06-05

## 2025-06-05 RX ORDER — POTASSIUM CHLORIDE 7.45 MG/ML
10 INJECTION INTRAVENOUS ONCE
Status: COMPLETED | OUTPATIENT
Start: 2025-06-05 | End: 2025-06-05

## 2025-06-05 RX ORDER — SODIUM CHLORIDE 0.9 % (FLUSH) 0.9 %
5-40 SYRINGE (ML) INJECTION EVERY 12 HOURS SCHEDULED
Status: DISCONTINUED | OUTPATIENT
Start: 2025-06-05 | End: 2025-06-10 | Stop reason: HOSPADM

## 2025-06-05 RX ORDER — PANTOPRAZOLE SODIUM 40 MG/1
40 TABLET, DELAYED RELEASE ORAL
Status: DISCONTINUED | OUTPATIENT
Start: 2025-06-06 | End: 2025-06-05

## 2025-06-05 RX ORDER — SODIUM CHLORIDE 9 MG/ML
250 INJECTION, SOLUTION INTRAVENOUS PRN
Status: DISCONTINUED | OUTPATIENT
Start: 2025-06-05 | End: 2025-06-10 | Stop reason: HOSPADM

## 2025-06-05 RX ORDER — IPRATROPIUM BROMIDE AND ALBUTEROL SULFATE 2.5; .5 MG/3ML; MG/3ML
1 SOLUTION RESPIRATORY (INHALATION)
Status: DISCONTINUED | OUTPATIENT
Start: 2025-06-05 | End: 2025-06-10 | Stop reason: HOSPADM

## 2025-06-05 RX ORDER — SODIUM CHLORIDE 0.9 % (FLUSH) 0.9 %
5-40 SYRINGE (ML) INJECTION PRN
Status: DISCONTINUED | OUTPATIENT
Start: 2025-06-05 | End: 2025-06-10 | Stop reason: HOSPADM

## 2025-06-05 RX ORDER — ACETAMINOPHEN 325 MG/1
650 TABLET ORAL EVERY 6 HOURS PRN
Status: DISCONTINUED | OUTPATIENT
Start: 2025-06-05 | End: 2025-06-10 | Stop reason: HOSPADM

## 2025-06-05 RX ORDER — ONDANSETRON 4 MG/1
4 TABLET, ORALLY DISINTEGRATING ORAL EVERY 8 HOURS PRN
Status: DISCONTINUED | OUTPATIENT
Start: 2025-06-05 | End: 2025-06-05

## 2025-06-05 RX ORDER — AZITHROMYCIN 250 MG/1
500 TABLET, FILM COATED ORAL DAILY
Status: DISCONTINUED | OUTPATIENT
Start: 2025-06-05 | End: 2025-06-05

## 2025-06-05 RX ORDER — ENOXAPARIN SODIUM 100 MG/ML
40 INJECTION SUBCUTANEOUS DAILY
Status: DISCONTINUED | OUTPATIENT
Start: 2025-06-05 | End: 2025-06-06

## 2025-06-05 RX ORDER — 0.9 % SODIUM CHLORIDE 0.9 %
500 INTRAVENOUS SOLUTION INTRAVENOUS ONCE
Status: COMPLETED | OUTPATIENT
Start: 2025-06-05 | End: 2025-06-05

## 2025-06-05 RX ORDER — GABAPENTIN 600 MG/1
600 TABLET ORAL DAILY
Status: DISCONTINUED | OUTPATIENT
Start: 2025-06-05 | End: 2025-06-10 | Stop reason: HOSPADM

## 2025-06-05 RX ORDER — METOPROLOL TARTRATE 25 MG/1
25 TABLET, FILM COATED ORAL 2 TIMES DAILY
Status: DISCONTINUED | OUTPATIENT
Start: 2025-06-05 | End: 2025-06-10 | Stop reason: HOSPADM

## 2025-06-05 RX ORDER — DOXYCYCLINE HYCLATE 100 MG
100 TABLET ORAL EVERY 12 HOURS SCHEDULED
Status: DISPENSED | OUTPATIENT
Start: 2025-06-05 | End: 2025-06-08

## 2025-06-05 RX ORDER — AMLODIPINE BESYLATE 5 MG/1
5 TABLET ORAL DAILY
Status: DISCONTINUED | OUTPATIENT
Start: 2025-06-05 | End: 2025-06-10 | Stop reason: HOSPADM

## 2025-06-05 RX ORDER — PANTOPRAZOLE SODIUM 40 MG/1
40 TABLET, DELAYED RELEASE ORAL
Status: DISCONTINUED | OUTPATIENT
Start: 2025-06-06 | End: 2025-06-10 | Stop reason: HOSPADM

## 2025-06-05 RX ADMIN — POTASSIUM CHLORIDE 10 MEQ: 7.46 INJECTION, SOLUTION INTRAVENOUS at 06:48

## 2025-06-05 RX ADMIN — IPRATROPIUM BROMIDE AND ALBUTEROL SULFATE 1 DOSE: .5; 2.5 SOLUTION RESPIRATORY (INHALATION) at 11:15

## 2025-06-05 RX ADMIN — SODIUM CHLORIDE, PRESERVATIVE FREE 10 ML: 5 INJECTION INTRAVENOUS at 19:35

## 2025-06-05 RX ADMIN — SODIUM CHLORIDE: 9 INJECTION, SOLUTION INTRAVENOUS at 06:53

## 2025-06-05 RX ADMIN — WATER 60 MG: 1 INJECTION INTRAMUSCULAR; INTRAVENOUS; SUBCUTANEOUS at 05:43

## 2025-06-05 RX ADMIN — POTASSIUM CHLORIDE 40 MEQ: 1500 TABLET, EXTENDED RELEASE ORAL at 17:31

## 2025-06-05 RX ADMIN — ROSUVASTATIN CALCIUM 20 MG: 20 TABLET, FILM COATED ORAL at 19:32

## 2025-06-05 RX ADMIN — ROPINIROLE 1 MG: 1 TABLET, FILM COATED ORAL at 21:03

## 2025-06-05 RX ADMIN — ACETAMINOPHEN 1000 MG: 500 TABLET ORAL at 07:51

## 2025-06-05 RX ADMIN — SODIUM CHLORIDE 500 ML: 9 INJECTION, SOLUTION INTRAVENOUS at 09:49

## 2025-06-05 RX ADMIN — IPRATROPIUM BROMIDE AND ALBUTEROL SULFATE 1 DOSE: .5; 2.5 SOLUTION RESPIRATORY (INHALATION) at 19:49

## 2025-06-05 RX ADMIN — POTASSIUM CHLORIDE 40 MEQ: 1500 TABLET, EXTENDED RELEASE ORAL at 11:48

## 2025-06-05 RX ADMIN — IPRATROPIUM BROMIDE AND ALBUTEROL SULFATE 1 DOSE: .5; 2.5 SOLUTION RESPIRATORY (INHALATION) at 17:45

## 2025-06-05 RX ADMIN — IPRATROPIUM BROMIDE AND ALBUTEROL SULFATE 1 DOSE: 2.5; .5 SOLUTION RESPIRATORY (INHALATION) at 05:14

## 2025-06-05 RX ADMIN — ACETAMINOPHEN 650 MG: 325 TABLET ORAL at 19:32

## 2025-06-05 RX ADMIN — PREDNISONE 40 MG: 20 TABLET ORAL at 17:31

## 2025-06-05 RX ADMIN — IOPAMIDOL 85 ML: 755 INJECTION, SOLUTION INTRAVENOUS at 07:19

## 2025-06-05 ASSESSMENT — PAIN SCALES - GENERAL
PAINLEVEL_OUTOF10: 0
PAINLEVEL_OUTOF10: 8
PAINLEVEL_OUTOF10: 6
PAINLEVEL_OUTOF10: 2

## 2025-06-05 ASSESSMENT — PAIN - FUNCTIONAL ASSESSMENT
PAIN_FUNCTIONAL_ASSESSMENT: ACTIVITIES ARE NOT PREVENTED
PAIN_FUNCTIONAL_ASSESSMENT: ACTIVITIES ARE NOT PREVENTED
PAIN_FUNCTIONAL_ASSESSMENT: 0-10

## 2025-06-05 ASSESSMENT — PAIN DESCRIPTION - ONSET: ONSET: GRADUAL

## 2025-06-05 ASSESSMENT — PAIN DESCRIPTION - LOCATION
LOCATION: BREAST
LOCATION: HEAD

## 2025-06-05 ASSESSMENT — PAIN DESCRIPTION - PAIN TYPE: TYPE: ACUTE PAIN

## 2025-06-05 ASSESSMENT — PAIN DESCRIPTION - ORIENTATION
ORIENTATION: LEFT
ORIENTATION: UPPER

## 2025-06-05 ASSESSMENT — PAIN DESCRIPTION - DESCRIPTORS: DESCRIPTORS: ACHING

## 2025-06-05 ASSESSMENT — PAIN DESCRIPTION - FREQUENCY: FREQUENCY: INTERMITTENT

## 2025-06-05 NOTE — ED PROVIDER NOTES
Emergency Department Encounter  Location: [facility] EMERGENCY DEPARTMENT    Patient: Kate Minor  MRN: [id number]  : 1960  Date of evaluation: 2025  ED Provider: Isacc Henriquez DO    07:00a.m.  Kate Minor was checked out to me by Dr. Isabel. Please see his/her initial documentation for details of the patient's initial ED presentation, physical exam and completed studies.    In brief, Kate Minor is a 64 y.o. female that presented to the emergency department with shortness of breath.  She has history of COPD and is normally on 3 L of oxygen but has been putting it up to 4 to 5 L.  She has increased dyspnea upon exertion.  No fevers but had some chills she has had a cough that is wet but nonproductive.  At shift change the patient had laboratory workup and CTA pending.  She was having some pleuritic chest pain according to the night doctor.    I have reviewed and interpreted all of the currently available lab results and diagnostics from this visit:  Results for orders placed or performed during the hospital encounter of 25   CBC with Auto Differential   Result Value Ref Range    WBC 9.1 4.0 - 10.5 k/uL    RBC 3.61 (L) 4.20 - 5.40 m/uL    Hemoglobin 11.0 (L) 12.5 - 16.0 g/dL    Hematocrit 34.7 (L) 37.0 - 47.0 %    MCV 96.1 78.0 - 100.0 fL    MCH 30.5 27.0 - 31.0 pg    MCHC 31.7 (L) 32.0 - 36.0 g/dL    RDW 12.8 11.7 - 14.9 %    Platelets 227 140 - 440 k/uL    MPV 11.2 (H) 7.5 - 11.1 fL    Neutrophils % 82 (H) 36 - 66 %    Lymphocytes % 7 (L) 24 - 44 %    Monocytes % 8 (H) 0 - 5 %    Eosinophils % 1 0 - 3 %    Basophils % 1 0 - 1 %    Immature Granulocytes % 1 (H) 0 %    Neutrophils Absolute 7.49 k/uL    Lymphocytes Absolute 0.65 k/uL    Monocytes Absolute 0.71 k/uL    Eosinophils Absolute 0.11 k/uL    Basophils Absolute 0.07 k/uL    Immature Granulocytes Absolute 0.05 k/uL   Brain Natriuretic Peptide   Result Value Ref Range    NT Pro-BNP 2,054 (H) 0 - 125 pg/mL   Troponin   Result Value 
alert and oriented to person, place, and time.         MDM:    Labs Reviewed   CBC WITH AUTO DIFFERENTIAL - Abnormal; Notable for the following components:       Result Value    RBC 3.61 (*)     Hemoglobin 11.0 (*)     Hematocrit 34.7 (*)     MCHC 31.7 (*)     MPV 11.2 (*)     Neutrophils % 82 (*)     Lymphocytes % 7 (*)     Monocytes % 8 (*)     Immature Granulocytes % 1 (*)     All other components within normal limits   BRAIN NATRIURETIC PEPTIDE   TROPONIN   SPECIMEN REJECTION   SPECIMEN REJECTION   COMPREHENSIVE METABOLIC PANEL   TROPONIN            ED Course, and Reassessment: Patient was given duo nebulizer treatment in the emergency department and she was given albuterol treatment in the emergency department by EMS.  Patient was also given Solu-Medrol.  Patient was originally on 5 L of oxygen we currently have her wean down to her baseline of 3 L.  The patient's current oxygen saturation is 96%.  During emergency department stay the patient was complaining of some sharp pleuritic like pain primarily on the right side with radiation up into her back.  EKG is nondiagnostic.  I am currently waiting on baseline cardiac labs however, considering the patient's cardiovascular status as well as her pulmonary status I have ordered a CT PE protocol.  This patient will be checked out to a.m. physician for final disposition        History from : Patient    Limitations to history : None    Patient was given the following medications:  Medications   methylPREDNISolone sodium succ (SOLU-MEDROL) 60 mg in sterile water 0.96 mL injection (60 mg IntraVENous Given 6/5/25 1442)   ipratropium 0.5 mg-albuterol 2.5 mg (DUONEB) nebulizer solution 1 Dose (1 Dose Inhalation Given 6/5/25 6001)       Medications:   New Prescriptions    No medications on file       Independent Imaging Interpretation by Radiology  Chest x-ray is negative    EKG (if obtained): (All EKG's are interpreted by myself in the absence of a cardiologist)  ST

## 2025-06-05 NOTE — H&P
MG tablet Take 1 tablet by mouth 3 times daily as needed for Itching    Indu Hudson MD   cyclobenzaprine (FLEXERIL) 5 MG tablet Take 1 tablet by mouth 3 times daily as needed for Muscle spasms    Indu Hudson MD   budesonide-formoterol (SYMBICORT) 160-4.5 MCG/ACT AERO Inhale 2 puffs into the lungs in the morning and 2 puffs in the evening. 7/12/24   Mary Kate Infante PA-C   busPIRone (BUSPAR) 10 MG tablet Take 0.5 tablets by mouth 2 times daily 2/15/24   Indu Hudson MD   amLODIPine (NORVASC) 5 MG tablet Take 1 tablet by mouth daily 3/20/24   Debra Castañeda APRN - CNP   ranolazine (RANEXA) 500 MG extended release tablet Take 1 tablet by mouth 2 times daily 3/20/24   Debra Castañeda APRN - CNP   metoprolol tartrate (LOPRESSOR) 25 MG tablet Take 1 tablet by mouth 2 times daily 2/2/24   Doc Rubio MD   nicotine (NICODERM CQ) 7 MG/24HR Place 1 patch onto the skin daily for 14 days 2/2/24 2/16/24  Doc Rubio MD   clopidogrel (PLAVIX) 75 MG tablet Take 1 tablet by mouth daily 1/25/24   Grisel Neely MD   albuterol (PROVENTIL) (2.5 MG/3ML) 0.083% nebulizer solution Take 3 mLs by nebulization every 6 hours as needed for Wheezing 1/25/24   Grisel Neely MD   omeprazole (PRILOSEC) 40 MG delayed release capsule Take 1 capsule by mouth daily    Indu Hudson MD   rosuvastatin (CRESTOR) 20 MG tablet Take 1 tablet by mouth daily    Indu Hudson MD   albuterol sulfate HFA (VENTOLIN HFA) 108 (90 BASE) MCG/ACT inhaler Inhale 2 puffs into the lungs every 6 hours as needed for Wheezing 3/16/17   Ebony Tijerina APRN - CNP   gabapentin (NEURONTIN) 600 MG tablet Take 1 tablet by mouth daily 2/22/17   Tijerina, Ebony M, APRN - CNP       Physical Exam:        General: NAD  Eyes: EOMI  ENT: neck supple  Cardiovascular: Regular rate and rhythm  Respiratory: Rhonchi with some scattered wheezing on oxygen 3 L via nasal cannula which is patient's baseline

## 2025-06-05 NOTE — ED NOTES
Mila NP here to evaluate pt  
RN covered patient with two warm blankets.   
RT at bedside administering breathing treatment.   
no  If Yes, please provide details: [] 1 Unit, [] 2 Units, [] Completed, [] Infusing. Addition information:     Recommendation    Incomplete orders:   Additional Comments:    If any further questions, please call Sending RN at ext:     Electronically signed by: Electronically signed by Lakeisha Person RN on 6/5/2025 at 10:55 AM

## 2025-06-05 NOTE — PROGRESS NOTES
Patient was referred to the CP program by inpatient CM. Patient lives in upstairs apartment with family. Would like to move to senior housing. Also may need referral to [employer]. Patient is still on the inpatient unit. Will visit with patient tomorrow.

## 2025-06-05 NOTE — CONSULTS
muscle and fat depletion on physical exam. Pt does meet criteria for severe malnutrition at this time. Will monitor and follow at moderate nutrition risk to ensure adequate oral intake and acceptance of oral supplements.    Nutrition Related Findings:    Mg 1.7 L, K+ 3.1 L, GLuc 161 H. Meds include Prednisone, KCl. Wound Type: None       Current Nutrition Intake & Therapies:    Average Meal Intake: 51-75%  Average Supplements Intake: None Ordered  ADULT DIET; Regular    Anthropometric Measures:  Height: 144.8 cm (4' 9\")  Ideal Body Weight (IBW): 85 lbs (39 kg)    Admission Body Weight: 30.8 kg (68 lb)  Current Body Weight: 30.8 kg (68 lb), 80 % IBW. Weight Source: Bed scale  Current BMI (kg/m2): 14.7  Usual Body Weight: 32.7 kg (72 lb) (7/2024)     % Weight Change (Calculated): -5.6  Weight Adjustment For: No Adjustment   BMI Categories: Underweight (BMI less than 22) age over 65    Estimated Daily Nutrient Needs:  Energy Requirements Based On: Kcal/kg  Weight Used for Energy Requirements: Ideal  Energy (kcal/day): 3624-8093 (40-45 kcals/kg)  Weight Used for Protein Requirements: Ideal  Protein (g/day): 46-55 (1.2-1.4 g/kg)  Method Used for Fluid Requirements: 1 ml/kcal  Fluid (ml/day): 1800 mL    Nutrition Diagnosis:   Severe malnutrition, in context of chronic illness related to increase demand for energy/nutrients, impaired respiratory function as evidenced by weight loss, loss of subcutaneous fat, muscle loss, criteria as identified in malnutrition assessment    Nutrition Interventions:   Food and/or Nutrient Delivery: Continue Current Diet, Start Oral Nutrition Supplement  Nutrition Education/Counseling: No recommendation at this time (Encouraged selection of alternatives at meals, use of oral supplement between meals, consuming smaller, more frequent meals.)  Coordination of Nutrition Care: Continue to monitor while inpatient  Plan of Care discussed with: pt    Goals:  Goals: Meet at least 75% of estimated

## 2025-06-05 NOTE — CARE COORDINATION
06/05/25 1200   Service Assessment   Patient Orientation Alert and Oriented   Cognition Alert   History Provided By Patient   Primary Caregiver Self   Support Systems Children;Family Members   Patient's Healthcare Decision Maker is: Patient Declined (Legal Next of Kin Remains as Decision Maker)   PCP Verified by CM Yes   Prior Functional Level Independent in ADLs/IADLs   Current Functional Level Independent in ADLs/IADLs   Can patient return to prior living arrangement Yes   Ability to make needs known: Good   Family able to assist with home care needs: Yes   Would you like for me to discuss the discharge plan with any other family members/significant others, and if so, who? No   Financial Resources Medicare;Medicaid   Community Resources None   Social/Functional History   Lives With Son;Other (Comment)  (Lives with her son and son's significant other)   Type of Home Apartment  (3rd floor apartment)   Home Access Stairs to enter with rails   Entrance Stairs - Number of Steps 37   Home Equipment Oxygen   Receives Help From Family   Active  No   Patient's  Info Son or son's significant other   Discharge Planning   Current Services Prior To Admission Oxygen Therapy   Patient expects to be discharged to: Apartment   Services At/After Discharge   Services At/After Discharge None   Mode of Transport at Discharge Other (see comment)  (Son)   Confirm Follow Up Transport Family   Condition of Participation: Discharge Planning   The Plan for Transition of Care is related to the following treatment goals: Plans to return home   The Patient and/or Patient Representative was provided with a Choice of Provider? Patient   The Patient and/Or Patient Representative agree with the Discharge Plan? Yes   Freedom of Choice list was provided with basic dialogue that supports the patient's individualized plan of care/goals, treatment preferences, and shares the quality data associated with the providers?  Yes     SYED met

## 2025-06-06 LAB
ANION GAP SERPL CALCULATED.3IONS-SCNC: 11 MMOL/L (ref 9–17)
BUN SERPL-MCNC: 12 MG/DL (ref 7–20)
CALCIUM SERPL-MCNC: 8.6 MG/DL (ref 8.3–10.6)
CHLORIDE SERPL-SCNC: 100 MMOL/L (ref 99–110)
CO2 SERPL-SCNC: 29 MMOL/L (ref 21–32)
CREAT SERPL-MCNC: 1.1 MG/DL (ref 0.6–1.2)
ERYTHROCYTE [DISTWIDTH] IN BLOOD BY AUTOMATED COUNT: 12.7 % (ref 11.7–14.9)
GFR, ESTIMATED: 57 ML/MIN/1.73M2
GLUCOSE SERPL-MCNC: 163 MG/DL (ref 74–99)
HCT VFR BLD AUTO: 31.1 % (ref 37–47)
HGB BLD-MCNC: 10 G/DL (ref 12.5–16)
MAGNESIUM SERPL-MCNC: 1.9 MG/DL (ref 1.8–2.4)
MCH RBC QN AUTO: 30.5 PG (ref 27–31)
MCHC RBC AUTO-ENTMCNC: 32.2 G/DL (ref 32–36)
MCV RBC AUTO: 94.8 FL (ref 78–100)
PLATELET # BLD AUTO: 193 K/UL (ref 140–440)
PMV BLD AUTO: 11 FL (ref 7.5–11.1)
POTASSIUM SERPL-SCNC: 3.1 MMOL/L (ref 3.5–5.1)
RBC # BLD AUTO: 3.28 M/UL (ref 4.2–5.4)
SODIUM SERPL-SCNC: 140 MMOL/L (ref 136–145)
WBC OTHER # BLD: 7.4 K/UL (ref 4–10.5)

## 2025-06-06 PROCEDURE — 2700000000 HC OXYGEN THERAPY PER DAY

## 2025-06-06 PROCEDURE — 94761 N-INVAS EAR/PLS OXIMETRY MLT: CPT

## 2025-06-06 PROCEDURE — 36415 COLL VENOUS BLD VENIPUNCTURE: CPT

## 2025-06-06 PROCEDURE — 94640 AIRWAY INHALATION TREATMENT: CPT

## 2025-06-06 PROCEDURE — 6370000000 HC RX 637 (ALT 250 FOR IP): Performed by: NURSE PRACTITIONER

## 2025-06-06 PROCEDURE — 83735 ASSAY OF MAGNESIUM: CPT

## 2025-06-06 PROCEDURE — 6360000002 HC RX W HCPCS: Performed by: NURSE PRACTITIONER

## 2025-06-06 PROCEDURE — 1200000000 HC SEMI PRIVATE

## 2025-06-06 PROCEDURE — 80048 BASIC METABOLIC PNL TOTAL CA: CPT

## 2025-06-06 PROCEDURE — 2500000003 HC RX 250 WO HCPCS: Performed by: NURSE PRACTITIONER

## 2025-06-06 PROCEDURE — 85027 COMPLETE CBC AUTOMATED: CPT

## 2025-06-06 RX ORDER — NICOTINE 21 MG/24HR
1 PATCH, TRANSDERMAL 24 HOURS TRANSDERMAL DAILY
Status: DISCONTINUED | OUTPATIENT
Start: 2025-06-06 | End: 2025-06-10 | Stop reason: HOSPADM

## 2025-06-06 RX ORDER — POTASSIUM CHLORIDE 750 MG/1
40 TABLET, EXTENDED RELEASE ORAL ONCE
Status: COMPLETED | OUTPATIENT
Start: 2025-06-06 | End: 2025-06-06

## 2025-06-06 RX ORDER — HYDROXYZINE HYDROCHLORIDE 25 MG/1
25 TABLET, FILM COATED ORAL 3 TIMES DAILY PRN
Status: DISCONTINUED | OUTPATIENT
Start: 2025-06-06 | End: 2025-06-07

## 2025-06-06 RX ORDER — IPRATROPIUM BROMIDE AND ALBUTEROL SULFATE 2.5; .5 MG/3ML; MG/3ML
1 SOLUTION RESPIRATORY (INHALATION)
Status: DISCONTINUED | OUTPATIENT
Start: 2025-06-06 | End: 2025-06-10 | Stop reason: HOSPADM

## 2025-06-06 RX ORDER — HEPARIN SODIUM 5000 [USP'U]/ML
5000 INJECTION, SOLUTION INTRAVENOUS; SUBCUTANEOUS 2 TIMES DAILY
Status: DISCONTINUED | OUTPATIENT
Start: 2025-06-06 | End: 2025-06-10 | Stop reason: HOSPADM

## 2025-06-06 RX ADMIN — ACETAMINOPHEN 650 MG: 325 TABLET ORAL at 12:51

## 2025-06-06 RX ADMIN — IPRATROPIUM BROMIDE AND ALBUTEROL SULFATE 1 DOSE: .5; 2.5 SOLUTION RESPIRATORY (INHALATION) at 11:00

## 2025-06-06 RX ADMIN — ROSUVASTATIN CALCIUM 20 MG: 20 TABLET, FILM COATED ORAL at 21:53

## 2025-06-06 RX ADMIN — PREDNISONE 40 MG: 20 TABLET ORAL at 17:20

## 2025-06-06 RX ADMIN — DOXYCYCLINE HYCLATE 100 MG: 100 TABLET, COATED ORAL at 21:52

## 2025-06-06 RX ADMIN — PANTOPRAZOLE SODIUM 40 MG: 40 TABLET, DELAYED RELEASE ORAL at 04:25

## 2025-06-06 RX ADMIN — ROPINIROLE 1 MG: 1 TABLET, FILM COATED ORAL at 21:53

## 2025-06-06 RX ADMIN — CLOPIDOGREL BISULFATE 75 MG: 75 TABLET ORAL at 08:06

## 2025-06-06 RX ADMIN — METOPROLOL TARTRATE 25 MG: 25 TABLET, FILM COATED ORAL at 21:52

## 2025-06-06 RX ADMIN — ACETAMINOPHEN 650 MG: 325 TABLET ORAL at 22:16

## 2025-06-06 RX ADMIN — ACETAMINOPHEN 650 MG: 325 TABLET ORAL at 05:01

## 2025-06-06 RX ADMIN — HEPARIN SODIUM 5000 UNITS: 5000 INJECTION INTRAVENOUS; SUBCUTANEOUS at 21:52

## 2025-06-06 RX ADMIN — SODIUM CHLORIDE, PRESERVATIVE FREE 10 ML: 5 INJECTION INTRAVENOUS at 08:07

## 2025-06-06 RX ADMIN — IPRATROPIUM BROMIDE AND ALBUTEROL SULFATE 1 DOSE: .5; 2.5 SOLUTION RESPIRATORY (INHALATION) at 17:20

## 2025-06-06 RX ADMIN — HYDROXYZINE HYDROCHLORIDE 25 MG: 25 TABLET, FILM COATED ORAL at 17:42

## 2025-06-06 RX ADMIN — GABAPENTIN 600 MG: 600 TABLET, FILM COATED ORAL at 08:06

## 2025-06-06 RX ADMIN — POTASSIUM CHLORIDE 40 MEQ: 750 TABLET, EXTENDED RELEASE ORAL at 12:52

## 2025-06-06 RX ADMIN — IPRATROPIUM BROMIDE AND ALBUTEROL SULFATE 1 DOSE: .5; 2.5 SOLUTION RESPIRATORY (INHALATION) at 07:10

## 2025-06-06 RX ADMIN — POTASSIUM CHLORIDE 40 MEQ: 750 TABLET, EXTENDED RELEASE ORAL at 08:06

## 2025-06-06 RX ADMIN — IPRATROPIUM BROMIDE AND ALBUTEROL SULFATE 1 DOSE: .5; 2.5 SOLUTION RESPIRATORY (INHALATION) at 04:38

## 2025-06-06 RX ADMIN — HYDROXYZINE HYDROCHLORIDE 25 MG: 25 TABLET, FILM COATED ORAL at 08:06

## 2025-06-06 RX ADMIN — HEPARIN SODIUM 5000 UNITS: 5000 INJECTION INTRAVENOUS; SUBCUTANEOUS at 08:06

## 2025-06-06 RX ADMIN — DOXYCYCLINE HYCLATE 100 MG: 100 TABLET, COATED ORAL at 08:06

## 2025-06-06 RX ADMIN — IPRATROPIUM BROMIDE AND ALBUTEROL SULFATE 1 DOSE: .5; 2.5 SOLUTION RESPIRATORY (INHALATION) at 20:42

## 2025-06-06 RX ADMIN — SODIUM CHLORIDE, PRESERVATIVE FREE 10 ML: 5 INJECTION INTRAVENOUS at 21:53

## 2025-06-06 ASSESSMENT — PAIN DESCRIPTION - DESCRIPTORS
DESCRIPTORS: ACHING

## 2025-06-06 ASSESSMENT — PAIN DESCRIPTION - LOCATION
LOCATION: HEAD
LOCATION: HEAD
LOCATION: RIB CAGE

## 2025-06-06 ASSESSMENT — PAIN SCALES - GENERAL
PAINLEVEL_OUTOF10: 0
PAINLEVEL_OUTOF10: 7
PAINLEVEL_OUTOF10: 7
PAINLEVEL_OUTOF10: 2
PAINLEVEL_OUTOF10: 7
PAINLEVEL_OUTOF10: 4
PAINLEVEL_OUTOF10: 2

## 2025-06-06 ASSESSMENT — PAIN DESCRIPTION - ORIENTATION
ORIENTATION: RIGHT;LEFT
ORIENTATION: ANTERIOR
ORIENTATION: MID

## 2025-06-06 ASSESSMENT — PAIN - FUNCTIONAL ASSESSMENT
PAIN_FUNCTIONAL_ASSESSMENT: PREVENTS OR INTERFERES SOME ACTIVE ACTIVITIES AND ADLS
PAIN_FUNCTIONAL_ASSESSMENT: ACTIVITIES ARE NOT PREVENTED
PAIN_FUNCTIONAL_ASSESSMENT: ACTIVITIES ARE NOT PREVENTED

## 2025-06-06 ASSESSMENT — PAIN DESCRIPTION - FREQUENCY
FREQUENCY: INTERMITTENT

## 2025-06-06 ASSESSMENT — PAIN DESCRIPTION - PAIN TYPE
TYPE: ACUTE PAIN

## 2025-06-06 ASSESSMENT — PAIN DESCRIPTION - ONSET
ONSET: GRADUAL
ONSET: SUDDEN
ONSET: GRADUAL

## 2025-06-06 NOTE — PLAN OF CARE
Problem: Skin/Tissue Integrity  Goal: Skin integrity remains intact  Description: 1.  Monitor for areas of redness and/or skin breakdown2.  Assess vascular access sites hourly3.  Every 4-6 hours minimum:  Change oxygen saturation probe site4.  Every 4-6 hours:  If on nasal continuous positive airway pressure, respiratory therapy assess nares and determine need for appliance change or resting period  6/5/2025 2124 by Estelita Cortes, RN  Outcome: Progressing  6/5/2025 1346 by Kemi Fowler RN  Outcome: Progressing     Problem: Safety - Adult  Goal: Free from fall injury  6/5/2025 2124 by Estelita Cortes, RN  Outcome: Progressing  6/5/2025 1346 by Kemi Fowler RN  Outcome: Progressing     Problem: Nutrition Deficit:  Goal: Optimize nutritional status  Outcome: Progressing     Problem: Pain  Goal: Verbalizes/displays adequate comfort level or baseline comfort level  Outcome: Progressing

## 2025-06-06 NOTE — PLAN OF CARE
Problem: Skin/Tissue Integrity  Goal: Skin integrity remains intact  Description: 1.  Monitor for areas of redness and/or skin breakdown2.  Assess vascular access sites hourly3.  Every 4-6 hours minimum:  Change oxygen saturation probe site4.  Every 4-6 hours:  If on nasal continuous positive airway pressure, respiratory therapy assess nares and determine need for appliance change or resting period  6/6/2025 0825 by Agnus Luo RN  Outcome: Progressing  6/5/2025 2124 by Estelita Cortes RN  Outcome: Progressing     Problem: Safety - Adult  Goal: Free from fall injury  6/6/2025 0825 by Angus Luo RN  Outcome: Progressing  6/5/2025 2124 by Estelita Cortes RN  Outcome: Progressing     Problem: Nutrition Deficit:  Goal: Optimize nutritional status  6/6/2025 0825 by Angus Luo RN  Outcome: Progressing  6/5/2025 2124 by Estelita Cortes RN  Outcome: Progressing     Problem: Pain  Goal: Verbalizes/displays adequate comfort level or baseline comfort level  6/6/2025 0825 by Angus Luo RN  Outcome: Progressing  6/5/2025 2124 by Estelita Cortes RN  Outcome: Progressing

## 2025-06-07 ENCOUNTER — APPOINTMENT (OUTPATIENT)
Dept: GENERAL RADIOLOGY | Age: 65
DRG: 190 | End: 2025-06-07
Payer: MEDICARE

## 2025-06-07 LAB
ANION GAP SERPL CALCULATED.3IONS-SCNC: 8 MMOL/L (ref 9–17)
BUN SERPL-MCNC: 14 MG/DL (ref 7–20)
CALCIUM SERPL-MCNC: 8.5 MG/DL (ref 8.3–10.6)
CHLORIDE SERPL-SCNC: 104 MMOL/L (ref 99–110)
CO2 SERPL-SCNC: 30 MMOL/L (ref 21–32)
CREAT SERPL-MCNC: 1 MG/DL (ref 0.6–1.2)
ERYTHROCYTE [DISTWIDTH] IN BLOOD BY AUTOMATED COUNT: 13.1 % (ref 11.7–14.9)
GFR, ESTIMATED: 61 ML/MIN/1.73M2
GLUCOSE SERPL-MCNC: 121 MG/DL (ref 74–99)
HCO3 VENOUS: 32.5 MMOL/L (ref 22–29)
HCT VFR BLD AUTO: 32.6 % (ref 37–47)
HGB BLD-MCNC: 10.2 G/DL (ref 12.5–16)
MAGNESIUM SERPL-MCNC: 2 MG/DL (ref 1.8–2.4)
MCH RBC QN AUTO: 30.3 PG (ref 27–31)
MCHC RBC AUTO-ENTMCNC: 31.3 G/DL (ref 32–36)
MCV RBC AUTO: 96.7 FL (ref 78–100)
O2 SAT, VEN: 68.3 % (ref 34–95)
PCO2 VENOUS: 82.9 MM HG (ref 41–51)
PH VENOUS: 7.2 (ref 7.32–7.43)
PLATELET # BLD AUTO: 213 K/UL (ref 140–440)
PMV BLD AUTO: 11 FL (ref 7.5–11.1)
PO2 VENOUS: 45.3 MM HG (ref 28–48)
POSITIVE BASE EXCESS, VEN: 1.8 MMOL/L (ref 0–3)
POTASSIUM SERPL-SCNC: 4.1 MMOL/L (ref 3.5–5.1)
RBC # BLD AUTO: 3.37 M/UL (ref 4.2–5.4)
SODIUM SERPL-SCNC: 141 MMOL/L (ref 136–145)
TCO2 CALC VENOUS: 35 MMOL/L (ref 21–32)
WBC OTHER # BLD: 9.5 K/UL (ref 4–10.5)

## 2025-06-07 PROCEDURE — 1200000000 HC SEMI PRIVATE

## 2025-06-07 PROCEDURE — 94640 AIRWAY INHALATION TREATMENT: CPT

## 2025-06-07 PROCEDURE — 6370000000 HC RX 637 (ALT 250 FOR IP): Performed by: NURSE PRACTITIONER

## 2025-06-07 PROCEDURE — 2700000000 HC OXYGEN THERAPY PER DAY

## 2025-06-07 PROCEDURE — 80048 BASIC METABOLIC PNL TOTAL CA: CPT

## 2025-06-07 PROCEDURE — 6370000000 HC RX 637 (ALT 250 FOR IP): Performed by: PHYSICIAN ASSISTANT

## 2025-06-07 PROCEDURE — 6360000002 HC RX W HCPCS: Performed by: NURSE PRACTITIONER

## 2025-06-07 PROCEDURE — 93005 ELECTROCARDIOGRAM TRACING: CPT | Performed by: NURSE PRACTITIONER

## 2025-06-07 PROCEDURE — 2500000003 HC RX 250 WO HCPCS: Performed by: NURSE PRACTITIONER

## 2025-06-07 PROCEDURE — 71045 X-RAY EXAM CHEST 1 VIEW: CPT

## 2025-06-07 PROCEDURE — 36415 COLL VENOUS BLD VENIPUNCTURE: CPT

## 2025-06-07 PROCEDURE — 82803 BLOOD GASES ANY COMBINATION: CPT

## 2025-06-07 PROCEDURE — 83735 ASSAY OF MAGNESIUM: CPT

## 2025-06-07 PROCEDURE — 94761 N-INVAS EAR/PLS OXIMETRY MLT: CPT

## 2025-06-07 PROCEDURE — 85027 COMPLETE CBC AUTOMATED: CPT

## 2025-06-07 RX ORDER — HYDROXYZINE HYDROCHLORIDE 25 MG/1
25 TABLET, FILM COATED ORAL EVERY 8 HOURS PRN
Status: DISCONTINUED | OUTPATIENT
Start: 2025-06-07 | End: 2025-06-08

## 2025-06-07 RX ORDER — ACETYLCYSTEINE 100 MG/ML
4 SOLUTION ORAL; RESPIRATORY (INHALATION)
Status: DISCONTINUED | OUTPATIENT
Start: 2025-06-07 | End: 2025-06-10 | Stop reason: HOSPADM

## 2025-06-07 RX ORDER — ONDANSETRON 2 MG/ML
4 INJECTION INTRAMUSCULAR; INTRAVENOUS EVERY 6 HOURS PRN
Status: DISCONTINUED | OUTPATIENT
Start: 2025-06-07 | End: 2025-06-10 | Stop reason: HOSPADM

## 2025-06-07 RX ORDER — DIAZEPAM 2 MG/1
2 TABLET ORAL ONCE
Status: COMPLETED | OUTPATIENT
Start: 2025-06-07 | End: 2025-06-07

## 2025-06-07 RX ADMIN — PANTOPRAZOLE SODIUM 40 MG: 40 TABLET, DELAYED RELEASE ORAL at 04:54

## 2025-06-07 RX ADMIN — HYDROXYZINE HYDROCHLORIDE 25 MG: 25 TABLET, FILM COATED ORAL at 08:23

## 2025-06-07 RX ADMIN — IPRATROPIUM BROMIDE AND ALBUTEROL SULFATE 1 DOSE: .5; 2.5 SOLUTION RESPIRATORY (INHALATION) at 13:44

## 2025-06-07 RX ADMIN — METOPROLOL TARTRATE 25 MG: 25 TABLET, FILM COATED ORAL at 08:55

## 2025-06-07 RX ADMIN — DOXYCYCLINE HYCLATE 100 MG: 100 TABLET, COATED ORAL at 21:07

## 2025-06-07 RX ADMIN — IPRATROPIUM BROMIDE AND ALBUTEROL SULFATE 1 DOSE: .5; 2.5 SOLUTION RESPIRATORY (INHALATION) at 07:06

## 2025-06-07 RX ADMIN — HYDROXYZINE HYDROCHLORIDE 25 MG: 25 TABLET, FILM COATED ORAL at 03:05

## 2025-06-07 RX ADMIN — SODIUM CHLORIDE, PRESERVATIVE FREE 5 ML: 5 INJECTION INTRAVENOUS at 21:14

## 2025-06-07 RX ADMIN — CLOPIDOGREL BISULFATE 75 MG: 75 TABLET ORAL at 08:53

## 2025-06-07 RX ADMIN — ONDANSETRON 4 MG: 2 INJECTION, SOLUTION INTRAMUSCULAR; INTRAVENOUS at 16:47

## 2025-06-07 RX ADMIN — ACETAMINOPHEN 650 MG: 325 TABLET ORAL at 18:25

## 2025-06-07 RX ADMIN — SODIUM CHLORIDE, PRESERVATIVE FREE 10 ML: 5 INJECTION INTRAVENOUS at 08:59

## 2025-06-07 RX ADMIN — PREDNISONE 40 MG: 20 TABLET ORAL at 18:25

## 2025-06-07 RX ADMIN — HEPARIN SODIUM 5000 UNITS: 5000 INJECTION INTRAVENOUS; SUBCUTANEOUS at 08:56

## 2025-06-07 RX ADMIN — IPRATROPIUM BROMIDE AND ALBUTEROL SULFATE 1 DOSE: .5; 2.5 SOLUTION RESPIRATORY (INHALATION) at 10:26

## 2025-06-07 RX ADMIN — METOPROLOL TARTRATE 25 MG: 25 TABLET, FILM COATED ORAL at 21:09

## 2025-06-07 RX ADMIN — DOXYCYCLINE HYCLATE 100 MG: 100 TABLET, COATED ORAL at 08:53

## 2025-06-07 RX ADMIN — IPRATROPIUM BROMIDE AND ALBUTEROL SULFATE 1 DOSE: .5; 2.5 SOLUTION RESPIRATORY (INHALATION) at 22:49

## 2025-06-07 RX ADMIN — IPRATROPIUM BROMIDE AND ALBUTEROL SULFATE 1 DOSE: .5; 2.5 SOLUTION RESPIRATORY (INHALATION) at 19:38

## 2025-06-07 RX ADMIN — ACETYLCYSTEINE 400 MG: 100 SOLUTION ORAL; RESPIRATORY (INHALATION) at 17:54

## 2025-06-07 RX ADMIN — ROSUVASTATIN CALCIUM 20 MG: 20 TABLET, FILM COATED ORAL at 21:06

## 2025-06-07 RX ADMIN — IPRATROPIUM BROMIDE AND ALBUTEROL SULFATE 1 DOSE: .5; 2.5 SOLUTION RESPIRATORY (INHALATION) at 03:08

## 2025-06-07 RX ADMIN — IPRATROPIUM BROMIDE AND ALBUTEROL SULFATE 1 DOSE: .5; 2.5 SOLUTION RESPIRATORY (INHALATION) at 15:43

## 2025-06-07 RX ADMIN — DIAZEPAM 2 MG: 2 TABLET ORAL at 21:07

## 2025-06-07 RX ADMIN — HYDROXYZINE HYDROCHLORIDE 25 MG: 25 TABLET, FILM COATED ORAL at 13:56

## 2025-06-07 RX ADMIN — GABAPENTIN 600 MG: 600 TABLET, FILM COATED ORAL at 23:05

## 2025-06-07 RX ADMIN — IPRATROPIUM BROMIDE AND ALBUTEROL SULFATE 1 DOSE: .5; 2.5 SOLUTION RESPIRATORY (INHALATION) at 17:53

## 2025-06-07 RX ADMIN — IPRATROPIUM BROMIDE AND ALBUTEROL SULFATE 1 DOSE: .5; 2.5 SOLUTION RESPIRATORY (INHALATION) at 20:23

## 2025-06-07 RX ADMIN — ROPINIROLE 1 MG: 1 TABLET, FILM COATED ORAL at 21:06

## 2025-06-07 ASSESSMENT — PAIN DESCRIPTION - DESCRIPTORS: DESCRIPTORS: ACHING;DISCOMFORT

## 2025-06-07 ASSESSMENT — PAIN DESCRIPTION - LOCATION: LOCATION: BACK

## 2025-06-07 ASSESSMENT — PAIN SCALES - GENERAL
PAINLEVEL_OUTOF10: 0
PAINLEVEL_OUTOF10: 7

## 2025-06-07 ASSESSMENT — PAIN DESCRIPTION - ORIENTATION: ORIENTATION: RIGHT;LEFT;MID;LOWER

## 2025-06-07 NOTE — PLAN OF CARE
Problem: Skin/Tissue Integrity  Goal: Skin integrity remains intact  Description: 1.  Monitor for areas of redness and/or skin breakdown2.  Assess vascular access sites hourly3.  Every 4-6 hours minimum:  Change oxygen saturation probe site4.  Every 4-6 hours:  If on nasal continuous positive airway pressure, respiratory therapy assess nares and determine need for appliance change or resting period  Outcome: Progressing     Problem: Safety - Adult  Goal: Free from fall injury  Outcome: Progressing     Problem: Nutrition Deficit:  Goal: Optimize nutritional status  Outcome: Progressing     Problem: Pain  Goal: Verbalizes/displays adequate comfort level or baseline comfort level  Outcome: Progressing

## 2025-06-08 LAB
ANION GAP SERPL CALCULATED.3IONS-SCNC: 12 MMOL/L (ref 9–17)
BUN SERPL-MCNC: 25 MG/DL (ref 7–20)
CALCIUM SERPL-MCNC: 8.9 MG/DL (ref 8.3–10.6)
CHLORIDE SERPL-SCNC: 98 MMOL/L (ref 99–110)
CO2 SERPL-SCNC: 31 MMOL/L (ref 21–32)
CREAT SERPL-MCNC: 1.4 MG/DL (ref 0.6–1.2)
EKG ATRIAL RATE: 72 BPM
EKG ATRIAL RATE: 99 BPM
EKG DIAGNOSIS: NORMAL
EKG DIAGNOSIS: NORMAL
EKG P AXIS: 87 DEGREES
EKG P AXIS: 89 DEGREES
EKG P-R INTERVAL: 100 MS
EKG P-R INTERVAL: 102 MS
EKG Q-T INTERVAL: 338 MS
EKG Q-T INTERVAL: 456 MS
EKG QRS DURATION: 68 MS
EKG QRS DURATION: 68 MS
EKG QTC CALCULATION (BAZETT): 433 MS
EKG QTC CALCULATION (BAZETT): 499 MS
EKG R AXIS: 67 DEGREES
EKG R AXIS: 73 DEGREES
EKG T AXIS: 267 DEGREES
EKG T AXIS: 94 DEGREES
EKG VENTRICULAR RATE: 72 BPM
EKG VENTRICULAR RATE: 99 BPM
ERYTHROCYTE [DISTWIDTH] IN BLOOD BY AUTOMATED COUNT: 13.2 % (ref 11.7–14.9)
GFR, ESTIMATED: 44 ML/MIN/1.73M2
GLUCOSE SERPL-MCNC: 100 MG/DL (ref 74–99)
HCT VFR BLD AUTO: 38.2 % (ref 37–47)
HGB BLD-MCNC: 11.9 G/DL (ref 12.5–16)
MAGNESIUM SERPL-MCNC: 2 MG/DL (ref 1.8–2.4)
MCH RBC QN AUTO: 30.6 PG (ref 27–31)
MCHC RBC AUTO-ENTMCNC: 31.2 G/DL (ref 32–36)
MCV RBC AUTO: 98.2 FL (ref 78–100)
PLATELET # BLD AUTO: 226 K/UL (ref 140–440)
PMV BLD AUTO: 10.9 FL (ref 7.5–11.1)
POTASSIUM SERPL-SCNC: 4.7 MMOL/L (ref 3.5–5.1)
RBC # BLD AUTO: 3.89 M/UL (ref 4.2–5.4)
SODIUM SERPL-SCNC: 140 MMOL/L (ref 136–145)
WBC OTHER # BLD: 9.5 K/UL (ref 4–10.5)

## 2025-06-08 PROCEDURE — 6360000002 HC RX W HCPCS: Performed by: NURSE PRACTITIONER

## 2025-06-08 PROCEDURE — 36415 COLL VENOUS BLD VENIPUNCTURE: CPT

## 2025-06-08 PROCEDURE — 2580000003 HC RX 258: Performed by: NURSE PRACTITIONER

## 2025-06-08 PROCEDURE — 83735 ASSAY OF MAGNESIUM: CPT

## 2025-06-08 PROCEDURE — 93010 ELECTROCARDIOGRAM REPORT: CPT | Performed by: INTERNAL MEDICINE

## 2025-06-08 PROCEDURE — 1200000000 HC SEMI PRIVATE

## 2025-06-08 PROCEDURE — 93005 ELECTROCARDIOGRAM TRACING: CPT | Performed by: NURSE PRACTITIONER

## 2025-06-08 PROCEDURE — 80048 BASIC METABOLIC PNL TOTAL CA: CPT

## 2025-06-08 PROCEDURE — 2700000000 HC OXYGEN THERAPY PER DAY

## 2025-06-08 PROCEDURE — 2500000003 HC RX 250 WO HCPCS: Performed by: NURSE PRACTITIONER

## 2025-06-08 PROCEDURE — 6370000000 HC RX 637 (ALT 250 FOR IP): Performed by: NURSE PRACTITIONER

## 2025-06-08 PROCEDURE — 94640 AIRWAY INHALATION TREATMENT: CPT

## 2025-06-08 PROCEDURE — 85027 COMPLETE CBC AUTOMATED: CPT

## 2025-06-08 RX ORDER — DIAZEPAM 2 MG/1
2 TABLET ORAL EVERY 8 HOURS PRN
Status: DISCONTINUED | OUTPATIENT
Start: 2025-06-08 | End: 2025-06-10 | Stop reason: HOSPADM

## 2025-06-08 RX ORDER — SODIUM CHLORIDE, SODIUM LACTATE, POTASSIUM CHLORIDE, CALCIUM CHLORIDE 600; 310; 30; 20 MG/100ML; MG/100ML; MG/100ML; MG/100ML
INJECTION, SOLUTION INTRAVENOUS CONTINUOUS
Status: DISCONTINUED | OUTPATIENT
Start: 2025-06-08 | End: 2025-06-09

## 2025-06-08 RX ORDER — CALCIUM CARBONATE 500 MG/1
1000 TABLET, CHEWABLE ORAL 3 TIMES DAILY PRN
Status: DISCONTINUED | OUTPATIENT
Start: 2025-06-08 | End: 2025-06-10 | Stop reason: HOSPADM

## 2025-06-08 RX ADMIN — IPRATROPIUM BROMIDE AND ALBUTEROL SULFATE 1 DOSE: .5; 2.5 SOLUTION RESPIRATORY (INHALATION) at 14:31

## 2025-06-08 RX ADMIN — IPRATROPIUM BROMIDE AND ALBUTEROL SULFATE 1 DOSE: .5; 2.5 SOLUTION RESPIRATORY (INHALATION) at 17:24

## 2025-06-08 RX ADMIN — IPRATROPIUM BROMIDE AND ALBUTEROL SULFATE 1 DOSE: .5; 2.5 SOLUTION RESPIRATORY (INHALATION) at 21:22

## 2025-06-08 RX ADMIN — SODIUM CHLORIDE, PRESERVATIVE FREE 10 ML: 5 INJECTION INTRAVENOUS at 09:31

## 2025-06-08 RX ADMIN — DIAZEPAM 2 MG: 2 TABLET ORAL at 16:41

## 2025-06-08 RX ADMIN — IPRATROPIUM BROMIDE AND ALBUTEROL SULFATE 1 DOSE: .5; 2.5 SOLUTION RESPIRATORY (INHALATION) at 06:27

## 2025-06-08 RX ADMIN — HEPARIN SODIUM 5000 UNITS: 5000 INJECTION INTRAVENOUS; SUBCUTANEOUS at 09:27

## 2025-06-08 RX ADMIN — ACETYLCYSTEINE 400 MG: 100 SOLUTION ORAL; RESPIRATORY (INHALATION) at 14:31

## 2025-06-08 RX ADMIN — IPRATROPIUM BROMIDE AND ALBUTEROL SULFATE 1 DOSE: .5; 2.5 SOLUTION RESPIRATORY (INHALATION) at 23:12

## 2025-06-08 RX ADMIN — ACETYLCYSTEINE 400 MG: 100 SOLUTION ORAL; RESPIRATORY (INHALATION) at 09:55

## 2025-06-08 RX ADMIN — PANTOPRAZOLE SODIUM 40 MG: 40 TABLET, DELAYED RELEASE ORAL at 06:14

## 2025-06-08 RX ADMIN — SODIUM CHLORIDE, SODIUM LACTATE, POTASSIUM CHLORIDE, AND CALCIUM CHLORIDE: .6; .31; .03; .02 INJECTION, SOLUTION INTRAVENOUS at 09:41

## 2025-06-08 RX ADMIN — GABAPENTIN 600 MG: 600 TABLET, FILM COATED ORAL at 21:06

## 2025-06-08 RX ADMIN — METOPROLOL TARTRATE 25 MG: 25 TABLET, FILM COATED ORAL at 09:27

## 2025-06-08 RX ADMIN — ROSUVASTATIN CALCIUM 20 MG: 20 TABLET, FILM COATED ORAL at 21:05

## 2025-06-08 RX ADMIN — HEPARIN SODIUM 5000 UNITS: 5000 INJECTION INTRAVENOUS; SUBCUTANEOUS at 21:05

## 2025-06-08 RX ADMIN — PREDNISONE 40 MG: 20 TABLET ORAL at 16:40

## 2025-06-08 RX ADMIN — IPRATROPIUM BROMIDE AND ALBUTEROL SULFATE 1 DOSE: .5; 2.5 SOLUTION RESPIRATORY (INHALATION) at 00:41

## 2025-06-08 RX ADMIN — HYDROXYZINE HYDROCHLORIDE 25 MG: 25 TABLET, FILM COATED ORAL at 09:45

## 2025-06-08 RX ADMIN — METOPROLOL TARTRATE 25 MG: 25 TABLET, FILM COATED ORAL at 21:06

## 2025-06-08 RX ADMIN — CLOPIDOGREL BISULFATE 75 MG: 75 TABLET ORAL at 09:27

## 2025-06-08 RX ADMIN — IPRATROPIUM BROMIDE AND ALBUTEROL SULFATE 1 DOSE: .5; 2.5 SOLUTION RESPIRATORY (INHALATION) at 09:56

## 2025-06-08 RX ADMIN — ACETYLCYSTEINE 400 MG: 100 SOLUTION ORAL; RESPIRATORY (INHALATION) at 06:27

## 2025-06-08 RX ADMIN — IPRATROPIUM BROMIDE AND ALBUTEROL SULFATE 1 DOSE: .5; 2.5 SOLUTION RESPIRATORY (INHALATION) at 12:56

## 2025-06-08 RX ADMIN — DOXYCYCLINE HYCLATE 100 MG: 100 TABLET, COATED ORAL at 09:27

## 2025-06-08 RX ADMIN — IPRATROPIUM BROMIDE AND ALBUTEROL SULFATE 1 DOSE: .5; 2.5 SOLUTION RESPIRATORY (INHALATION) at 19:23

## 2025-06-08 RX ADMIN — ROPINIROLE 1 MG: 1 TABLET, FILM COATED ORAL at 21:05

## 2025-06-08 RX ADMIN — ANTACID TABLETS 1000 MG: 500 TABLET, CHEWABLE ORAL at 13:27

## 2025-06-08 ASSESSMENT — PAIN SCALES - GENERAL
PAINLEVEL_OUTOF10: 0
PAINLEVEL_OUTOF10: 0

## 2025-06-08 NOTE — PLAN OF CARE
Problem: Skin/Tissue Integrity  Goal: Skin integrity remains intact  Description: 1.  Monitor for areas of redness and/or skin breakdown2.  Assess vascular access sites hourly3.  Every 4-6 hours minimum:  Change oxygen saturation probe site4.  Every 4-6 hours:  If on nasal continuous positive airway pressure, respiratory therapy assess nares and determine need for appliance change or resting period  Outcome: Adequate for Discharge     Problem: Safety - Adult  Goal: Free from fall injury  Outcome: Adequate for Discharge     Problem: Nutrition Deficit:  Goal: Optimize nutritional status  Outcome: Not Progressing     Problem: Pain  Goal: Verbalizes/displays adequate comfort level or baseline comfort level  Outcome: Adequate for Discharge     Problem: Nutrition Deficit:  Goal: Optimize nutritional status  Outcome: Not Progressing

## 2025-06-09 LAB
ANION GAP SERPL CALCULATED.3IONS-SCNC: 11 MMOL/L (ref 9–17)
BUN SERPL-MCNC: 32 MG/DL (ref 7–20)
CALCIUM SERPL-MCNC: 8.6 MG/DL (ref 8.3–10.6)
CHLORIDE SERPL-SCNC: 98 MMOL/L (ref 99–110)
CO2 SERPL-SCNC: 31 MMOL/L (ref 21–32)
CREAT SERPL-MCNC: 1.3 MG/DL (ref 0.6–1.2)
ERYTHROCYTE [DISTWIDTH] IN BLOOD BY AUTOMATED COUNT: 13.1 % (ref 11.7–14.9)
GFR, ESTIMATED: 45 ML/MIN/1.73M2
GLUCOSE SERPL-MCNC: 96 MG/DL (ref 74–99)
HCT VFR BLD AUTO: 38.5 % (ref 37–47)
HGB BLD-MCNC: 12 G/DL (ref 12.5–16)
MAGNESIUM SERPL-MCNC: 2.1 MG/DL (ref 1.8–2.4)
MCH RBC QN AUTO: 30.2 PG (ref 27–31)
MCHC RBC AUTO-ENTMCNC: 31.2 G/DL (ref 32–36)
MCV RBC AUTO: 96.7 FL (ref 78–100)
PLATELET # BLD AUTO: 239 K/UL (ref 140–440)
PMV BLD AUTO: 10.8 FL (ref 7.5–11.1)
POTASSIUM SERPL-SCNC: 4 MMOL/L (ref 3.5–5.1)
RBC # BLD AUTO: 3.98 M/UL (ref 4.2–5.4)
SODIUM SERPL-SCNC: 140 MMOL/L (ref 136–145)
WBC OTHER # BLD: 8.5 K/UL (ref 4–10.5)

## 2025-06-09 PROCEDURE — 83735 ASSAY OF MAGNESIUM: CPT

## 2025-06-09 PROCEDURE — 6370000000 HC RX 637 (ALT 250 FOR IP): Performed by: NURSE PRACTITIONER

## 2025-06-09 PROCEDURE — 6360000002 HC RX W HCPCS: Performed by: NURSE PRACTITIONER

## 2025-06-09 PROCEDURE — 36415 COLL VENOUS BLD VENIPUNCTURE: CPT

## 2025-06-09 PROCEDURE — 2700000000 HC OXYGEN THERAPY PER DAY

## 2025-06-09 PROCEDURE — 85027 COMPLETE CBC AUTOMATED: CPT

## 2025-06-09 PROCEDURE — 97530 THERAPEUTIC ACTIVITIES: CPT

## 2025-06-09 PROCEDURE — 94761 N-INVAS EAR/PLS OXIMETRY MLT: CPT

## 2025-06-09 PROCEDURE — 93005 ELECTROCARDIOGRAM TRACING: CPT | Performed by: NURSE PRACTITIONER

## 2025-06-09 PROCEDURE — 97162 PT EVAL MOD COMPLEX 30 MIN: CPT

## 2025-06-09 PROCEDURE — 94640 AIRWAY INHALATION TREATMENT: CPT

## 2025-06-09 PROCEDURE — 97166 OT EVAL MOD COMPLEX 45 MIN: CPT

## 2025-06-09 PROCEDURE — 1200000000 HC SEMI PRIVATE

## 2025-06-09 PROCEDURE — 2580000003 HC RX 258: Performed by: NURSE PRACTITIONER

## 2025-06-09 PROCEDURE — 80048 BASIC METABOLIC PNL TOTAL CA: CPT

## 2025-06-09 RX ORDER — SODIUM CHLORIDE, SODIUM LACTATE, POTASSIUM CHLORIDE, CALCIUM CHLORIDE 600; 310; 30; 20 MG/100ML; MG/100ML; MG/100ML; MG/100ML
INJECTION, SOLUTION INTRAVENOUS CONTINUOUS
Status: DISPENSED | OUTPATIENT
Start: 2025-06-09 | End: 2025-06-10

## 2025-06-09 RX ADMIN — ACETYLCYSTEINE 400 MG: 100 SOLUTION ORAL; RESPIRATORY (INHALATION) at 06:19

## 2025-06-09 RX ADMIN — SODIUM CHLORIDE, SODIUM LACTATE, POTASSIUM CHLORIDE, AND CALCIUM CHLORIDE: .6; .31; .03; .02 INJECTION, SOLUTION INTRAVENOUS at 18:28

## 2025-06-09 RX ADMIN — IPRATROPIUM BROMIDE AND ALBUTEROL SULFATE 1 DOSE: .5; 2.5 SOLUTION RESPIRATORY (INHALATION) at 22:01

## 2025-06-09 RX ADMIN — METOPROLOL TARTRATE 25 MG: 25 TABLET, FILM COATED ORAL at 20:23

## 2025-06-09 RX ADMIN — ACETYLCYSTEINE 400 MG: 100 SOLUTION ORAL; RESPIRATORY (INHALATION) at 14:42

## 2025-06-09 RX ADMIN — PANTOPRAZOLE SODIUM 40 MG: 40 TABLET, DELAYED RELEASE ORAL at 05:49

## 2025-06-09 RX ADMIN — ACETAMINOPHEN 650 MG: 325 TABLET ORAL at 09:37

## 2025-06-09 RX ADMIN — IPRATROPIUM BROMIDE AND ALBUTEROL SULFATE 1 DOSE: .5; 2.5 SOLUTION RESPIRATORY (INHALATION) at 20:02

## 2025-06-09 RX ADMIN — ROSUVASTATIN CALCIUM 20 MG: 20 TABLET, FILM COATED ORAL at 20:23

## 2025-06-09 RX ADMIN — HEPARIN SODIUM 5000 UNITS: 5000 INJECTION INTRAVENOUS; SUBCUTANEOUS at 20:22

## 2025-06-09 RX ADMIN — IPRATROPIUM BROMIDE AND ALBUTEROL SULFATE 1 DOSE: .5; 2.5 SOLUTION RESPIRATORY (INHALATION) at 17:43

## 2025-06-09 RX ADMIN — AMLODIPINE BESYLATE 5 MG: 5 TABLET ORAL at 09:37

## 2025-06-09 RX ADMIN — SODIUM CHLORIDE, SODIUM LACTATE, POTASSIUM CHLORIDE, AND CALCIUM CHLORIDE: .6; .31; .03; .02 INJECTION, SOLUTION INTRAVENOUS at 05:49

## 2025-06-09 RX ADMIN — IPRATROPIUM BROMIDE AND ALBUTEROL SULFATE 1 DOSE: .5; 2.5 SOLUTION RESPIRATORY (INHALATION) at 11:29

## 2025-06-09 RX ADMIN — DIAZEPAM 2 MG: 2 TABLET ORAL at 23:50

## 2025-06-09 RX ADMIN — ROPINIROLE 1 MG: 1 TABLET, FILM COATED ORAL at 20:23

## 2025-06-09 RX ADMIN — CLOPIDOGREL BISULFATE 75 MG: 75 TABLET ORAL at 09:00

## 2025-06-09 RX ADMIN — PREDNISONE 40 MG: 20 TABLET ORAL at 18:16

## 2025-06-09 RX ADMIN — IPRATROPIUM BROMIDE AND ALBUTEROL SULFATE 1 DOSE: .5; 2.5 SOLUTION RESPIRATORY (INHALATION) at 06:19

## 2025-06-09 RX ADMIN — HEPARIN SODIUM 5000 UNITS: 5000 INJECTION INTRAVENOUS; SUBCUTANEOUS at 09:01

## 2025-06-09 RX ADMIN — DIAZEPAM 2 MG: 2 TABLET ORAL at 00:43

## 2025-06-09 RX ADMIN — IPRATROPIUM BROMIDE AND ALBUTEROL SULFATE 1 DOSE: .5; 2.5 SOLUTION RESPIRATORY (INHALATION) at 14:42

## 2025-06-09 RX ADMIN — GABAPENTIN 600 MG: 600 TABLET, FILM COATED ORAL at 23:50

## 2025-06-09 RX ADMIN — IPRATROPIUM BROMIDE AND ALBUTEROL SULFATE 1 DOSE: .5; 2.5 SOLUTION RESPIRATORY (INHALATION) at 09:23

## 2025-06-09 RX ADMIN — METOPROLOL TARTRATE 25 MG: 25 TABLET, FILM COATED ORAL at 09:00

## 2025-06-09 RX ADMIN — ACETYLCYSTEINE 400 MG: 100 SOLUTION ORAL; RESPIRATORY (INHALATION) at 09:22

## 2025-06-09 RX ADMIN — ACETYLCYSTEINE 400 MG: 100 SOLUTION ORAL; RESPIRATORY (INHALATION) at 20:02

## 2025-06-09 RX ADMIN — DIAZEPAM 2 MG: 2 TABLET ORAL at 16:19

## 2025-06-09 ASSESSMENT — PAIN SCALES - GENERAL
PAINLEVEL_OUTOF10: 0
PAINLEVEL_OUTOF10: 0
PAINLEVEL_OUTOF10: 8

## 2025-06-09 ASSESSMENT — PAIN DESCRIPTION - LOCATION: LOCATION: HEAD

## 2025-06-09 ASSESSMENT — PAIN DESCRIPTION - ORIENTATION: ORIENTATION: ANTERIOR

## 2025-06-09 ASSESSMENT — PAIN DESCRIPTION - FREQUENCY: FREQUENCY: CONTINUOUS

## 2025-06-09 ASSESSMENT — PAIN DESCRIPTION - ONSET: ONSET: GRADUAL

## 2025-06-09 ASSESSMENT — PAIN DESCRIPTION - DESCRIPTORS: DESCRIPTORS: ACHING

## 2025-06-09 ASSESSMENT — PAIN - FUNCTIONAL ASSESSMENT: PAIN_FUNCTIONAL_ASSESSMENT: ACTIVITIES ARE NOT PREVENTED

## 2025-06-09 ASSESSMENT — PAIN DESCRIPTION - DIRECTION: RADIATING_TOWARDS: NO

## 2025-06-09 ASSESSMENT — PAIN DESCRIPTION - PAIN TYPE: TYPE: ACUTE PAIN

## 2025-06-09 NOTE — CARE COORDINATION
CM received a voicemail from Tiff with [facility] stating that they have met with the patient, the patient's son, and her son's girlfriend regarding hospice services and at this time the patient does not seem interested in hospice.  Tiff advised that the patient mentioned to her possibly going to a SNF for therapy.  CM will follow.      8 AM  CM met with the patient to discuss discharge planning.  CM notified the patient that she is medically stable for discharge so a discharge plan needs to be finalized today.  Patient confirmed that she is not interested in hospice care at this time.  Patient voiced that she would like to be placed in a SNF for skilled PT/OT.  Patient stated that she would prefer placement at [facility] as it is closest for her family to visit.  PT/OT evaluations remain pending at this time, patient's insurance does not require pre-certification.  CM will follow.     11:20 AM  CM spoke with Bryan at [facility].  Bryan advised that they are able to accept the patient clinically.  However, they currently have three available rooms but the HVAC system is down in all three rooms and they cannot provide a guarantee that situation will be repaired by tomorrow (6/10/25).  CM advised Bryan that this CM will follow-up with him tomorrow and if they cannot accept the patient we will need to pursue other options.  CM will follow.     2:50 PM  CM met with the patient to notify her that [facility] is also agreeable to accept her.  Patient would like to wait to see if [facility] can accept her tomorrow.  CM advised the patient that she will need to be discharged tomorrow (6/10/25) even if [facility] is still unable to accept her due to their HVAC issues, patient voiced understanding and advised that she will agree to [facility] if [facility] is unable to accept.  CM will follow.

## 2025-06-09 NOTE — PLAN OF CARE
Problem: Skin/Tissue Integrity  Goal: Skin integrity remains intact  Description: 1.  Monitor for areas of redness and/or skin breakdown2.  Assess vascular access sites hourly3.  Every 4-6 hours minimum:  Change oxygen saturation probe site4.  Every 4-6 hours:  If on nasal continuous positive airway pressure, respiratory therapy assess nares and determine need for appliance change or resting period  6/9/2025 1041 by Nazanin Tran RN  Outcome: Adequate for Discharge  6/8/2025 2314 by Rika Delatorre RN  Outcome: Progressing     Problem: Safety - Adult  Goal: Free from fall injury  6/9/2025 1041 by Nazanin Tran RN  Outcome: Adequate for Discharge  6/8/2025 2314 by Rika Delatorre RN  Outcome: Progressing     Problem: Nutrition Deficit:  Goal: Optimize nutritional status  6/9/2025 1041 by Nazanin Tran RN  Outcome: Not Progressing  6/8/2025 2314 by Rika Delatorre RN  Outcome: Progressing     Problem: Pain  Goal: Verbalizes/displays adequate comfort level or baseline comfort level  6/9/2025 1041 by Nazanin Tran RN  Outcome: Adequate for Discharge  6/8/2025 2314 by Rika Delatorre RN  Outcome: Progressing     Problem: Chronic Conditions and Co-morbidities  Goal: Patient's chronic conditions and co-morbidity symptoms are monitored and maintained or improved  6/9/2025 1041 by Nazanin Tran RN  Outcome: Not Progressing  6/8/2025 2314 by Rika Delatorre RN  Outcome: Progressing     Problem: Nutrition Deficit:  Goal: Optimize nutritional status  6/9/2025 1041 by Nazanin Tran RN  Outcome: Not Progressing  6/8/2025 2314 by Rika Delatorre RN  Outcome: Progressing     Problem: Chronic Conditions and Co-morbidities  Goal: Patient's chronic conditions and co-morbidity symptoms are monitored and maintained or improved  6/9/2025 1041 by Nazanin Tran RN  Outcome: Not Progressing  6/8/2025 2314 by Rika Delatorre RN  Outcome: Progressing

## 2025-06-09 NOTE — PLAN OF CARE
Problem: Skin/Tissue Integrity  Goal: Skin integrity remains intact  Description: 1.  Monitor for areas of redness and/or skin breakdown2.  Assess vascular access sites hourly3.  Every 4-6 hours minimum:  Change oxygen saturation probe site4.  Every 4-6 hours:  If on nasal continuous positive airway pressure, respiratory therapy assess nares and determine need for appliance change or resting period  6/8/2025 2314 by Rika Delatorre RN  Outcome: Progressing  6/8/2025 1714 by Nazanin Tran RN  Outcome: Adequate for Discharge     Problem: Safety - Adult  Goal: Free from fall injury  6/8/2025 2314 by Rika Delatorre RN  Outcome: Progressing  6/8/2025 1714 by Nazanin Tran RN  Outcome: Adequate for Discharge     Problem: Nutrition Deficit:  Goal: Optimize nutritional status  6/8/2025 2314 by Rika Delatorre RN  Outcome: Progressing  6/8/2025 1714 by Nazanin Tran RN  Outcome: Not Progressing     Problem: Pain  Goal: Verbalizes/displays adequate comfort level or baseline comfort level  6/8/2025 2314 by Rika Delatorre RN  Outcome: Progressing  6/8/2025 1714 by Nazanin Tran RN  Outcome: Adequate for Discharge     Problem: Chronic Conditions and Co-morbidities  Goal: Patient's chronic conditions and co-morbidity symptoms are monitored and maintained or improved  Outcome: Progressing     Problem: Nutrition Deficit:  Goal: Optimize nutritional status  6/8/2025 2314 by Rika Delatorre RN  Outcome: Progressing  6/8/2025 1714 by Nazanin Tran RN  Outcome: Not Progressing

## 2025-06-10 ENCOUNTER — PARAMEDICINE (OUTPATIENT)
Dept: OTHER | Age: 65
End: 2025-06-10

## 2025-06-10 VITALS
HEART RATE: 65 BPM | SYSTOLIC BLOOD PRESSURE: 125 MMHG | TEMPERATURE: 97.4 F | BODY MASS INDEX: 14.95 KG/M2 | WEIGHT: 69.3 LBS | OXYGEN SATURATION: 92 % | HEIGHT: 57 IN | DIASTOLIC BLOOD PRESSURE: 53 MMHG | RESPIRATION RATE: 17 BRPM

## 2025-06-10 LAB
ANION GAP SERPL CALCULATED.3IONS-SCNC: 10 MMOL/L (ref 9–17)
BUN SERPL-MCNC: 27 MG/DL (ref 7–20)
CALCIUM SERPL-MCNC: 8.3 MG/DL (ref 8.3–10.6)
CHLORIDE SERPL-SCNC: 99 MMOL/L (ref 99–110)
CO2 SERPL-SCNC: 32 MMOL/L (ref 21–32)
CREAT SERPL-MCNC: 1.1 MG/DL (ref 0.6–1.2)
EKG ATRIAL RATE: 65 BPM
EKG ATRIAL RATE: 71 BPM
EKG DIAGNOSIS: NORMAL
EKG DIAGNOSIS: NORMAL
EKG P AXIS: 91 DEGREES
EKG P AXIS: 92 DEGREES
EKG P-R INTERVAL: 106 MS
EKG P-R INTERVAL: 106 MS
EKG Q-T INTERVAL: 460 MS
EKG Q-T INTERVAL: 462 MS
EKG QRS DURATION: 66 MS
EKG QRS DURATION: 70 MS
EKG QTC CALCULATION (BAZETT): 478 MS
EKG QTC CALCULATION (BAZETT): 502 MS
EKG R AXIS: 67 DEGREES
EKG R AXIS: 69 DEGREES
EKG T AXIS: 88 DEGREES
EKG T AXIS: 93 DEGREES
EKG VENTRICULAR RATE: 65 BPM
EKG VENTRICULAR RATE: 71 BPM
ERYTHROCYTE [DISTWIDTH] IN BLOOD BY AUTOMATED COUNT: 13 % (ref 11.7–14.9)
GFR, ESTIMATED: 55 ML/MIN/1.73M2
GLUCOSE SERPL-MCNC: 156 MG/DL (ref 74–99)
HCT VFR BLD AUTO: 34.9 % (ref 37–47)
HGB BLD-MCNC: 10.9 G/DL (ref 12.5–16)
MAGNESIUM SERPL-MCNC: 2 MG/DL (ref 1.8–2.4)
MCH RBC QN AUTO: 30.4 PG (ref 27–31)
MCHC RBC AUTO-ENTMCNC: 31.2 G/DL (ref 32–36)
MCV RBC AUTO: 97.5 FL (ref 78–100)
PLATELET # BLD AUTO: 199 K/UL (ref 140–440)
PMV BLD AUTO: 10.8 FL (ref 7.5–11.1)
POTASSIUM SERPL-SCNC: 3.4 MMOL/L (ref 3.5–5.1)
RBC # BLD AUTO: 3.58 M/UL (ref 4.2–5.4)
SODIUM SERPL-SCNC: 140 MMOL/L (ref 136–145)
WBC OTHER # BLD: 6.1 K/UL (ref 4–10.5)

## 2025-06-10 PROCEDURE — 85027 COMPLETE CBC AUTOMATED: CPT

## 2025-06-10 PROCEDURE — 93005 ELECTROCARDIOGRAM TRACING: CPT | Performed by: NURSE PRACTITIONER

## 2025-06-10 PROCEDURE — 6370000000 HC RX 637 (ALT 250 FOR IP): Performed by: NURSE PRACTITIONER

## 2025-06-10 PROCEDURE — 80048 BASIC METABOLIC PNL TOTAL CA: CPT

## 2025-06-10 PROCEDURE — 94761 N-INVAS EAR/PLS OXIMETRY MLT: CPT

## 2025-06-10 PROCEDURE — 83735 ASSAY OF MAGNESIUM: CPT

## 2025-06-10 PROCEDURE — 36415 COLL VENOUS BLD VENIPUNCTURE: CPT

## 2025-06-10 PROCEDURE — 2700000000 HC OXYGEN THERAPY PER DAY

## 2025-06-10 PROCEDURE — 94640 AIRWAY INHALATION TREATMENT: CPT

## 2025-06-10 PROCEDURE — 93010 ELECTROCARDIOGRAM REPORT: CPT | Performed by: INTERNAL MEDICINE

## 2025-06-10 PROCEDURE — 2580000003 HC RX 258: Performed by: NURSE PRACTITIONER

## 2025-06-10 RX ORDER — GABAPENTIN 600 MG/1
600 TABLET ORAL DAILY
Qty: 3 TABLET | Refills: 0 | Status: SHIPPED | OUTPATIENT
Start: 2025-06-10 | End: 2025-06-13

## 2025-06-10 RX ORDER — POTASSIUM CHLORIDE 1500 MG/1
20 TABLET, EXTENDED RELEASE ORAL ONCE
Status: DISCONTINUED | OUTPATIENT
Start: 2025-06-10 | End: 2025-06-10 | Stop reason: HOSPADM

## 2025-06-10 RX ORDER — ROSUVASTATIN CALCIUM 10 MG/1
10 TABLET, COATED ORAL
Status: DISCONTINUED | OUTPATIENT
Start: 2025-06-10 | End: 2025-06-10 | Stop reason: HOSPADM

## 2025-06-10 RX ORDER — ACETYLCYSTEINE 100 MG/ML
4 SOLUTION ORAL; RESPIRATORY (INHALATION) EVERY 4 HOURS PRN
Qty: 1 EACH | Refills: 0 | Status: SHIPPED | OUTPATIENT
Start: 2025-06-10 | End: 2025-06-17

## 2025-06-10 RX ORDER — IPRATROPIUM BROMIDE AND ALBUTEROL SULFATE 2.5; .5 MG/3ML; MG/3ML
3 SOLUTION RESPIRATORY (INHALATION) EVERY 4 HOURS PRN
Qty: 360 ML | Refills: 0
Start: 2025-06-10

## 2025-06-10 RX ORDER — DIAZEPAM 2 MG/1
2 TABLET ORAL EVERY 12 HOURS PRN
Qty: 4 TABLET | Refills: 0 | Status: SHIPPED | OUTPATIENT
Start: 2025-06-10 | End: 2025-06-20

## 2025-06-10 RX ADMIN — IPRATROPIUM BROMIDE AND ALBUTEROL SULFATE 1 DOSE: .5; 2.5 SOLUTION RESPIRATORY (INHALATION) at 15:17

## 2025-06-10 RX ADMIN — ACETYLCYSTEINE 400 MG: 100 SOLUTION ORAL; RESPIRATORY (INHALATION) at 08:50

## 2025-06-10 RX ADMIN — METOPROLOL TARTRATE 25 MG: 25 TABLET, FILM COATED ORAL at 09:22

## 2025-06-10 RX ADMIN — DIAZEPAM 2 MG: 2 TABLET ORAL at 09:21

## 2025-06-10 RX ADMIN — CLOPIDOGREL BISULFATE 75 MG: 75 TABLET ORAL at 09:21

## 2025-06-10 RX ADMIN — PANTOPRAZOLE SODIUM 40 MG: 40 TABLET, DELAYED RELEASE ORAL at 05:34

## 2025-06-10 RX ADMIN — SODIUM CHLORIDE, SODIUM LACTATE, POTASSIUM CHLORIDE, AND CALCIUM CHLORIDE: .6; .31; .03; .02 INJECTION, SOLUTION INTRAVENOUS at 07:39

## 2025-06-10 RX ADMIN — IPRATROPIUM BROMIDE AND ALBUTEROL SULFATE 1 DOSE: .5; 2.5 SOLUTION RESPIRATORY (INHALATION) at 08:50

## 2025-06-10 RX ADMIN — DIAZEPAM 2 MG: 2 TABLET ORAL at 15:52

## 2025-06-10 RX ADMIN — AMLODIPINE BESYLATE 5 MG: 5 TABLET ORAL at 09:21

## 2025-06-10 RX ADMIN — IPRATROPIUM BROMIDE AND ALBUTEROL SULFATE 1 DOSE: .5; 2.5 SOLUTION RESPIRATORY (INHALATION) at 00:24

## 2025-06-10 RX ADMIN — IPRATROPIUM BROMIDE AND ALBUTEROL SULFATE 1 DOSE: .5; 2.5 SOLUTION RESPIRATORY (INHALATION) at 05:50

## 2025-06-10 RX ADMIN — ACETAMINOPHEN 650 MG: 325 TABLET ORAL at 05:34

## 2025-06-10 ASSESSMENT — PAIN DESCRIPTION - LOCATION: LOCATION: HEAD

## 2025-06-10 ASSESSMENT — PAIN SCALES - GENERAL
PAINLEVEL_OUTOF10: 0
PAINLEVEL_OUTOF10: 0
PAINLEVEL_OUTOF10: 3

## 2025-06-10 ASSESSMENT — PAIN DESCRIPTION - PAIN TYPE: TYPE: ACUTE PAIN

## 2025-06-10 ASSESSMENT — PAIN - FUNCTIONAL ASSESSMENT: PAIN_FUNCTIONAL_ASSESSMENT: ACTIVITIES ARE NOT PREVENTED

## 2025-06-10 ASSESSMENT — PAIN DESCRIPTION - FREQUENCY: FREQUENCY: INTERMITTENT

## 2025-06-10 ASSESSMENT — PAIN DESCRIPTION - ONSET: ONSET: AWAKENED FROM SLEEP

## 2025-06-10 ASSESSMENT — PAIN DESCRIPTION - DESCRIPTORS: DESCRIPTORS: ACHING

## 2025-06-10 ASSESSMENT — PAIN DESCRIPTION - ORIENTATION: ORIENTATION: ANTERIOR

## 2025-06-10 NOTE — CARE COORDINATION
NURSING: The patient will be discharged to [facility] today.  Please complete the ESTUARDO form and call report to [phone].  Medical transportation has been arranged at 4 PM.   has already notified the patient's son Antonio of the discharge and time of transportation.

## 2025-06-10 NOTE — DISCHARGE INSTR - COC
I25.119    Severe malnutrition E43    COPD exacerbation ([facility]) J44.1    NSTEMI (non-ST elevated myocardial infarction) ([facility]) I21.4    Acute hypoxemic respiratory failure ([facility]) J96.01    COPD with acute exacerbation ([facility]) J44.1    Dysphagia causing pulmonary aspiration with swallowing R13.19    Dyslipidemia E78.5    Acute exacerbation of chronic obstructive pulmonary disease (COPD) ([facility]) J44.1       Isolation/Infection:   Isolation            Contact          Patient Infection Status        Infection Onset Added Last Indicated Last Indicated By Review Planned Expiration    Human Metapneumovirus 25 Resp Viral Panel 06/12/25 06/15/25 history               Resolved       Infection Onset Added Last Indicated Last Indicated By Resolved Resolved By    Influenza 24 Respiratory Panel, Molecular, with COVID-19 (Restricted: peds pts or suitable admitted adults) 24 Infection                          Nurse Assessment:  Last Vital Signs: BP (!) 125/53   Pulse 65   Temp 97.4 °F (36.3 °C) (Oral)   Resp 17   Ht 1.448 m (4' 9\")   Wt 31.4 kg (69 lb 4.8 oz)   SpO2 91%   BMI 15.00 kg/m²     Last documented pain score (0-10 scale): Pain Level: 0  Last Weight:   Wt Readings from Last 1 Encounters:   25 31.4 kg (69 lb 4.8 oz)     Mental Status:  oriented    IV Access:  - None    Nursing Mobility/ADLs:  Walking   Assisted  Transfer  Assisted  Bathing  Assisted  Dressing  Assisted  Toileting  Assisted  Feeding  Assisted  Med Admin  Assisted  Med Delivery   whole    Wound Care Documentation and Therapy:        Elimination:  Continence:   Bowel: Yes  Bladder: Yes  Urinary Catheter: None   Colostomy/Ileostomy/Ileal Conduit: No       Date of Last BM: 2025    Intake/Output Summary (Last 24 hours) at 6/10/2025 0950  Last data filed at 6/10/2025 0527  Gross per 24 hour   Intake --   Output 1300 ml   Net -1300 ml     I/O last 3 completed shifts:  In: 811.7

## 2025-06-10 NOTE — PROGRESS NOTES
Patient will be discharged to rehab at [facility]. Will leave the file open for a while.

## 2025-06-10 NOTE — DISCHARGE SUMMARY
Discharge Summary    Name:  Kate Minor /Age/Sex: 1960  (64 y.o. female)   MRN & CSN:  [id number] & [id number] Admission Date/Time: 2025  5:06 AM   Attending:  Pankaj Bowling MD Discharging Physician: Júnior Wylie, APRN - [facility] Course:   Kate Minor is a 64 y.o.  female  who presents with Acute exacerbation of chronic obstructive pulmonary disease (COPD) (HCC)    Acute on Chronic respiratory failure, improved  Acute COPD exacerbation, improved  - presented in respiratory distress, acutely distressed requiring increased supplemental oxygen during her exercise.  - Hx of severe emphysema   - Refused BiPAP on admission-DNRCC  - CTA centrilobular emphysema, nonacute for PE   - Back to baseline supplemental oxygen requirement of 3 L/min nasal cannula  -Continue home bronchodilators/ICS  - PRN symptom control for RON r/t dyspnea   - Completed prednisone course  -Doxycycline course completed   -Dispo: Medically stable to transition to SNF  -  Nonobstructive CAD  - Hx of NSTEMI 3/2024  -LHC 2024.  Diffuse calcified tortuous vessels 40-50% stenosis Ramus/circumflex.  No intervention recommended continue medical management at that time.  -Continue secondary prevention APT/statin     Hypertension   - continue Norvasc/Lopressor   -Trends appear controlled     Hyperlipidemia  -Continue statin     Anemia, chronic  - H/H 10.9/34.9  - Appears overall stable on downward trend since admission.  Baseline appears to be 10-11  -Repeat CBC x 1 week     Sever protein caloric malnutrition  - RD assessment as documented below   - continue supplements       The patient expressed appropriate understanding of and agreement with the discharge recommendations, medications, and plan.     Consults this admission:  IP CONSULT TO DIETITIAN  IP CONSULT TO HOSPICE            The following was documented by the Dietitian:    Malnutrition Assessment  Context of Malnutrition: Chronic Illness (25

## 2025-06-10 NOTE — PROGRESS NOTES
4 Eyes Skin Assessment     NAME:  Kate Minor  YOB: 1960  MEDICAL RECORD NUMBER:  [id number]    The patient is being assessed for  Admission    I agree that at least one RN has performed a thorough Head to Toe Skin Assessment on the patient. ALL assessment sites listed below have been assessed.      Areas assessed by both nurses:    Head, Face, Ears, Shoulders, Back, Chest, Arms, Elbows, Hands, Sacrum. Buttock, Coccyx, Ischium, Legs. Feet and Heels, and Under Medical Devices         Does the Patient have a Wound? No noted wound(s)       Navid Prevention initiated by RN: YES  Wound Care Orders initiated by RN: No    Pressure Injury (Stage 3,4, Unstageable, DTI, NWPT, and Complex wounds) if present, place Wound referral order by RN under : No    New Ostomies, if present place, Ostomy referral order under : No     Nurse 1 eSignature: Electronically signed by Kemi Fowler RN on 6/5/25 at 12:32 PM EDT    **SHARE this note so that the co-signing nurse can place an eSignature**    Nurse 2 eSignature: Electronically signed by Amalia Zamora on 6/5/25 at 12:32 PM EDT\    
Advance Care Planning    6 minutes were spent discussing the patient's resuscitation status, advance care planning, and end of life care with the patient and/or family/surrogate.    The patient and/or family/surrogate voluntarily agreed to participate in ACP services.    Patient's cognitive capacity: Alert oriented    I answered all the patient/family questions that I could within the range and scope of the current medical situation.  We discussed the medical conditions, risks, benefits, outcomes, and goals of care at this time for the patient's medical issues at hand in the face of the patient's chronic issues and current presentation.    Summary of Discussion: *Patient would like to be DNR CC and have a hospice consult, she does not want to be intubated and she does not want to wear a BiPAP.  Condition that instigated the ACP on this DOS  (relevant PHM, functional status, goals of care, and whom this was discussed with including the names and relationship to the patient, and any relevant advance care documentation discussion) acute respiratory failure    This was a face-to-face encounter and was performed for the diagnosis of acute respiratory failure    Outcome: DNR CC with a hospice consult      DO NOT do CPR/DO NOT INTUBATE intubation    
Assumed care of pt at this time.  
Called [facility] and LM with staff member asking for call back to give report on pt.   
Facility/Department: [facility]  Physical Therapy Initial Assessment    Name Kate Minor    1960   MRN [id number]   Date of Service 2025   Patient Room  015-01     Patient Diagnoses The primary encounter diagnosis was COPD exacerbation (HCC). Diagnoses of Hypoxemia and Hypokalemia were also pertinent to this visit.   Past Medical History  has a past medical history of Salas's esophagus, Elevated troponin, Emphysema of lung (HCC), H/O Doppler ultrasound carotid, H/O echocardiogram, H/O exercise stress test, History of Holter monitoring, History of nuclear stress test, HLD (hyperlipidemia), Hypertension, NSTEMI (non-ST elevated myocardial infarction) (HCC), Pulmonary emphysema (HCC), and TIA (transient ischemic attack).   Past Surgical History  has a past surgical history that includes Appendectomy; Colonoscopy; Colonoscopy (N/A, 2023); Upper gastrointestinal endoscopy (N/A, 2023); Cardiac procedure (N/A, 2024); and Upper gastrointestinal endoscopy (N/A, 3/30/2024).       Discharge Recommendations  Skilled Nursing Facility - pt has low tolerance to activity, will likely need LTC or 24 hour assist at home following skilled therapy.   Equipment: Defer to next level of care    Assessment  Conditions Requiring Skilled Therapeutic Intervention: Decreased functional mobility, Decreased strength, Decreased endurance, Decreased ADL status, Decreased high level ADLs/IADLs, Impaired safety awareness, Increased pain, and Decreased posture  Assessment of Evaluation: Patient is a 64 y.o. female who presents to [facility] with COPD exacerbation. Patient would benefit from continued skilled physical therapy services to address decreased strength, endurance, impaired balance, and decline in functional mobility.  Treatment Diagnosis: Muscle weakness (generalized), Decreased endurance, Difficulty walking, and History of falls, at risk for falls  Therapy Prognosis: Poor  Decision Making: Medium 
Faxed and called [facility] referral, spoke with Dalia, they will be out to see patient around 4-4:30.   
Patient now Hospice care/DNR-CC and continues to refuse BIPAP.  RT will follow as patient tolerates.  Patient is agreeable to MN txs as needed.  EN[facility].  
Patient refuses BIPAP.    Alana Mckinney  
Patient stated she did not feel she was ready to go home. This nurse informed patient that there were not yet any discharge orders but asked why patient did not feel ready. Patient stated, \"If I have to go home today I am going to panic and go right back into the ER.\" This nurse informed patient that she would let dr know her concerns.   
Pt with increased SOB with sats 84% after ambulating to BR. Mamta NP, notified, order for PRN every 2 hour duonebs placed.  
The patient's daily home medications were updated in the chart's Home Medication List after being reviewed with:   [x] the patient.  [] the patient's facility MAR.  [] the patient's family or caregiver.  [] the patient's pharmacy.    Patient verbally went over home meds as well as brought in her prescription bottles. Patient voiced understanding that home medications would be locked up till family visits or patient discharged.   
  Surrogate Decision Maker/ POLUIS Gifford     Personally reviewed Lab Studies and Imaging     Discussed management of the case with Dr. Feliciano my supervising physician who is in agreement with the above-noted plan of care.         Drugs that require monitoring for toxicity include heparin and the method of monitoring was CBC for anemia        Subjective:     Chief Complaint:     Patient seen and examined this morning she does not want to discharge home she is too anxious and states \" I will come right back to the emergency department.\"    Will discuss in AM.  Review of Systems:      Pertinent positives and negatives discussed in HPI    Objective:     Intake/Output Summary (Last 24 hours) at 6/7/2025 0837  Last data filed at 6/6/2025 2153  Gross per 24 hour   Intake 370 ml   Output --   Net 370 ml      Vitals:   Vitals:    06/06/25 2042 06/07/25 0157 06/07/25 0308 06/07/25 0708   BP:  (!) 152/60     Pulse:  84     Resp:  18     Temp:  98 °F (36.7 °C)     TempSrc:  Oral     SpO2: 93% 92% 93% 99%   Weight:  31.1 kg (68 lb 8 oz)     Height:             Physical Exam:      General: NAD  Eyes: EOMI  ENT: neck supple  Cardiovascular: Regular rate and rhythm  Respiratory: Scattered wheezing and rhonchi diminished, some work of breathing  Gastrointestinal: Soft, non tender nondistended bowel sounds present in all 4 quadrants  Genitourinary: no suprapubic tenderness  Musculoskeletal: No edema  Skin: warm, dry  Neuro: Alert.  Psych: Mood appropriate.         Medications:   Medications:    heparin (porcine)  5,000 Units SubCUTAneous BID    nicotine  1 patch TransDERmal Daily    sodium chloride flush  5-40 mL IntraVENous 2 times per day    ipratropium 0.5 mg-albuterol 2.5 mg  1 Dose Inhalation Q4H WA RT    predniSONE  40 mg Oral Dinner    doxycycline  100 mg Oral 2 times per day    [Held by provider] amLODIPine  5 mg Oral Daily    clopidogrel  75 mg Oral Daily    gabapentin  600 mg Oral Daily    metoprolol tartrate  25 mg 
QAM AC    rOPINIRole  1 mg Oral Nightly    rosuvastatin  20 mg Oral QHS      Infusions:    lactated ringers      sodium chloride       PRN Meds: calcium carbonate, 1,000 mg, TID PRN  diazePAM, 2 mg, Q8H PRN  ondansetron, 4 mg, Q6H PRN  ipratropium 0.5 mg-albuterol 2.5 mg, 1 Dose, Q2H PRN  sodium chloride flush, 5-40 mL, PRN  sodium chloride, 250 mL, PRN  polyethylene glycol, 17 g, Daily PRN  acetaminophen, 650 mg, Q6H PRN   Or  acetaminophen, 650 mg, Q6H PRN        Labs and Imaging   CTA PULMONARY W CONTRAST  Result Date: 6/5/2025  EXAMINATION: CTA PULMONARY W CONTRAST DATE OF EXAM:  6/5/2025 7:09 DEMOGRAPHICS: 64 years old Female INDICATION: SOB Contrast utilized and relevant clinical information: Images from the original note were not included. Severe sob increase with laying down, pt sts feels like an elephant is on her chest. 85ml isovue 370 @ lt wrist @ 0725hrs, gfr 51, creat 1.2 6-5-2025, Severe malnutrition Hypertension Barretts esophagus Emphysema/COPD (HCC) Tobacco use Chest pain Chronic obstructive pulmonary disease with acute exacerbation (HCC) Coronary artery disease involving  COMPARISON: No existing relevant imaging study corresponding to the same anatomical region is available. TECHNIQUE: The study was performed with the rapid administration of intravenous contrast timed to best evaluate and opacify the pulmonary arteries and their branches. Axial source 3 mm images were obtained. 3-D reconstructions were performed. Imaging and processing were performed per the institutional PE evaluation protocol.   DOSE OPTIMIZATION: CT radiation dose optimization techniques (automated exposure  control, and use of iterative reconstruction techniques, or adjustment of the mA and/or kV according to patient size) were used to limit patient radiation dose. FINDINGS: CT CHEST: Thyroid: The thyroid is unremarkable. Systemic arterial Vasculature: Calcification of the aortic arch and origin of the great vessels.  Pulmonary 
A A Lot   Mod A A Little  Min A A Little   CGA  SBA None   Mod I  Indep  Sup   1.  Putting on and taking off regular lower body clothing? [] 1    [x] 2   [] 2   [] 3   [] 3   [] 4      2. Bathing (including washing, rinsing, drying)? [] 1   [x] 2   [] 2 [] 3 [] 3 [] 4   3. Toileting, which includes using toilet, bedpan, or urinal? [] 1    [x] 2   [] 2   [] 3   [] 3   [] 4     4. Putting on and taking off regular upper body clothing? [] 1   [] 2   [x] 2   [] 3   [] 3    [] 4      5. Taking care of personal grooming such as brushing teeth? [] 1   [] 2    [x] 2 [] 3    [] 3   [] 4      6. Eating meals?   [] 1   [] 2   [] 2   [] 3   [x] 3   [] 4      Raw Score:  13     [24=0% impaired(CH), 23=1-19%(CI), 20-22=20-39%(CJ), 15-19=40-59%(CK), 10-14=60-79%(CL), 7-9=80-99%(CM), 6=100%(CN)]         Goals  Patient Goals:  To feel better  Time frame: 1 week or until discharge  Patient will complete functional transfers with mod I using AE PRN and with good safety awareness.  Patient will complete all toilet tasks with mod I  Patient will complete UB ADLs with mod I  Patient will complete LB ADLs with mod I using AE PRN  Pt will complete 15+ min of BUE therex to increase strength/endurance for ADLs/functional mobility    Education  Given to: Patient  Education provided: Role of therapy, Plan of care, Precautions, and Energy conservation   Education method: Verbal      Therapy Time   Individual Co-Eval   Time In  1100   Time Out  1130   Total Time  30       Signed: TC Daly/L [id number],   6/9/2025, 12:04 PM   
            Electronically signed by KJ MENON NP on 6/6/2025 at 2:47 PM   
NEGATIVE 07/09/2024 06:00 PM    JULIO CESAR NEGATIVE 07/09/2024 06:00 PM             Electronically signed by KJ MENON NP on 6/8/2025 at 8:58 AM

## 2025-06-10 NOTE — CARE COORDINATION
CM spoke with Bryan at [facility] who advised that the inside temperature in the room they have available for the patient is stable but if the patient feels it is too warm they will accommodate her by setting up a portable air conditioning unit until their HVAC system is fully operational.  CM will follow.      8:45 AM  CM met with the patient during IDR and advised her that [facility] is able to accept her today and has the temperature stabilized in the room they have for her.  CM also notified the patient that they will accommodate her if she feels the room is too warm and can add a portable air conditioner, patient voiced understanding and is agreeable to placement at [facility].  Patient again sated that her goal is to gain strength so she can return home.  CM will follow.      9:49 AM  PASRR completed online.     10:09 AM  Medical transportation to [facility] has been arranged at 4 PM through [place].  CM will follow.     10:11 AM  CM spoke with the patient's son Antonio via telephone to notify him of the patient's discharge and time of transportation, Antonio voiced understanding.  CM also spoke with Bryan at [facility], the patient's nurse Elizabet RN, and Júnior CARLOS to notify them of the time of transportation.  CM available if needs arise.

## 2025-06-10 NOTE — PLAN OF CARE
Problem: Skin/Tissue Integrity  Goal: Skin integrity remains intact  Description: 1.  Monitor for areas of redness and/or skin breakdown2.  Assess vascular access sites hourly3.  Every 4-6 hours minimum:  Change oxygen saturation probe site4.  Every 4-6 hours:  If on nasal continuous positive airway pressure, respiratory therapy assess nares and determine need for appliance change or resting period  Outcome: Progressing  Flowsheets (Taken 6/9/2025 2020)  Skin Integrity Remains Intact: Monitor for areas of redness and/or skin breakdown     Problem: Safety - Adult  Goal: Free from fall injury  Outcome: Progressing     Problem: Nutrition Deficit:  Goal: Optimize nutritional status  Outcome: Progressing     Problem: Pain  Goal: Verbalizes/displays adequate comfort level or baseline comfort level  Outcome: Progressing     Problem: Chronic Conditions and Co-morbidities  Goal: Patient's chronic conditions and co-morbidity symptoms are monitored and maintained or improved  Outcome: Progressing

## 2025-06-26 ENCOUNTER — PARAMEDICINE (OUTPATIENT)
Dept: OTHER | Age: 65
End: 2025-06-26

## 2025-06-26 NOTE — PROGRESS NOTES
Unknown if patient is still in rehab. At this time will remove from the CP Program. Should another referral be made another file will be opened.

## (undated) DEVICE — FORCEPS BX L240CM JAW DIA2.8MM L CAP W/ NDL MIC MESH TOOTH

## (undated) DEVICE — GLIDESHEATH SLENDER ACCESS KIT: Brand: GLIDESHEATH SLENDER

## (undated) DEVICE — RADIFOCUS OPTITORQUE ANGIOGRAPHIC CATHETER: Brand: OPTITORQUE

## (undated) DEVICE — SNARE VASC L240CM LOOP W10MM SHTH DIA2.4MM RND STIFF CLD

## (undated) DEVICE — ENDOSCOPY KIT: Brand: MEDLINE INDUSTRIES, INC.

## (undated) DEVICE — BAND COMPR L24CM REG CLR PLAS HEMSTAT EXT HK AND LOOP RETEN

## (undated) DEVICE — ANGIOGRAPHY KIT CUST MANIFOLD

## (undated) DEVICE — GUIDEWIRE VASC L260CM DIA0.035IN RAD 3MM J TIP L7CM PTFE

## (undated) DEVICE — Device